# Patient Record
Sex: MALE | Employment: FULL TIME | URBAN - METROPOLITAN AREA
[De-identification: names, ages, dates, MRNs, and addresses within clinical notes are randomized per-mention and may not be internally consistent; named-entity substitution may affect disease eponyms.]

---

## 2017-04-17 DIAGNOSIS — I10 ESSENTIAL HYPERTENSION WITH GOAL BLOOD PRESSURE LESS THAN 140/90: ICD-10-CM

## 2017-04-17 NOTE — TELEPHONE ENCOUNTER
lisinopril (PRINIVIL/ZESTRIL) 20 MG tablet      Last Written Prescription Date: 12/12/16  Last Fill Quantity: 90, # refills: 0  Last Office Visit with G, [facility] or [facility] prescribing provider: 09/22/16       Potassium   Date Value Ref Range Status   07/01/2016 4.0 3.4 - 5.3 mmol/L Final     Creatinine   Date Value Ref Range Status   07/01/2016 1.12 0.66 - 1.25 mg/dL Final     BP Readings from Last 3 Encounters:   09/22/16 110/60   07/11/16 110/74   06/23/15 110/72         Priscilla Gonzales  [facility]

## 2017-04-18 RX ORDER — LISINOPRIL 20 MG/1
TABLET ORAL
Qty: 90 TABLET | Refills: 0 | Status: SHIPPED | OUTPATIENT
Start: 2017-04-18 | End: 2017-05-18

## 2017-04-18 NOTE — TELEPHONE ENCOUNTER
Prescription approved per [facility].  Esme Mcnamara RN

## 2017-05-18 ENCOUNTER — OFFICE VISIT (OUTPATIENT)
Dept: FAMILY MEDICINE | Facility: CLINIC | Age: 57
End: 2017-05-18
Payer: COMMERCIAL

## 2017-05-18 ENCOUNTER — RADIANT APPOINTMENT (OUTPATIENT)
Dept: GENERAL RADIOLOGY | Facility: CLINIC | Age: 57
End: 2017-05-18
Attending: INTERNAL MEDICINE
Payer: COMMERCIAL

## 2017-05-18 VITALS
HEIGHT: 70 IN | SYSTOLIC BLOOD PRESSURE: 128 MMHG | OXYGEN SATURATION: 95 % | DIASTOLIC BLOOD PRESSURE: 82 MMHG | HEART RATE: 96 BPM | BODY MASS INDEX: 27.35 KG/M2 | WEIGHT: 191 LBS | TEMPERATURE: 98.5 F

## 2017-05-18 DIAGNOSIS — J34.89 SINUS PRESSURE: ICD-10-CM

## 2017-05-18 DIAGNOSIS — R09.81 NASAL CONGESTION: ICD-10-CM

## 2017-05-18 DIAGNOSIS — B00.1 COLD SORE: ICD-10-CM

## 2017-05-18 DIAGNOSIS — R68.89 FLU-LIKE SYMPTOMS: ICD-10-CM

## 2017-05-18 DIAGNOSIS — J30.89 SEASONAL ALLERGIC RHINITIS DUE TO OTHER ALLERGIC TRIGGER: Primary | ICD-10-CM

## 2017-05-18 DIAGNOSIS — I10 ESSENTIAL HYPERTENSION WITH GOAL BLOOD PRESSURE LESS THAN 140/90: ICD-10-CM

## 2017-05-18 DIAGNOSIS — H10.13 ALLERGIC CONJUNCTIVITIS, BILATERAL: ICD-10-CM

## 2017-05-18 LAB
BASOPHILS # BLD AUTO: 0 10E9/L (ref 0–0.2)
BASOPHILS NFR BLD AUTO: 0.3 %
DIFFERENTIAL METHOD BLD: NORMAL
EOSINOPHIL # BLD AUTO: 0.2 10E9/L (ref 0–0.7)
EOSINOPHIL NFR BLD AUTO: 3.4 %
ERYTHROCYTE [DISTWIDTH] IN BLOOD BY AUTOMATED COUNT: 13.9 % (ref 10–15)
HCT VFR BLD AUTO: 48.1 % (ref 40–53)
HGB BLD-MCNC: 17.1 G/DL (ref 13.3–17.7)
LYMPHOCYTES # BLD AUTO: 1.6 10E9/L (ref 0.8–5.3)
LYMPHOCYTES NFR BLD AUTO: 24.8 %
MCH RBC QN AUTO: 30.1 PG (ref 26.5–33)
MCHC RBC AUTO-ENTMCNC: 35.6 G/DL (ref 31.5–36.5)
MCV RBC AUTO: 85 FL (ref 78–100)
MONOCYTES # BLD AUTO: 0.8 10E9/L (ref 0–1.3)
MONOCYTES NFR BLD AUTO: 12.3 %
NEUTROPHILS # BLD AUTO: 3.8 10E9/L (ref 1.6–8.3)
NEUTROPHILS NFR BLD AUTO: 59.2 %
PLATELET # BLD AUTO: 176 10E9/L (ref 150–450)
RBC # BLD AUTO: 5.69 10E12/L (ref 4.4–5.9)
WBC # BLD AUTO: 6.4 10E9/L (ref 4–11)

## 2017-05-18 PROCEDURE — 36415 COLL VENOUS BLD VENIPUNCTURE: CPT | Performed by: INTERNAL MEDICINE

## 2017-05-18 PROCEDURE — 85025 COMPLETE CBC W/AUTO DIFF WBC: CPT | Performed by: INTERNAL MEDICINE

## 2017-05-18 PROCEDURE — 86710 INFLUENZA VIRUS ANTIBODY: CPT | Mod: 90 | Performed by: INTERNAL MEDICINE

## 2017-05-18 PROCEDURE — 70220 X-RAY EXAM OF SINUSES: CPT

## 2017-05-18 PROCEDURE — 99214 OFFICE O/P EST MOD 30 MIN: CPT | Performed by: INTERNAL MEDICINE

## 2017-05-18 PROCEDURE — 99000 SPECIMEN HANDLING OFFICE-LAB: CPT | Performed by: INTERNAL MEDICINE

## 2017-05-18 RX ORDER — VALACYCLOVIR HYDROCHLORIDE 1 G/1
2000 TABLET, FILM COATED ORAL 2 TIMES DAILY
Qty: 4 TABLET | Refills: 0 | Status: SHIPPED | OUTPATIENT
Start: 2017-05-18 | End: 2018-03-01

## 2017-05-18 RX ORDER — OLOPATADINE HYDROCHLORIDE 1 MG/ML
1 SOLUTION/ DROPS OPHTHALMIC 2 TIMES DAILY
Qty: 5 ML | Refills: 11 | Status: SHIPPED | OUTPATIENT
Start: 2017-05-18 | End: 2018-03-01

## 2017-05-18 RX ORDER — FLUTICASONE PROPIONATE 50 MCG
2 SPRAY, SUSPENSION (ML) NASAL EVERY MORNING
Qty: 1 BOTTLE | Refills: 11 | Status: SHIPPED | OUTPATIENT
Start: 2017-05-18 | End: 2018-03-01

## 2017-05-18 RX ORDER — MONTELUKAST SODIUM 10 MG/1
10 TABLET ORAL AT BEDTIME
Qty: 30 TABLET | Refills: 11 | Status: SHIPPED | OUTPATIENT
Start: 2017-05-18 | End: 2018-03-01

## 2017-05-18 RX ORDER — LISINOPRIL 20 MG/1
TABLET ORAL
Qty: 90 TABLET | Refills: 0 | Status: CANCELLED | OUTPATIENT
Start: 2017-05-18

## 2017-05-18 RX ORDER — LISINOPRIL 20 MG/1
20 TABLET ORAL DAILY
Qty: 90 TABLET | Refills: 1 | Status: SHIPPED | OUTPATIENT
Start: 2017-05-18 | End: 2018-02-12

## 2017-05-18 RX ORDER — MONTELUKAST SODIUM 10 MG/1
10 TABLET ORAL AT BEDTIME
Qty: 30 TABLET | Refills: 11 | Status: SHIPPED | OUTPATIENT
Start: 2017-05-18 | End: 2017-05-18

## 2017-05-18 NOTE — PATIENT INSTRUCTIONS
This summary includes the important diagnoses, test, medications and other important parts of your medical history.  Below are a few good we sites you can use to learn more about these.     Www.[employer].org : Up to date and easily searchable information on multiple topics.  Www.[employer]ier [employer].org/Pharmacy/c_539084.asp : [facility] $4.99 medications  Www.medlineplus.gov : medication info, interactive tutorials, watch real surgeries online  Www.familydoctor.org : good info from the Academy of Family Physicians  Www.mayoSword.cominic.com : good info from the [facility]  Www.cdc.gov : public health info, travel advisories, epidemics (H1N1)  Www.aap.org : children's health info, normal development, vaccinations  Www.health.[facility].[place].us : [place] dept of heat, public health issues in [place], N1N1    Based on your medical history and these are the current health maintenance or preventive care services that you are due for (some may have been done at this visit:)  Health Maintenance Due   Topic Date Due     EYE EXAM Q1 YEAR( NO INBASKET)  11/26/2015     =================================================================================  Normal Values   Blood pressure  <140/90 for most adults    <130/80 for some chronic diseases (ask your care team about yours)    BMI (body mass index)  18.5-25 kg/m2 (based on height and weight)     Thank you for visiting [facility]    Normal or non-critical lab and imaging results will be communicated to you by MyChart, letter or phone within 7 days.  If you do not hear from us within 10 days, please call the clinic. If you have a critical or abnormal lab result, we will notify you by phone as soon as possible.     If you have any questions regarding your visit please contact:     Team Comfort:   Clinic Hours Telephone Number   Dr. Amandeep García   7am-5pm  Monday - Friday [phone]  Jonas FERNANDEZ   Pharmacy  8am-8pm Monday-Thursday      8am-6pm Friday  9am-5pm Saturday-Sunday [phone]   Urgent Care 11am-8pm Monday-Friday        9am-5pm Saturday-Sunday [phone]     After hours, weekend or if you need to make an appointment with your primary provider please call [phone].   After Hours nurse advise: call [place] [facility]: [phone]    Medication Refills:  Call your pharmacy and they will forward the refill to us. Please allow 3 business days for your refills to be completed.    Use [employer]oc[employer] (secure email communication and access to your chart) to send your primary care provider a message or make an appointment. Ask someone on your Team how to sign up for [employer]. To log on to Boostable or for more information in [place] please visit the website at [email or web address].  As of October 8, 2013, all password changes, disabled accounts, or ID changes in Tocagen/MyHealth will be done by our Access Services Department.   If you need help with your account or password, call: [phone]. Clinic staff no longer has the ability to change passwords.

## 2017-05-18 NOTE — NURSING NOTE
"Chief Complaint   Patient presents with     Flu       Initial /83 (BP Location: Left arm, Patient Position: Chair, Cuff Size: Adult Regular)  Pulse 96  Temp 98.5  F (36.9  C) (Oral)  Ht 5' 10.25\" (1.784 m)  Wt 191 lb (86.6 kg)  SpO2 95%  BMI 27.21 kg/m2 Estimated body mass index is 27.21 kg/(m^2) as calculated from the following:    Height as of this encounter: 5' 10.25\" (1.784 m).    Weight as of this encounter: 191 lb (86.6 kg).  Medication Reconciliation: complete     Pa Aide Calero MA      "

## 2017-05-18 NOTE — PROGRESS NOTES
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  Additional history: as documented    Patient Active Problem List   Diagnosis     CARDIOVASCULAR SCREENING; LDL GOAL LESS THAN 160     Headaches, tension-type     Chronic eczema     Essential hypertension with goal blood pressure less than 140/90     Seasonal allergic rhinitis     Overweight (BMI 25.0-29.9)     Past Surgical History:   Procedure Laterality Date     VASECTOMY         Social History   Substance Use Topics     Smoking status: Never Smoker     Smokeless tobacco: Never Used     Alcohol use 0.6 oz/week     1 Standard drinks or equivalent per week      Comment: ocassional     Family History   Problem Relation Age of Onset     Hypertension Father      Eye Disorder Mother      Eye Disorder Other      mother's side of family has cataracts, and glaucoma?     Asthma No family hx of      C.A.D. No family hx of      DIABETES No family hx of      CEREBROVASCULAR DISEASE No family hx of      Breast Cancer No family hx of      Cancer - colorectal No family hx of      Prostate Cancer No family hx of          No Known Allergies  Recent Labs   Lab Test  07/01/16   0930  06/23/15   0828   05/24/12   1041   LDL  113*  91   --   111   HDL  49  47   --   48   TRIG  77  121   --   127   ALT   --   47   --   "  --    CR  1.12  1.12   < >  1.06   GFRESTIMATED  68  69   < >  74   GFRESTBLACK  83  83   < >  90   POTASSIUM  4.0  3.9   < >  4.3   TSH   --    --    --   2.46    < > = values in this interval not displayed.      BP Readings from Last 3 Encounters:   05/23/17 130/84   05/18/17 128/82   09/22/16 110/60    Wt Readings from Last 3 Encounters:   05/23/17 188 lb 9.6 oz (85.5 kg)   05/18/17 191 lb (86.6 kg)   09/22/16 193 lb 6.4 oz (87.7 kg)                      ROS:  C: NEGATIVE for fever, chills, change in weight  I: NEGATIVE for worrisome rashes, moles or lesions  E: NEGATIVE for vision changes or irritation  ENT/MOUTH: As above.  R: NEGATIVE for significant cough or SOB  CV: NEGATIVE for chest pain, palpitations or peripheral edema  GI: NEGATIVE for nausea, abdominal pain, heartburn, or change in bowel habits  : NEGATIVE for frequency, dysuria, or hematuria  M: NEGATIVE for significant arthralgias or myalgia  N: NEGATIVE for weakness, dizziness or paresthesias  E: NEGATIVE for temperature intolerance, skin/hair changes  H: NEGATIVE for bleeding problems  P: NEGATIVE for changes in mood or affect    OBJECTIVE:                                                    /82  Pulse 96  Temp 98.5  F (36.9  C) (Oral)  Ht 5' 10.25\" (1.784 m)  Wt 191 lb (86.6 kg)  SpO2 95%  BMI 27.21 kg/m2  Body mass index is 27.21 kg/(m^2).  GENERAL: healthy, alert and no distress  EYES: Eyes grossly normal to inspection, extraocular movements - intact, and PERRL  HENT: ear canals- normal; TMs- normal; Nose- normal; Mouth- no ulcers, no lesions  NECK: no tenderness, no adenopathy, no asymmetry, no masses, no stiffness; thyroid- normal to palpation  RESP: lungs clear to auscultation - no rales, no rhonchi, no wheezes  CV: regular rates and rhythm, normal S1 S2, no S3 or S4 and no murmur, no click or rub -  MS: extremities- no gross deformities noted, no edema  SKIN: no suspicious lesions, no rashes  NEURO: strength and tone- normal, " sensory exam- grossly normal, mentation- intact, speech- normal, reflexes- symmetric  PSYCH: Alert and oriented times 3; speech- coherent , normal rate and volume; able to articulate logical thoughts, able to abstract reason, no tangential thoughts, no hallucinations or delusions, affect- normal    Diagnostic test results:  Results for orders placed or performed in visit on 05/18/17   XR Sinus Complete G/E 3 Views    Narrative    XR SINUS COMPLETE G/E 3 VW 5/18/2017 1:35 PM    HISTORY: Sinus disease.    COMPARISON: None.      Impression    IMPRESSION: The bilateral paranasal sinuses are grossly clear.    JONO MERCADO MD   Influenza A Virus Antibody IgM   Result Value Ref Range    Influenza A Ab IgM 0.19    CBC with platelets and differential   Result Value Ref Range    WBC 6.4 4.0 - 11.0 10e9/L    RBC Count 5.69 4.4 - 5.9 10e12/L    Hemoglobin 17.1 13.3 - 17.7 g/dL    Hematocrit 48.1 40.0 - 53.0 %    MCV 85 78 - 100 fl    MCH 30.1 26.5 - 33.0 pg    MCHC 35.6 31.5 - 36.5 g/dL    RDW 13.9 10.0 - 15.0 %    Platelet Count 176 150 - 450 10e9/L    Diff Method Automated Method     % Neutrophils 59.2 %    % Lymphocytes 24.8 %    % Monocytes 12.3 %    % Eosinophils 3.4 %    % Basophils 0.3 %    Absolute Neutrophil 3.8 1.6 - 8.3 10e9/L    Absolute Lymphocytes 1.6 0.8 - 5.3 10e9/L    Absolute Monocytes 0.8 0.0 - 1.3 10e9/L    Absolute Eosinophils 0.2 0.0 - 0.7 10e9/L    Absolute Basophils 0.0 0.0 - 0.2 10e9/L        ASSESSMENT/PLAN:                                                        ICD-10-CM    1. Seasonal allergic rhinitis due to other allergic trigger J30.89 fluticasone (FLONASE) 50 MCG/ACT spray   2. Allergic conjunctivitis, bilateral H10.13 olopatadine (PATANOL) 0.1 % ophthalmic solution     montelukast (SINGULAIR) 10 MG tablet     DISCONTINUED: montelukast (SINGULAIR) 10 MG tablet   3. Essential hypertension with goal blood pressure less than 140/90 I10 lisinopril (PRINIVIL/ZESTRIL) 20 MG tablet   4. Nasal  congestion R09.81    5. Sinus pressure J34.89 XR Sinus Complete G/E 3 Views   6. Flu-like symptoms R68.89 Influenza A Virus Antibody IgM     CBC with platelets and differential   7. Cold sore B00.1 valACYclovir (VALTREX) 1000 mg tablet       Follow-up visit if condition worsens.    Berry Lazo MD  [facility]

## 2017-05-18 NOTE — MR AVS SNAPSHOT
After Visit Summary   5/18/2017    Rudyd Aaron    MRN: [id number]           Patient Information     Date Of Birth          1960        Visit Information        Provider Department      5/18/2017 1:00 PM Berry Lazo MD [facility]        Today's Diagnoses     Seasonal allergic rhinitis due to other allergic trigger    -  1    Allergic conjunctivitis, bilateral        Essential hypertension with goal blood pressure less than 140/90        Nasal congestion        Sinus pressure        Flu-like symptoms        Cold sore          Care Instructions    This summary includes the important diagnoses, test, medications and other important parts of your medical history.  Below are a few good we sites you can use to learn more about these.     Www.[employer] : Up to date and easily searchable information on multiple topics.  Www.[employer]/Pharmacy/c_539084.asp : [employer] [facility] $4.99 medications  Www.[employer].gov : medication info, interactive tutorials, watch real surgeries online  Www.familydoctor.org : good info from the Academy of Family Physicians  Www.[employer] : good info from the [facility]  Www.cdc.gov : public health info, travel advisories, epidemics (H1N1)  Www.aap.org : children's health info, normal development, vaccinations  Www.health.state.[place].us : [place] dept of heatlh, public health issues in [place], N1N1    Based on your medical history and these are the current health maintenance or preventive care services that you are due for (some may have been done at this visit:)  Health Maintenance Due   Topic Date Due     EYE EXAM Q1 YEAR( NO INBASKET)  11/26/2015     =================================================================================  Normal Values   Blood pressure  <140/90 for most adults    <130/80 for some chronic diseases (ask your care team about yours)    BMI (body mass index)  18.5-25 kg/m2 (based on height and weight)     Thank you for  visiting [facility]    Normal or non-critical lab and imaging results will be communicated to you by MyChart, letter or phone within 7 days.  If you do not hear from us within 10 days, please call the clinic. If you have a critical or abnormal lab result, we will notify you by phone as soon as possible.     If you have any questions regarding your visit please contact:     Team Comfort:   Clinic Hours Telephone Number   Dr. Amandeep Quinn   Polina García   7am-5pm  Monday - Friday [phone]  Jonas RN   Pharmacy 8am-8pm Monday-Thursday      8am-6pm Friday  9am-5pm Saturday-Sunday [phone]   Urgent Care 11am-8pm Monday-Friday        9am-5pm Saturday-Sunday [phone]     After hours, weekend or if you need to make an appointment with your primary provider please call [phone].   After Hours nurse advise: call [place] [facility]: [phone]    Medication Refills:  Call your pharmacy and they will forward the refill to us. Please allow 3 business days for your refills to be completed.    Use [employer] (secure email communication and access to your chart) to send your primary care provider a message or make an appointment. Ask someone on your Team how to sign up for [employer]. To log on to Howcast or for more information in [email or web address] please visit the website at [email or web address].  As of October 8, 2013, all password changes, disabled accounts, or ID changes in [employer]/MyHealth will be done by our Access Services Department.   If you need help with your account or password, call: [phone]. Clinic staff no longer has the ability to change passwords.           Follow-ups after your visit        Your next 10 appointments already scheduled     May 23, 2017  9:00 AM CDT   New Visit with Jarred Armenta MD   [facility] ([facility])    [address] [place] [address]  "[phone]              Who to contact     If you have questions or need follow up information about today's clinic visit or your schedule please contact [facility] PARRIS YANCI directly at [phone].  Normal or non-critical lab and imaging results will be communicated to you by Adkuhart, letter or phone within 4 business days after the clinic has received the results. If you do not hear from us within 7 days, please contact the clinic through Adkuhart or phone. If you have a critical or abnormal lab result, we will notify you by phone as soon as possible.  Submit refill requests through [employer] or call your pharmacy and they will forward the refill request to us. Please allow 3 business days for your refill to be completed.          Additional Information About Your Visit        [employer] Information     [employer] gives you secure access to your electronic health record. If you see a primary care provider, you can also send messages to your care team and make appointments. If you have questions, please call your primary care clinic.  If you do not have a primary care provider, please call [phone] and they will assist you.        Care EveryWhere ID     This is your Care EveryWhere ID. This could be used by other organizations to access your [place] medical records  [id number]        Your Vitals Were     Pulse Temperature Height Pulse Oximetry BMI (Body Mass Index)       96 98.5  F (36.9  C) (Oral) 5' 10.25\" (1.784 m) 95% 27.21 kg/m2        Blood Pressure from Last 3 Encounters:   05/18/17 128/82   09/22/16 110/60   07/11/16 110/74    Weight from Last 3 Encounters:   05/18/17 191 lb (86.6 kg)   09/22/16 193 lb 6.4 oz (87.7 kg)   07/11/16 190 lb (86.2 kg)              We Performed the Following     CBC with platelets and differential     Influenza A Virus Antibody IgM     XR Sinus Complete G/E 3 Views          Today's Medication Changes          These changes are accurate as of: 5/18/17  " 1:44 PM.  If you have any questions, ask your nurse or doctor.               Start taking these medicines.        Dose/Directions    fluticasone 50 MCG/ACT spray   Commonly known as:  FLONASE   Used for:  Seasonal allergic rhinitis due to other allergic trigger   Started by:  Berry Lazo MD        Dose:  2 spray   Spray 2 sprays into both nostrils every morning   Quantity:  1 Bottle   Refills:  11       montelukast 10 MG tablet   Commonly known as:  SINGULAIR   Used for:  Allergic conjunctivitis, bilateral   Started by:  Berry Lazo MD        Dose:  10 mg   Take 1 tablet (10 mg) by mouth At Bedtime   Quantity:  30 tablet   Refills:  11       olopatadine 0.1 % ophthalmic solution   Commonly known as:  PATANOL   Used for:  Allergic conjunctivitis, bilateral   Started by:  Berry Lazo MD        Dose:  1 drop   Place 1 drop into both eyes 2 times daily   Quantity:  5 mL   Refills:  11       valACYclovir 1000 mg tablet   Commonly known as:  VALTREX   Used for:  Cold sore   Started by:  Berry Lazo MD        Dose:  2000 mg   Take 2 tablets (2,000 mg) by mouth 2 times daily   Quantity:  4 tablet   Refills:  0         These medicines have changed or have updated prescriptions.        Dose/Directions    lisinopril 20 MG tablet   Commonly known as:  PRINIVIL/ZESTRIL   This may have changed:  See the new instructions.   Used for:  Essential hypertension with goal blood pressure less than 140/90   Changed by:  Berry Lazo MD        Dose:  20 mg   Take 1 tablet (20 mg) by mouth daily   Quantity:  90 tablet   Refills:  1            Where to get your medicines      These medications were sent to [facility] [place], [place] - [address]  [address], [facility] [address]     Phone:  [phone]     fluticasone 50 MCG/ACT spray    montelukast 10 MG tablet    olopatadine 0.1 % ophthalmic solution    valACYclovir 1000 mg tablet         These  medications were sent to [facility] [address] [place] [address] - [address] [facility] [address]  [address] [place] [address]     Phone:  [phone]     lisinopril 20 MG tablet                Primary Care Provider Office Phone # Fax #    Berry Lazo [phone]       [facility] [address]  [facility] [address]        Thank you!     Thank you for choosing [facility]  for your care. Our goal is always to provide you with excellent care. Hearing back from our patients is one way we can continue to improve our services. Please take a few minutes to complete the written survey that you may receive in the mail after your visit with us. Thank you!             Your Updated Medication List - Protect others around you: Learn how to safely use, store and throw away your medicines at www.disposemymeds.org.          This list is accurate as of: 5/18/17  1:44 PM.  Always use your most recent med list.                   Brand Name Dispense Instructions for use    fluticasone 50 MCG/ACT spray    FLONASE    1 Bottle    Spray 2 sprays into both nostrils every morning       ibuprofen 200 MG tablet    ADVIL/MOTRIN     Take 3 tablets by mouth 3 times daily as needed. for neck pain. Take with food/meal.       lisinopril 20 MG tablet    PRINIVIL/ZESTRIL    90 tablet    Take 1 tablet (20 mg) by mouth daily       montelukast 10 MG tablet    SINGULAIR    30 tablet    Take 1 tablet (10 mg) by mouth At Bedtime       olopatadine 0.1 % ophthalmic solution    PATANOL    5 mL    Place 1 drop into both eyes 2 times daily       valACYclovir 1000 mg tablet    VALTREX    4 tablet    Take 2 tablets (2,000 mg) by mouth 2 times daily

## 2017-05-22 DIAGNOSIS — J20.9 ACUTE BRONCHITIS, UNSPECIFIED ORGANISM: ICD-10-CM

## 2017-05-22 LAB — FLUAV IGM SER IA-ACNC: 0.19

## 2017-05-22 RX ORDER — AZITHROMYCIN 250 MG/1
TABLET, FILM COATED ORAL
Qty: 6 TABLET | Refills: 1 | Status: SHIPPED | OUTPATIENT
Start: 2017-05-22 | End: 2018-03-01

## 2017-05-23 ENCOUNTER — RADIANT APPOINTMENT (OUTPATIENT)
Dept: GENERAL RADIOLOGY | Facility: CLINIC | Age: 57
End: 2017-05-23
Attending: INTERNAL MEDICINE
Payer: COMMERCIAL

## 2017-05-23 ENCOUNTER — OFFICE VISIT (OUTPATIENT)
Dept: RHEUMATOLOGY | Facility: CLINIC | Age: 57
End: 2017-05-23
Payer: COMMERCIAL

## 2017-05-23 VITALS
WEIGHT: 188.6 LBS | OXYGEN SATURATION: 100 % | HEART RATE: 86 BPM | DIASTOLIC BLOOD PRESSURE: 84 MMHG | BODY MASS INDEX: 27 KG/M2 | SYSTOLIC BLOOD PRESSURE: 130 MMHG | TEMPERATURE: 97.2 F | HEIGHT: 70 IN

## 2017-05-23 DIAGNOSIS — M54.2 NECK PAIN: ICD-10-CM

## 2017-05-23 DIAGNOSIS — M54.2 NECK PAIN: Primary | ICD-10-CM

## 2017-05-23 PROCEDURE — 99203 OFFICE O/P NEW LOW 30 MIN: CPT | Performed by: INTERNAL MEDICINE

## 2017-05-23 PROCEDURE — 72040 X-RAY EXAM NECK SPINE 2-3 VW: CPT

## 2017-05-23 NOTE — MR AVS SNAPSHOT
After Visit Summary   5/23/2017    Ruddy Aaron    MRN: [id number]           Patient Information     Date Of Birth          1960        Visit Information        Provider Department      5/23/2017 9:00 AM Jarred Armenta MD [facility]        Today's Diagnoses     Neck pain    -  1       Follow-ups after your visit        Additional Services     [facility]Y PT, HAND, AND CHIROPRACTIC REFERRAL       **This order will print in the [facility] Scheduling Office**    Physical Therapy, Hand Therapy and Chiropractic Care are available through:    *[facility]  *[facility]  *[facility]    Call one number to schedule at any of the above locations: [phone].    Your provider has referred you to: Physical Therapy at [facility] or [facility]    Indication/Reason for Referral: Neck Pain  Onset of Illness: at least since 2011  Therapy Orders: Evaluate and Treat  Special Programs: None  Special Request: None    Margret Oswald      Additional Comments for the Therapist or Chiropractor: neck pain with degenerative changes on 2011 x-rays. Repeating X-rays today, 5/23/2017     Please be aware that coverage of these services is subject to the terms and limitations of your health insurance plan.  Call member services at your health plan with any benefit or coverage questions.      Please bring the following to your appointment:    *Your personal calendar for scheduling future appointments  *Comfortable clothing                  Future tests that were ordered for you today     Open Future Orders        Priority Expected Expires Ordered    XR Cervical Spine 2/3 Views Routine 5/23/2017 5/23/2018 5/23/2017            Who to contact     If you have questions or need follow up information about today's clinic visit or your schedule please contact [facility] directly at [phone].  Normal or non-critical lab and imaging results will be communicated to  "you by MyChart, letter or phone within 4 business days after the clinic has received the results. If you do not hear from us within 7 days, please contact the clinic through Shoot Extremet or phone. If you have a critical or abnormal lab result, we will notify you by phone as soon as possible.  Submit refill requests through Eyestorm or call your pharmacy and they will forward the refill request to us. Please allow 3 business days for your refill to be completed.          Additional Information About Your Visit        LeapharDynamighty Information     Eyestorm gives you secure access to your electronic health record. If you see a primary care provider, you can also send messages to your care team and make appointments. If you have questions, please call your primary care clinic.  If you do not have a primary care provider, please call [phone] and they will assist you.        Care EveryWhere ID     This is your Care EveryWhere ID. This could be used by other organizations to access your [facility] medical records  [id number]        Your Vitals Were     Pulse Temperature Height Pulse Oximetry BMI (Body Mass Index)       86 97.2  F (36.2  C) (Oral) 1.784 m (5' 10.25\") 100% 26.87 kg/m2        Blood Pressure from Last 3 Encounters:   05/23/17 130/84   05/18/17 128/82   09/22/16 110/60    Weight from Last 3 Encounters:   05/23/17 85.5 kg (188 lb 9.6 oz)   05/18/17 86.6 kg (191 lb)   09/22/16 87.7 kg (193 lb 6.4 oz)              We Performed the Following     PHILLY PT, HAND, AND CHIROPRACTIC REFERRAL        Primary Care Provider Office Phone # Fax #    Berry Lazo [phone]       [facility] [address]  [facility] [address]        Thank you!     Thank you for choosing [facility]  for your care. Our goal is always to provide you with excellent care. Hearing back from our patients is one way we can continue to improve our services. Please take a few minutes to " complete the written survey that you may receive in the mail after your visit with us. Thank you!             Your Updated Medication List - Protect others around you: Learn how to safely use, store and throw away your medicines at www.disposemymeds.org.          This list is accurate as of: 5/23/17  9:34 AM.  Always use your most recent med list.                   Brand Name Dispense Instructions for use    azithromycin 250 MG tablet    ZITHROMAX    6 tablet    Two tablets first day, then one tablet daily for four days.       fluticasone 50 MCG/ACT spray    FLONASE    1 Bottle    Spray 2 sprays into both nostrils every morning       ibuprofen 200 MG tablet    ADVIL/MOTRIN     Take 3 tablets by mouth 3 times daily as needed. for neck pain. Take with food/meal.       lisinopril 20 MG tablet    PRINIVIL/ZESTRIL    90 tablet    Take 1 tablet (20 mg) by mouth daily       montelukast 10 MG tablet    SINGULAIR    30 tablet    Take 1 tablet (10 mg) by mouth At Bedtime       olopatadine 0.1 % ophthalmic solution    PATANOL    5 mL    Place 1 drop into both eyes 2 times daily       valACYclovir 1000 mg tablet    VALTREX    4 tablet    Take 2 tablets (2,000 mg) by mouth 2 times daily

## 2017-05-23 NOTE — Clinical Note
Dr. Lazo,  I saw Mr. Aaron for neck pain that has been stable since 2011.  His sister had C-spine fusion recently that prompted his evaluation.  I have referred him to PT and am updating x-rays today.  Depending on the x-rays, I will likely also given him a medical spine referral that he can call to schedule if there is no improvement after about 3 months of PT.   Thanks! Jarred

## 2017-05-23 NOTE — NURSING NOTE
"Chief Complaint   Patient presents with     Consult     patient states neck pain, has had a xray done on it       Initial /84 (BP Location: Left arm, Patient Position: Chair, Cuff Size: Adult Regular)  Pulse 86  Temp 97.2  F (36.2  C) (Oral)  Ht 1.784 m (5' 10.25\")  Wt 85.5 kg (188 lb 9.6 oz)  SpO2 100%  BMI 26.87 kg/m2 Estimated body mass index is 26.87 kg/(m^2) as calculated from the following:    Height as of this encounter: 1.784 m (5' 10.25\").    Weight as of this encounter: 85.5 kg (188 lb 9.6 oz).  BP completed using cuff size: regular         RAPID3 (0-30) Cumulative Score  9.0          RAPID3 Weighted Score (divide #4 by 3 and that is the weighted score)  3.0         "

## 2017-05-23 NOTE — PROGRESS NOTES
Rheumatology Clinic Visit      Ruddy Aaron MRN# [id number]   YOB: 1960 Age: 56 year old      Date of visit: 5/23/17   PCP: Dr. Berry Lazo    Chief Complaint   Patient presents with:  Consult: patient states neck pain, has had a xray done on it    Assessment and Plan     1. Neck Pain:  Stable symptoms since 2011 described as a constant non-radiating 8/10 dull ache affecting his neck. No other joint symptoms. His sister's recent cervical spine fusion prompted his appointment today to see what he could do to try and fix his neck pain. We discussed the diagnosis and treatment options. Update cervical spine x-ray today. Physical therapy referral given today. Depending on the C-spine x-ray findings, I will also place a referral for either the pain clinic or for a medical spine evaluation that he may call to make an appointment if PT is ineffective after a 3 month trial, or if the pain prevents the PT activities.    - PT referral  - C-spine x-rays    Mr. Aaron verbalized agreement with and understanding of the rational for the diagnosis and treatment plan.  All questions were answered to best of my ability and the patient's satisfaction. Mr. Aaron was advised to contact the clinic with any questions that may arise after the clinic visit.      Thank you for involving me in the care of the patient    Return to clinic: SINGH MERCER   Ruddy Aaron is a 56 year old male with a past medical history significant for  Tension headaches, eczema, hypertension , and allergic rhinitis who presents for evaluation of neck pain.    Today, Mr. Aaron reports that he has a constant non-radiating 8/10 neck ache.  Massage helps.  Ibuprofen helps.   Pain has been stable and unchanged since about 2011 when he first first diagnosed with degenerative cervical spine changes based on x-rays.   No other joint pain. No morning stiffness.      He says that what prompted him to come in is that his sister just had c-spine fusion  and is having a lot of neck pain; he says that he would like to avoid that.     Denies fevers, chills, nausea, vomiting, constipation, diarrhea. No abdominal pain. No chest pain/pressure, palpitations, or shortness of breath. No LE swelling. No oral or nasal sores.  No rash. No sicca symptoms. No history of inflammatory eye disease.  Currently with sinus congestion and eye itching that he attributes to seasonal allergies.     Tobacco: none  EtOH: Occasional  Drugs: none  Occupation:  at [employer]; used to work at [employer]   GEN: No fevers, chills, night sweats, fatigue, or weight change  SKIN: No itching, rashes, sores  HEENT: No epistaxis. No oral or nasal ulcers. See history of present illness.  CV: No chest pain, pressure, palpitations, or dyspnea on exertion.  PULM: No SOB, wheeze, cough.  Sinus congestion as noted in the history of present illness.   GI: No nausea, vomiting, constipation, diarrhea. No blood in stool. No abdominal pain.  : No blood in urine.  MSK: See HPI.  NEURO: No numbness, tingling, or weakness.  EXT: No LE swelling  PSYCH: Negative    Active Problem List     Patient Active Problem List   Diagnosis     CARDIOVASCULAR SCREENING; LDL GOAL LESS THAN 160     Headaches, tension-type     Chronic eczema     Essential hypertension with goal blood pressure less than 140/90     Seasonal allergic rhinitis     Overweight (BMI 25.0-29.9)     Past Medical History     Past Medical History:   Diagnosis Date     Hypertension goal BP (blood pressure) < 140/90 9/27/2011     Seasonal allergic rhinitis 9/27/2011     Tension type headache, unspecified      Past Surgical History     Past Surgical History:   Procedure Laterality Date     VASECTOMY       Allergy   No Known Allergies  Current Medication List     Current Outpatient Prescriptions   Medication Sig     azithromycin (ZITHROMAX) 250 MG tablet Two tablets first day, then one tablet daily for four days.     lisinopril (PRINIVIL/ZESTRIL) 20  "MG tablet Take 1 tablet (20 mg) by mouth daily     valACYclovir (VALTREX) 1000 mg tablet Take 2 tablets (2,000 mg) by mouth 2 times daily     fluticasone (FLONASE) 50 MCG/ACT spray Spray 2 sprays into both nostrils every morning     olopatadine (PATANOL) 0.1 % ophthalmic solution Place 1 drop into both eyes 2 times daily     montelukast (SINGULAIR) 10 MG tablet Take 1 tablet (10 mg) by mouth At Bedtime     ibuprofen (ADVIL,MOTRIN) 200 MG tablet Take 3 tablets by mouth 3 times daily as needed. for neck pain. Take with food/meal.     No current facility-administered medications for this visit.        Social History   See HPI    Family History     Family History   Problem Relation Age of Onset     Hypertension Father      Eye Disorder Mother      Eye Disorder Other      mother's side of family has cataracts, and glaucoma?     Asthma No family hx of      C.A.D. No family hx of      DIABETES No family hx of      CEREBROVASCULAR DISEASE No family hx of      Breast Cancer No family hx of      Cancer - colorectal No family hx of      Prostate Cancer No family hx of      Denies family history of autoimmune disease.    Sister recently had C-spine fusion.     Physical Exam     Temp Readings from Last 3 Encounters:   05/23/17 97.2  F (36.2  C) (Oral)   05/18/17 98.5  F (36.9  C) (Oral)   09/22/16 97.2  F (36.2  C) (Oral)     BP Readings from Last 5 Encounters:   05/23/17 130/84   05/18/17 128/82   09/22/16 110/60   07/11/16 110/74   06/23/15 110/72     Pulse Readings from Last 1 Encounters:   05/23/17 86     Resp Readings from Last 1 Encounters:   06/23/15 18     Estimated body mass index is 26.87 kg/(m^2) as calculated from the following:    Height as of this encounter: 1.784 m (5' 10.25\").    Weight as of this encounter: 85.5 kg (188 lb 9.6 oz).    GEN: NAD  HEENT: MMM. No oral lesions. EOMI. Anicteric, noninjected sclera  CV: S1, S2. RRR. No m/r/g.  PULM: CTA bilaterally. No w/c.  MSK: Hands, wrists, elbows , and shoulders " without swelling or tenderness to palpation. Knees, ankles, and feet without swelling or tenderness to palpation. Neck nontender to direct palpation. Neck with normal range of motion.   NEURO: UE and LE strengths 5/5 and equal bilaterally.   SKIN: No rash  EXT: No LE edema  PSYCH: Alert. Appropriate.    Labs / Imaging (select studies)     CBC  Recent Labs   Lab Test  05/18/17   1325   WBC  6.4   RBC  5.69   HGB  17.1   HCT  48.1   MCV  85   RDW  13.9   PLT  176   MCH  30.1   MCHC  35.6   NEUTROPHIL  59.2   LYMPH  24.8   MONOCYTE  12.3   EOSINOPHIL  3.4   BASOPHIL  0.3   ANEU  3.8   ALYM  1.6   JAISON  0.8   AEOS  0.2   ABAS  0.0     CMP  Recent Labs   Lab Test  07/01/16   0930  06/23/15   0828  06/12/14   1145   NA  141  138  139   POTASSIUM  4.0  3.9  4.6   CHLORIDE  107  102  100   CO2  25  29  28   ANIONGAP  8  7  11   GLC  93  87  91   BUN  20  20  24   CR  1.12  1.12  1.15   GFRESTIMATED  68  69  67   GFRESTBLACK  83  83  81   CAROLE  8.8  9.5  9.5   BILITOTAL   --   0.9   --    ALBUMIN   --   4.0   --    PROTTOTAL   --   7.4   --    ALKPHOS   --   82   --    AST   --   24   --    ALT   --   47   --      Calcium/VitaminD  Recent Labs   Lab Test  07/01/16 0930  06/23/15   0828  06/12/14   1145   CAROLE  8.8  9.5  9.5     TSH/T4  Recent Labs   Lab Test  05/24/12   1041   TSH  2.46     Lipid Panel  Recent Labs   Lab Test  07/01/16   0930  06/23/15   0828  05/24/12   1041  08/08/11   0815   CHOL  177  162  184  147   TRIG  77  121  127  69   HDL  49  47  48  42   LDL  113*  91  111  91   VLDL   --   24  25  14   CHOLHDLRATIO   --   3.4  3.9  3.5   NHDL  128   --    --    --      Hepatitis C  Recent Labs   Lab Test  07/01/16   0930   HCVAB  Nonreactive   Assay performance characteristics have not been established for newborns,   infants, and children       Urine Protein  Recent Labs   Lab Test  07/01/16   0930  06/12/14   1145  05/24/12   1117   UCRR  212  183  165       CERVICAL SPINE 2-3 VW*  Aug 8, 2011 8:34:00 AM  "  \"FINDINGS: Moderate C4-C5, C5-C6 degenerative disc disease. Mild C6-C7  degenerative disc disease. Alignment is normal. No evidence of acute  fracture or subluxation.\"    Immunization History     Immunization History   Administered Date(s) Administered     Influenza (IIV3) 09/27/2011, 09/26/2012     Influenza Vaccine IM 3yrs+ 4 Valent IIV4 09/22/2016     TDAP Vaccine (Adacel) 08/08/2011          Chart documentation done in part with Dragon Voice recognition Software. Although reviewed after completion, some word and grammatical error may remain.    Jarred Armenta MD    "

## 2017-05-25 DIAGNOSIS — M54.2 CERVICAL SPINE PAIN: Primary | ICD-10-CM

## 2017-05-25 NOTE — PROGRESS NOTES
"Rhapsot message sent:  \"Mr. Aaron,    Your x-rays showed moderate degenerative changes from C4-C7.  For this, generally you improve with physical therapy.  I have also placed a referral for a medical spine specialist evaluation if the pain persists after doing physical therapy.  The phone number to schedule with the medical spine specialist is [phone].    Sincerely,  Jarred Armenta MD  5/25/2017 6:05 PM\""

## 2017-06-01 ENCOUNTER — THERAPY VISIT (OUTPATIENT)
Dept: PHYSICAL THERAPY | Facility: CLINIC | Age: 57
End: 2017-06-01
Payer: COMMERCIAL

## 2017-06-01 DIAGNOSIS — M54.2 NECK PAIN: Primary | ICD-10-CM

## 2017-06-01 PROCEDURE — 97110 THERAPEUTIC EXERCISES: CPT | Mod: GP | Performed by: PHYSICAL THERAPIST

## 2017-06-01 PROCEDURE — 97165 OT EVAL LOW COMPLEX 30 MIN: CPT | Mod: GO | Performed by: PHYSICAL THERAPIST

## 2017-06-01 PROCEDURE — 97112 NEUROMUSCULAR REEDUCATION: CPT | Mod: GP | Performed by: PHYSICAL THERAPIST

## 2017-06-01 NOTE — PROGRESS NOTES
[facility] Initial Evaluation        Subjective:    Patient is a 56 year old male presenting with rehab cervical spine hpi.   Ruddy Aaron is a 56 year old male with a cervical spine condition.  Condition occurred with:  Insidious onset.  Condition occurred: for unknown reasons.  This is a chronic condition  Md: 5/25/2017.    Patient reports pain:  Cervical right side, central cervical spine and cervical left side.    Pain is described as sharp and aching and is intermittent and reported as 9/10.  Associated symptoms:  Ringing in ears and loss of motion/stiffness. Pain is worse during the night.  Symptoms are exacerbated by rotating head and looking up or down (read / computer work) Relieved by: massage.  Since onset symptoms are gradually worsening.  Special tests:  X-ray.  Previous treatment: none.    General health as reported by patient is good.  Pertinent medical history includes:  High blood pressure.  Medical allergies: no.  Other surgeries include:  No.  Current medications:  High blood pressure medication.  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 .  Patient is working in normal job without restrictions.  Primary job tasks include:  Prolonged sitting.    Barriers include:  None as reported by the patient.    Red flags:  None as reported by the patient.                        Objective:    System              Cervical/Thoracic Evaluation      Cervical Myotomes:      C3 (neck side bend): Right: 5  C4 (shrug):  Left: 5    Right: 5  C5 (Deltoid):  Left: 5    Right: 5  C6 (Biceps):  Left: 5    Right: 5  C7 (Triceps):  Left: 5    Right: 5  C8 (Thumb Ext): Left: 5    Right: 5  T1 (Intrinsics): Left: 5    Right: 5                                                          Raz Cervical Evaluation    Posture:  Sitting: poor        Correction of Posture: no effect    Movement Loss:  Protrusion (PRO): nil  Flexion (Flex): pain  Retraction (RET): pain  Extension (EXT): nil  Lateral Flexion  Right (LF R): pain and nil  Lateral Flexion Left (LF L): pain and nil  Rotation Right (ROT R): pain and nil  Rotation Left (ROT L): pain and nil  Test Movements:    PRO: During: no effect    Repeat PRO: During: no effect    RET: During: centralizing    Repeat RET: During: centralizing    RET EXT: During: decreases and centralizing    Repeat RET EXT: During: centralizing                          Conclusion: derangement  Principle of Treatment:  Posture Correction: IN SITTING WITH TOWEL ROLL    Extension: RETRACTION    Other: NEUTRAL SPINE, THER EX,  THER ACT,  NMR, MANUAL THERAPY.                                          ROS    Assessment/Plan:      Patient is a 56 year old male with cervical complaints.    Patient has the following significant findings with corresponding treatment plan.                Diagnosis 1:  NECK PAIN  Pain -  hot/cold therapy, US, electric stimulation, manual therapy, splint/taping/bracing/orthotics, self management, education, directional preference exercise and home program  Decreased function - therapeutic activities and home program  Impaired posture - neuro re-education, therapeutic activities and home program    Therapy Evaluation Codes:   1) History comprised of:   Personal factors that impact the plan of care:      None.    Comorbidity factors that impact the plan of care are:      None.     Medications impacting care: None.  2) Examination of Body Systems comprised of:   Body structures and functions that impact the plan of care:      Cervical spine.   Activity limitations that impact the plan of care are:      Driving, Reading/Computer work and Working.  3) Clinical presentation characteristics are:   Stable/Uncomplicated.  4) Decision-Making    Low complexity using standardized patient assessment instrument and/or measureable assessment of functional outcome.  Cumulative Therapy Evaluation is: Low complexity.    Previous and current functional limitations:  (See Goal Flow Sheet  for this information)    Short term and Long term goals: (See Goal Flow Sheet for this information)     Communication ability:  Patient appears to be able to clearly communicate and understand verbal and written communication and follow directions correctly.  Treatment Explanation - The following has been discussed with the patient:   RX ordered/plan of care  Anticipated outcomes  Possible risks and side effects  This patient would benefit from PT intervention to resume normal activities.   Rehab potential is fair.    Frequency:  1 X week, once daily  Duration:  for 6 weeks  Discharge Plan:  Achieve all LTG.  Independent in home treatment program.  Reach maximal therapeutic benefit.    Please refer to the daily flowsheet for treatment today, total treatment time and time spent performing 1:1 timed codes.

## 2017-06-01 NOTE — MR AVS SNAPSHOT
After Visit Summary   6/1/2017    Ruddy Aaron    MRN: [id number]           Patient Information     Date Of Birth          1960        Visit Information        Provider Department      6/1/2017 7:30 AM Brandan Li PT [facility]        Today's Diagnoses     Neck pain    -  1       Follow-ups after your visit        Who to contact     If you have questions or need follow up information about today's clinic visit or your schedule please contact [facility] directly at [phone].  Normal or non-critical lab and imaging results will be communicated to you by Ensahart, letter or phone within 4 business days after the clinic has received the results. If you do not hear from us within 7 days, please contact the clinic through Ensahart or phone. If you have a critical or abnormal lab result, we will notify you by phone as soon as possible.  Submit refill requests through AssertID or call your pharmacy and they will forward the refill request to us. Please allow 3 business days for your refill to be completed.          Additional Information About Your Visit        MyChart Information     AssertID gives you secure access to your electronic health record. If you see a primary care provider, you can also send messages to your care team and make appointments. If you have questions, please call your primary care clinic.  If you do not have a primary care provider, please call [phone] and they will assist you.        Care EveryWhere ID     This is your Care EveryWhere ID. This could be used by other organizations to access your [facility] medical records  [id number]         Blood Pressure from Last 3 Encounters:   05/23/17 130/84   05/18/17 128/82   09/22/16 110/60    Weight from Last 3 Encounters:   05/23/17 85.5 kg (188 lb 9.6 oz)   05/18/17 86.6 kg (191 lb)   09/22/16 87.7 kg (193 lb 6.4 oz)              We Performed the  Following     PHILLY Inital Eval Report     Neuromuscular Re-Education     OT Eval, Low Complexity (68242)     Therapeutic Exercises        Primary Care Provider Office Phone # Fax #    Berry Lazo [phone]       [facility] [address]  [facility] [address]        Thank you!     Thank you for choosing [facility]  for your care. Our goal is always to provide you with excellent care. Hearing back from our patients is one way we can continue to improve our services. Please take a few minutes to complete the written survey that you may receive in the mail after your visit with us. Thank you!             Your Updated Medication List - Protect others around you: Learn how to safely use, store and throw away your medicines at www.disposemymeds.org.          This list is accurate as of: 6/1/17 11:59 PM.  Always use your most recent med list.                   Brand Name Dispense Instructions for use    azithromycin 250 MG tablet    ZITHROMAX    6 tablet    Two tablets first day, then one tablet daily for four days.       fluticasone 50 MCG/ACT spray    FLONASE    1 Bottle    Spray 2 sprays into both nostrils every morning       ibuprofen 200 MG tablet    ADVIL/MOTRIN     Take 3 tablets by mouth 3 times daily as needed. for neck pain. Take with food/meal.       lisinopril 20 MG tablet    PRINIVIL/ZESTRIL    90 tablet    Take 1 tablet (20 mg) by mouth daily       montelukast 10 MG tablet    SINGULAIR    30 tablet    Take 1 tablet (10 mg) by mouth At Bedtime       olopatadine 0.1 % ophthalmic solution    PATANOL    5 mL    Place 1 drop into both eyes 2 times daily       valACYclovir 1000 mg tablet    VALTREX    4 tablet    Take 2 tablets (2,000 mg) by mouth 2 times daily

## 2017-06-02 PROBLEM — M54.2 NECK PAIN: Status: ACTIVE | Noted: 2017-06-02

## 2017-06-09 ENCOUNTER — MYC MEDICAL ADVICE (OUTPATIENT)
Dept: FAMILY MEDICINE | Facility: CLINIC | Age: 57
End: 2017-06-09

## 2017-06-27 DIAGNOSIS — J01.01 ACUTE RECURRENT MAXILLARY SINUSITIS: Primary | ICD-10-CM

## 2017-07-17 DIAGNOSIS — J32.8 OTHER CHRONIC SINUSITIS: Primary | ICD-10-CM

## 2017-07-17 RX ORDER — METHYLPREDNISOLONE 4 MG
TABLET, DOSE PACK ORAL
Qty: 21 TABLET | Refills: 5 | Status: SHIPPED | OUTPATIENT
Start: 2017-07-17 | End: 2018-03-01

## 2018-01-17 ENCOUNTER — TELEPHONE (OUTPATIENT)
Dept: FAMILY MEDICINE | Facility: CLINIC | Age: 58
End: 2018-01-17

## 2018-01-17 NOTE — LETTER
REPORT OF WORK ABILITY    [address] [address]  [phone]      PATIENT DATA    Employee Name: Ruddy Aaron      : 1960     #: [id number]    Today's date: 2018  Onset of illness: 2018    1. Diagnosis: Flu-like illness    2. Treatment: Rest and conservative treatment..  3. Medication: Analgesics.  4. No work on 2018  5. Return to work date: 2018   ** WITH RESTRICTIONS? No restrictions      Medical Examiner: Berry Lazo MD          License or registration: [place] [id number]    Next appointment: As needed if condition does not improve.    CC: Employer, Managed Care Plan/Payor, Patient

## 2018-01-18 NOTE — TELEPHONE ENCOUNTER
"Noted. Agree with your plan. No reason to start Tamiflu unless diagnosed is confirmed with positive Influenza A/B antigen.  Work ability done. Patient may try to print from \"Letters\" section.  "

## 2018-01-18 NOTE — TELEPHONE ENCOUNTER
Updated patient. He will print note and push fluids and try bland foods in the meantime.     Cira Cox RN, BSN

## 2018-01-18 NOTE — TELEPHONE ENCOUNTER
Influenza-Like Illness (CHARLEY) Protocol    Ruddy Aaron      Age: 57 year old     YOB: 1960    Are you currently sick or have you had close contact with someone who is currently sick?   Yes, this patient is currently sick.     Adult Clinical Evaluation    Is this patient experiencing ANY of the following?  Unconsciousness or unresponsiveness No   Difficulty breathing or swallowing No   Blue or dusky lips, skin, or nail beds No   Chest pain No   Severe confusion or delirium No   Seizure activity: ongoing or stopped No   Severe dehydration or signs of shock No   Patient sounds very sick on the phone No     Is this patient experiencing ANY of the following?  Fever > 104 or shaking chills No   Wheezing with minimal response to usual wheezing medications or new wheezing No   Repeated vomiting or diarrhea with signs of dehydration (no urination within last 12 hours) No   Flu-like symptoms that initially improved but returned with fever and a worse cough No   Stiff or painful neck No   Severe headache No     Does the patient have any of the following?  Measured fever > 100 degrees No   Chills or feels very warm to the touch Yes, feels chilled but no fever   Cough No   Sore throat Yes   Muscle/ body aches No   Headaches No   Fatigue (tiredness) Yes   **Patient reporting nausea, vomiting and diarrhea since yesterday. Patient spouse just got over having stomach flu, symptoms are same as spouse, per patient.      Nursing Plan      Below are conditions which place adults at increased risk for the more severe complications of influenza.    Does this patient have ANY of the following conditions?  Chronic pulmonary disease such as asthma or COPD No   Heart disease (CHF, CAD, anticoag due to arrhythmia) No   Liver disease (hepatitis, liver failure, cirrhosis) No   Kidney disease (renal failure, insufficiency or dialysis) No   Metabolic disorder (e.g. diabetes) No   Neuromuscular disorder (CHARLIE DUNCAN MD, myasthenia  gravis) No   Compromised ability to handle respiratory secretions No   Hematologic disorder (e.g. sickle cell disease) No   HIV / AIDS No   Chemotherapy or radiation within the last 3 months No   Received an organ or a bone marrow transplant No   Taking Prednisone in excess of 20mg daily No   Is age 65 years or older No   Is pregnant, thinks she may be pregnant or is within two weeks after delivery No   Is a resident of a chronic care facility No   Is patient  or [place] native  No     Patient does not fall into high risk category.  Offered Home Care Education.  If no improvement in 3-5 days, patient should be seen by a provider.    If further questions/concerns or if new symptoms develop, call your PCP or [facility] as soon as possible.        Provided home care instructions    General home care instruction:      Avoid contact with people in your household who are at increased risk for more severe complications of influenza (such as pregnant women or people who have a chronic health condition, for example diabetes, heart disease, asthma, or emphysema).    Stay home from work, school, childcare or other public places until your fever (37.8 degrees Celsius [100 degrees Fahrenheit]) has been gone for at least 24 hours, except to seek medical care. (Fever should be gone without the use of fever-reducing medications.) Use a surgical mask if available, or cover your mouth and nose with a tissue if possible if you need to seek medical care. Contact your work place, school, or  as they may have longer exclusion times.    You may continue to shed virus after your fever is gone. Limit your contact with high-risk individuals for 10 days after your symptoms started and be especially careful to cover your coughs/sneezes and wash your hands.    Cover your cough and wash your hands often, and especially after coughing, sneezing, blowing your nose.    Drink plenty of fluids (such as water, broth,  sports drinks, electrolyte beverages for children) to prevent dehydration.    Avoid tobacco and second hand smoke.    Get plenty of rest.    Use over-the-counter pain relievers as needed per  instructions.    Do not give aspirin (acetylsalicylic acid) or products that contain aspirin (e.g. bismuth subsalicylate   Pepto Bismol) to children or teenagers 18 years or younger.    A small number of people with influenza do not have fever. If you have respiratory symptoms and are at increased risk for complications of influenza, contact your health care provider to discuss these symptoms.    For parents of infants:    If possible, only family members who are not sick should care for infants.    Wash your hands with soap and water, or an alcohol-based hand rub (if your hands are not visibly soiled) before caring for your infant.    Cover your mouth and nose with a tissue when coughing or sneezing, and clean your hands.    Contact a health care provider to discuss your illness within 1-2 days if you are    Pregnant    Immunocompromised    Call 911 if you experience:    Difficulty breathing or shortness of breath    Pain or pressure in the chest    Confusion or less responsive than normal    Seizure activity: ongoing or stopped    Severe dehydration or signs of shock     Blue or dusky lips, skin, or nail beds    If further questions/concerns or if new symptoms develop, call your PCP or [facility] as soon as possible.    When to seek medical attention    Contact your health care provider right away if you experience:    A painful sore throat accompanied by fever persists for more than 48 hours    Ear pain, sinus pain, persistent vomiting and/or diarrhea    Oral temperature greater than 104  Fahrenheit (40  Celsius)    Dehydration (e.g., mouth feeling dry, dizzy when sitting/standing, decreased urine output)    Severe or persistent vomiting; unable to keep fluids down    Improvement in flu-like symptoms  (fever and cough or sore throat) but then return of fever and worse cough or sore throat    Not drinking enough fluid  Any other concerns not stated above      Additional educational resources include:    http://www.Revinate.Aumentality.cl    http://www.cdc.gov/flu/    Patient seems to be reporting more stomach flu like symptoms vs respiratory flu. Symptoms started yesterday morning. Pt positive for nausea, diarrhea, vomiting, sore throat and fatigue. Patient is not considered high risk or qualifies for protocol treatment for influenza with tamiflu. Home instructions given to patient. Advised to drink clear liquids-flat soda, gelatin, clear broth. Pt has been drinking Gatorade, writer agreed could continue this. Writer advised can progress to bland diet as tolerated-dry toast, crackers, rice, bananas, applesauce and can progress to more solid foods once stools become formed. Writer advised to avoid dairy products, citrus juices, fried and spicy foods for 2-5 days until symptoms resolved. Writer advised ok to take OTC Imodium or Pepto-Bismol and if develops fever, can treat with IBU or Tylenol. Writer advised for patient to stay home today and rest, follow home care instructions and monitor symptoms. Patient was advised to contact clinic if symptoms worsen or not improving in 3-5 days, and can speak with nurse regarding need to be seen. Patient advised to seek more urgent/emergent care if severe symptoms occur.  Appointment for today with Dr. Shabazz has been canceled.    Routing to PCP to update on patient status. Patient was asking RN for work excuse note for today due to stomach flu like symptoms. Patient wondering if note could be sent to [employer] page. Provider to please advise on this. RN can create and send note if provider prefers and agrees. Thank you.    Oneyda Valentin RN  [facility] Triage

## 2018-01-19 ENCOUNTER — TELEPHONE (OUTPATIENT)
Dept: FAMILY MEDICINE | Facility: CLINIC | Age: 58
End: 2018-01-19

## 2018-01-19 NOTE — TELEPHONE ENCOUNTER
Reason for Call:  Other     Detailed comments: 2nd call back needs letter right away.    Phone Number Patient can be reached at: Cell number on file:    Telephone Information:   Mobile [phone]     Best Time: any    Can we leave a detailed message on this number? YES    Call taken on 1/19/2018 at 12:16 PM by Flaca Watts

## 2018-01-19 NOTE — TELEPHONE ENCOUNTER
Dr. Lazo, please advise on a work note for patient.  Marcelino Alberts,  [employer]

## 2018-01-19 NOTE — TELEPHONE ENCOUNTER
Called and spoke to patient, the letter is completed and will be emailed to patient at [email or web address].  Marcelino Alberts,  [employer] [facility]    Letter is emailed.  Marcelino Alberts,  [employer]

## 2018-01-19 NOTE — TELEPHONE ENCOUNTER
Reason for Call:  Other     Detailed comments: Needs work excuse for today send to pts mychart for work he needs today within next hour please.    Phone Number Patient can be reached at: Cell number on file:    Telephone Information:   Mobile [phone]     Best Time: any    Can we leave a detailed message on this number? YES    Call taken on 1/19/2018 at 8:57 AM by Flaca Watts

## 2018-03-01 ENCOUNTER — OFFICE VISIT (OUTPATIENT)
Dept: FAMILY MEDICINE | Facility: CLINIC | Age: 58
End: 2018-03-01
Payer: COMMERCIAL

## 2018-03-01 VITALS
OXYGEN SATURATION: 96 % | DIASTOLIC BLOOD PRESSURE: 80 MMHG | TEMPERATURE: 98.1 F | HEIGHT: 70 IN | HEART RATE: 76 BPM | BODY MASS INDEX: 27.35 KG/M2 | SYSTOLIC BLOOD PRESSURE: 132 MMHG | WEIGHT: 191 LBS

## 2018-03-01 DIAGNOSIS — J31.0 NONALLERGIC RHINITIS: ICD-10-CM

## 2018-03-01 DIAGNOSIS — I10 HYPERTENSION GOAL BP (BLOOD PRESSURE) < 140/90: Primary | ICD-10-CM

## 2018-03-01 LAB
ALBUMIN SERPL-MCNC: 3.6 G/DL (ref 3.4–5)
ALP SERPL-CCNC: 73 U/L (ref 40–150)
ALT SERPL W P-5'-P-CCNC: 36 U/L (ref 0–70)
ANION GAP SERPL CALCULATED.3IONS-SCNC: 6 MMOL/L (ref 3–14)
AST SERPL W P-5'-P-CCNC: 19 U/L (ref 0–45)
BILIRUB SERPL-MCNC: 0.6 MG/DL (ref 0.2–1.3)
BUN SERPL-MCNC: 21 MG/DL (ref 7–30)
CALCIUM SERPL-MCNC: 8.8 MG/DL (ref 8.5–10.1)
CHLORIDE SERPL-SCNC: 109 MMOL/L (ref 94–109)
CO2 SERPL-SCNC: 28 MMOL/L (ref 20–32)
CREAT SERPL-MCNC: 1.08 MG/DL (ref 0.66–1.25)
CREAT UR-MCNC: 187 MG/DL
GFR SERPL CREATININE-BSD FRML MDRD: 71 ML/MIN/1.7M2
GLUCOSE SERPL-MCNC: 110 MG/DL (ref 70–99)
MICROALBUMIN UR-MCNC: 12 MG/L
MICROALBUMIN/CREAT UR: 6.63 MG/G CR (ref 0–17)
POTASSIUM SERPL-SCNC: 3.8 MMOL/L (ref 3.4–5.3)
PROT SERPL-MCNC: 6.5 G/DL (ref 6.8–8.8)
SODIUM SERPL-SCNC: 143 MMOL/L (ref 133–144)

## 2018-03-01 PROCEDURE — 82043 UR ALBUMIN QUANTITATIVE: CPT | Performed by: INTERNAL MEDICINE

## 2018-03-01 PROCEDURE — 36415 COLL VENOUS BLD VENIPUNCTURE: CPT | Performed by: INTERNAL MEDICINE

## 2018-03-01 PROCEDURE — 99214 OFFICE O/P EST MOD 30 MIN: CPT | Performed by: INTERNAL MEDICINE

## 2018-03-01 PROCEDURE — 80053 COMPREHEN METABOLIC PANEL: CPT | Performed by: INTERNAL MEDICINE

## 2018-03-01 RX ORDER — AZITHROMYCIN 250 MG/1
TABLET, FILM COATED ORAL
Qty: 6 TABLET | Refills: 1 | Status: SHIPPED | OUTPATIENT
Start: 2018-03-01 | End: 2018-06-13

## 2018-03-01 RX ORDER — OLMESARTAN MEDOXOMIL 20 MG/1
20 TABLET ORAL DAILY
Qty: 30 TABLET | Refills: 11 | Status: SHIPPED | OUTPATIENT
Start: 2018-03-01 | End: 2018-03-04

## 2018-03-01 ASSESSMENT — PAIN SCALES - GENERAL: PAINLEVEL: NO PAIN (0)

## 2018-03-01 NOTE — PROGRESS NOTES
SUBJECTIVE:   Ruddy Aaron is a 57 year old male who presents to clinic today for the following health issues:      Hypertension Follow-up      Outpatient blood pressures are being checked at home.  Results are up & down.    Low Salt Diet: no added salt    Amount of exercise or physical activity: None    Problems taking medications regularly: No    Medication side effects: YES, dry cough from Lisinopril.    Diet: regular (no restrictions)        Rhinitis  Duration of complaint: chronic   Description:   Nasal congestion: yes  Sneezing: no   Red, itchy eyes: no   Progression of Symptoms:  intermittent  Accompanying Signs & Symptoms:  Cough: YES  Wheezing: no   Rash: no   Sinus/facial pain: no   History:   Is it seasonal: in the fall and no   History of Asthma: no   Has allergy testing been done: no   Precipitating factors: environmental factors (such as weather changes or mold exposure)  Alleviating factors: None  Therapies Tried and outcome: Montelukast (not effective)    Problem list and histories reviewed & adjusted, as indicated.  Additional history: as documented    Patient Active Problem List   Diagnosis     CARDIOVASCULAR SCREENING; LDL GOAL LESS THAN 160     Headaches, tension-type     Chronic eczema     Essential hypertension with goal blood pressure less than 140/90     Seasonal allergic rhinitis     Overweight (BMI 25.0-29.9)     Neck pain     Nonallergic rhinitis     Past Surgical History:   Procedure Laterality Date     VASECTOMY         Social History   Substance Use Topics     Smoking status: Never Smoker     Smokeless tobacco: Never Used     Alcohol use 0.6 oz/week     1 Standard drinks or equivalent per week      Comment: ocassional     Family History   Problem Relation Age of Onset     Hypertension Father      Eye Disorder Mother      Eye Disorder Other      mother's side of family has cataracts, and glaucoma?     Asthma No family hx of      C.A.D. No family hx of      DIABETES No family hx of   "    CEREBROVASCULAR DISEASE No family hx of      Breast Cancer No family hx of      Cancer - colorectal No family hx of      Prostate Cancer No family hx of          No Known Allergies  Recent Labs   Lab Test  03/01/18   0809  07/01/16   0930  06/23/15   0828   05/24/12   1041   LDL   --   113*  91   --   111   HDL   --   49  47   --   48   TRIG   --   77  121   --   127   ALT  36   --   47   --    --    CR  1.08  1.12  1.12   < >  1.06   GFRESTIMATED  71  68  69   < >  74   GFRESTBLACK  86  83  83   < >  90   POTASSIUM  3.8  4.0  3.9   < >  4.3   TSH   --    --    --    --   2.46    < > = values in this interval not displayed.      BP Readings from Last 3 Encounters:   03/01/18 132/80   05/23/17 130/84   05/18/17 128/82    Wt Readings from Last 3 Encounters:   03/01/18 191 lb (86.6 kg)   05/23/17 188 lb 9.6 oz (85.5 kg)   05/18/17 191 lb (86.6 kg)               ROS:  CONSTITUTIONAL: NEGATIVE for fever, chills, change in weight  INTEGUMENTARY/SKIN: NEGATIVE for worrisome rashes, moles or lesions  EYES: NEGATIVE for vision changes or irritation  ENT/MOUTH: NEGATIVE for ear, mouth and throat problems  RESP:POSITIVE for cough-non productive and NEGATIVE for pleurisy and hemoptysis.  CV: NEGATIVE for chest pain, palpitations or peripheral edema  GI: NEGATIVE for nausea, abdominal pain, heartburn, or change in bowel habits  : NEGATIVE for frequency, dysuria, or hematuria  MUSCULOSKELETAL: NEGATIVE for significant arthralgias or myalgia  NEURO: NEGATIVE for weakness, dizziness or paresthesias  ENDOCRINE: NEGATIVE for temperature intolerance, skin/hair changes  HEME: NEGATIVE for bleeding problems  PSYCHIATRIC: NEGATIVE for changes in mood or affect    OBJECTIVE:     /80  Pulse 76  Temp 98.1  F (36.7  C) (Oral)  Ht 5' 10.25\" (1.784 m)  Wt 191 lb (86.6 kg)  SpO2 96%  BMI 27.21 kg/m2  Body mass index is 27.21 kg/(m^2).  GENERAL: healthy, alert and no distress  EYES: Eyes grossly normal to inspection and " conjunctivae and sclerae normal  HENT: normal cephalic/atraumatic and oral mucous membranes moist  NECK: no adenopathy, no asymmetry, masses, or scars and thyroid normal to palpation  RESP: lungs clear to auscultation - no rales, rhonchi or wheezes  CV: regular rate and rhythm, normal S1 S2, no S3 or S4, no murmur, click or rub, no peripheral edema and peripheral pulses strong  ABDOMEN: soft, nontender, no hepatosplenomegaly, no masses and bowel sounds normal  MS: no gross musculoskeletal defects noted, no edema  SKIN: no suspicious lesions or rashes  NEURO: Normal strength and tone, mentation intact and speech normal  PSYCH: mentation appears normal, affect normal/bright    Diagnostic Test Results:  Results for orders placed or performed in visit on 03/01/18   Albumin Random Urine Quantitative with Creat Ratio   Result Value Ref Range    Creatinine Urine 187 mg/dL    Albumin Urine mg/L 12 mg/L    Albumin Urine mg/g Cr 6.63 0 - 17 mg/g Cr   Comprehensive metabolic panel (BMP + Alb, Alk Phos, ALT, AST, Total. Bili, TP)   Result Value Ref Range    Sodium 143 133 - 144 mmol/L    Potassium 3.8 3.4 - 5.3 mmol/L    Chloride 109 94 - 109 mmol/L    Carbon Dioxide 28 20 - 32 mmol/L    Anion Gap 6 3 - 14 mmol/L    Glucose 110 (H) 70 - 99 mg/dL    Urea Nitrogen 21 7 - 30 mg/dL    Creatinine 1.08 0.66 - 1.25 mg/dL    GFR Estimate 71 >60 mL/min/1.7m2    GFR Estimate If Black 86 >60 mL/min/1.7m2    Calcium 8.8 8.5 - 10.1 mg/dL    Bilirubin Total 0.6 0.2 - 1.3 mg/dL    Albumin 3.6 3.4 - 5.0 g/dL    Protein Total 6.5 (L) 6.8 - 8.8 g/dL    Alkaline Phosphatase 73 40 - 150 U/L    ALT 36 0 - 70 U/L    AST 19 0 - 45 U/L       ASSESSMENT/PLAN:       (I10) Hypertension goal BP (blood pressure) < 140/90  (primary encounter diagnosis)  Comment: Due to ACE INHIBITOR cough, discontinue Lisinopril and start Olmesartan.  Plan: Albumin Random Urine Quantitative with Creat         Ratio, Comprehensive metabolic panel (BMP +         Alb, Alk  Phos, ALT, AST, Total. Bili, TP),         olmesartan (BENICAR) 20 MG tablet,          (J31.0) Nonallergic rhinitis  Comment: Secondary to environmental factors. May still use over-the-counter antihistamines such as Claritin while awake and Zyrtec at bedtime.  Plan: Comprehensive metabolic panel (BMP + Alb, Alk         Phos, ALT, AST, Total. Bili, TP)              FUTURE LABS:       - Fasting labs in 12 months.  FUTURE APPOINTMENTS:       - Follow-up visit in 12 months.    Berry Lazo MD  [facility]

## 2018-03-01 NOTE — MR AVS SNAPSHOT
After Visit Summary   3/1/2018    Ruddy Aaron    MRN: [id number]           Patient Information     Date Of Birth          1960        Visit Information        Provider Department      3/1/2018 7:40 AM Berry Lazo MD [facility]        Today's Diagnoses     Hypertension goal BP (blood pressure) < 140/90    -  1    Nonallergic rhinitis        Acute bronchitis, unspecified organism          Care Instructions    At [facility], we strive to deliver an exceptional experience to you, every time we see you.  If you receive a survey in the mail, please send us back your thoughts. We really do value your feedback.    Based on your medical history, these are the current health maintenance/preventive care services that you are due for (some may have been done at this visit.)  Health Maintenance Due   Topic Date Due     EYE EXAM Q1 YEAR  11/26/2015     ADVANCE DIRECTIVE PLANNING Q5 YRS  09/25/2017         Suggested websites for health information:  Www.Gift Pinpoint : Up to date and easily searchable information on multiple topics.  Www.medlineplus.gov : medication info, interactive tutorials, watch real surgeries online  Www.familydoctor.org : good info from the Academy of Family Physicians  Www.cdc.gov : public health info, travel advisories, epidemics (H1N1)  Www.aap.org : children's health info, normal development, vaccinations  Www.health.state.[place].us : [place] dept of health, public health issues in [place], N1N1    Your care team:                            Family Medicine Internal Medicine   MD Berry Gutiérrez MD Shantel Branch-Fleming, MD Katya Georgiev PA-C Megan Hill, KRISTIN Fallon MD Pediatrics   TUNG Santillan, BRAD Diamond APRN CNP   MD Flori Corey MD Deborah Mielke, MD Kim Thein, APRN [facility]      Clinic hours: Monday - Thursday 7 am-7 pm; Fridays 7 am-5 pm.   Urgent care: Monday -  "Friday 11 am-9 pm; Saturday and Sunday 9 am-5 pm.  Pharmacy : Monday -Thursday 8 am-8 pm; Friday 8 am-6 pm; Saturday and Sunday 9 am-5 pm.     Clinic: [phone]   Pharmacy: [phone]    PATIENT INSTRUCTIONS  1) If your cough persists despite using your new blood pressure medication, it is safe to use over-the-counter plain Zyrtec at bedtime. It is also useful for any seasonal allergies.  2) You can also use plain Claritin every morning.          Follow-ups after your visit        Who to contact     If you have questions or need follow up information about today's clinic visit or your schedule please [facility] at [phone].  Normal or non-critical lab and imaging results will be communicated to you by [employer], letter or phone within 4 business days after the clinic has received the results. If you do not hear from us within 7 days, please contact the clinic through [employer] or phone. If you have a critical or abnormal lab result, we will notify you by phone as soon as possible.  Submit refill requests through [facility] or call your pharmacy and they will forward the refill request to us. Please allow 3 business days for your refill to be completed.          Additional Information About Your Visit        [employer] Information     [facility] gives you secure access to your electronic health record. If you see a primary care provider, you can also send messages to your care team and make appointments. If you have questions, please call your primary care clinic.  If you do not have a primary care provider, please call [phone] and they will assist you.        Care EveryWhere ID     This is your Care EveryWhere ID. This could be used by other organizations to access your [facility] medical records  [id number]        Your Vitals Were     Pulse Temperature Height Pulse Oximetry BMI (Body Mass Index)       76 98.1  F (36.7  C) (Oral) 5' 10.25\" (1.784 m) 96% 27.21 kg/m2        " Blood Pressure from Last 3 Encounters:   03/01/18 132/80   05/23/17 130/84   05/18/17 128/82    Weight from Last 3 Encounters:   03/01/18 191 lb (86.6 kg)   05/23/17 188 lb 9.6 oz (85.5 kg)   05/18/17 191 lb (86.6 kg)              We Performed the Following     Albumin Random Urine Quantitative with Creat Ratio     Comprehensive metabolic panel (BMP + Alb, Alk Phos, ALT, AST, Total. Bili, TP)          Today's Medication Changes          These changes are accurate as of 3/1/18  8:06 AM.  If you have any questions, ask your nurse or doctor.               Start taking these medicines.        Dose/Directions    azithromycin 250 MG tablet   Commonly known as:  ZITHROMAX   Used for:  Acute bronchitis, unspecified organism   Started by:  Berry Lazo MD        Two tablets first day, then one tablet daily for four days.   Quantity:  6 tablet   Refills:  1       olmesartan 20 MG tablet   Commonly known as:  BENICAR   Used for:  Hypertension goal BP (blood pressure) < 140/90   Started by:  Berry Lazo MD        Dose:  20 mg   Take 1 tablet (20 mg) by mouth daily   Quantity:  30 tablet   Refills:  11         Stop taking these medicines if you haven't already. Please contact your care team if you have questions.     lisinopril 20 MG tablet   Commonly known as:  PRINIVIL/ZESTRIL   Stopped by:  Berry Lazo MD                Where to get your medicines      These medications were sent to [facility] [address] [place] [id number]     Phone:  [phone]     olmesartan 20 MG tablet         Some of these will need a paper prescription and others can be bought over the counter.  Ask your nurse if you have questions.     Bring a paper prescription for each of these medications     azithromycin 250 MG tablet                Primary Care Provider Office Phone # Fax #    Berry Lazo [phone]       [address]  [place]  [facility] [address]        Equal Access to Services     ALLIE RODRIGUES : Hadii aad ku hadmaddiedunia Galdamez, waaxda luqadaha, qaybta kaalmanavid johnson, ginger dongjanistrish white. So [facility] [phone].    ATENCIÓN: Si habla español, tiene a abraham disposición servicios gratuitos de asistencia lingüística. Llame al [phone].    We comply with applicable federal civil rights laws and [place] laws. We do not discriminate on the basis of race, color, national origin, age, disability, sex, sexual orientation, or gender identity.            Thank you!     Thank you for choosing [facility]  for your care. Our goal is always to provide you with excellent care. Hearing back from our patients is one way we can continue to improve our services. Please take a few minutes to complete the written survey that you may receive in the mail after your visit with us. Thank you!             Your Updated Medication List - Protect others around you: Learn how to safely use, store and throw away your medicines at www.disposemymeds.org.          This list is accurate as of 3/1/18  8:06 AM.  Always use your most recent med list.                   Brand Name Dispense Instructions for use Diagnosis    ASPIRIN 81 PO      Take 1 tablet by mouth daily        azithromycin 250 MG tablet    ZITHROMAX    6 tablet    Two tablets first day, then one tablet daily for four days.    Acute bronchitis, unspecified organism       olmesartan 20 MG tablet    BENICAR    30 tablet    Take 1 tablet (20 mg) by mouth daily    Hypertension goal BP (blood pressure) < 140/90

## 2018-03-01 NOTE — PATIENT INSTRUCTIONS
At [facility], we strive to deliver an exceptional experience to you, every time we see you.  If you receive a survey in the mail, please send us back your thoughts. We really do value your feedback.    Based on your medical history, these are the current health maintenance/preventive care services that you are due for (some may have been done at this visit.)  Health Maintenance Due   Topic Date Due     EYE EXAM Q1 YEAR  11/26/2015     ADVANCE DIRECTIVE PLANNING Q5 YRS  09/25/2017         Suggested websites for health information:  Www.LightCyber.YepLike! : Up to date and easily searchable information on multiple topics.  Www.[employer].gov : medication info, interactive tutorials, watch real surgeries online  Www.familydoctor.org : good info from the Academy of Family Physicians  Www.cdc.gov : public health info, travel advisories, epidemics (H1N1)  Www.aap.org : children's health info, normal development, vaccinations  Www.health.[facility].[place].us : [place] dept of health, public health issues in [place], N1N1    Your care team:                            Family Medicine Internal Medicine   MD Berry Gutiérrez MD Shantel Branch-Fleming, MD Katya Georgiev PA-C Megan Hill, APRN CNP    Kai Fallon MD Pediatrics   TUNG Santillan, CNP Yamile Diamond APRN CNP   MD Flori Corey MD Deborah Mielke, MD Kim Thein, APRN [facility] [place]      Clinic hours: Monday - Thursday 7 am-7 pm; Fridays 7 am-5 pm.   Urgent care: Monday - Friday 11 am-9 pm; Saturday and Sunday 9 am-5 pm.  Pharmacy : Monday -Thursday 8 am-8 pm; Friday 8 am-6 pm; Saturday and Sunday 9 am-5 pm.     Clinic: [phone]   Pharmacy: [phone]    PATIENT INSTRUCTIONS  1) If your cough persists despite using your new blood pressure medication, it is safe to use over-the-counter plain Zyrtec at bedtime. It is also useful for any seasonal allergies.  2) You can also use plain Claritin every morning.

## 2018-03-02 ENCOUNTER — MYC MEDICAL ADVICE (OUTPATIENT)
Dept: FAMILY MEDICINE | Facility: CLINIC | Age: 58
End: 2018-03-02

## 2018-03-04 PROBLEM — J31.0 NONALLERGIC RHINITIS: Status: ACTIVE | Noted: 2018-03-04

## 2018-03-04 RX ORDER — OLMESARTAN MEDOXOMIL 20 MG/1
20 TABLET ORAL DAILY
Qty: 90 TABLET | Refills: 3 | Status: SHIPPED | OUTPATIENT
Start: 2018-03-04 | End: 2019-03-02

## 2018-06-13 ENCOUNTER — OFFICE VISIT (OUTPATIENT)
Dept: OPHTHALMOLOGY | Facility: CLINIC | Age: 58
End: 2018-06-13
Payer: COMMERCIAL

## 2018-06-13 DIAGNOSIS — H25.813 COMBINED FORMS OF AGE-RELATED CATARACT OF BOTH EYES: Primary | ICD-10-CM

## 2018-06-13 DIAGNOSIS — H52.4 PRESBYOPIA: ICD-10-CM

## 2018-06-13 PROCEDURE — 92015 DETERMINE REFRACTIVE STATE: CPT | Performed by: OPHTHALMOLOGY

## 2018-06-13 PROCEDURE — 92004 COMPRE OPH EXAM NEW PT 1/>: CPT | Performed by: OPHTHALMOLOGY

## 2018-06-13 ASSESSMENT — REFRACTION_WEARINGRX
OS_CYLINDER: SPHERE
OS_SPHERE: +1.75
OD_CYLINDER: SPHERE
SPECS_TYPE: OTC
OD_SPHERE: +1.75

## 2018-06-13 ASSESSMENT — CONF VISUAL FIELD
OS_NORMAL: 1
OD_NORMAL: 1

## 2018-06-13 ASSESSMENT — VISUAL ACUITY
OS_SC: 20/20
OD_CC: 2
OD_SC: 20/20
OS_SC+: -2
OD_SC+: -1
METHOD: SNELLEN - LINEAR
OS_CC: 2

## 2018-06-13 ASSESSMENT — REFRACTION_MANIFEST
OD_ADD: +2.25
OD_CYLINDER: SPHERE
OS_SPHERE: PLANO
OD_SPHERE: PLANO
OS_ADD: +2.25
OS_CYLINDER: SPHERE

## 2018-06-13 ASSESSMENT — TONOMETRY
OD_IOP_MMHG: 15
IOP_METHOD: APPLANATION
OS_IOP_MMHG: 15

## 2018-06-13 ASSESSMENT — EXTERNAL EXAM - LEFT EYE: OS_EXAM: 1+ BROW PTOSIS

## 2018-06-13 ASSESSMENT — CUP TO DISC RATIO
OS_RATIO: 0.3
OD_RATIO: 0.3

## 2018-06-13 ASSESSMENT — SLIT LAMP EXAM - LIDS
COMMENTS: 1+ DERMATOCHALASIS
COMMENTS: 1+ DERMATOCHALASIS

## 2018-06-13 ASSESSMENT — EXTERNAL EXAM - RIGHT EYE: OD_EXAM: 1+ BROW PTOSIS

## 2018-06-13 NOTE — LETTER
6/13/2018         RE: Ruddy Aaron  [address]  [place] [address]        Dear Colleague,    Thank you for referring your patient, Ruddy Aaron, to the [facility]. Please see a copy of my visit note below.     Current Eye Medications:  None.      Subjective:  Comprehensive Eye Exam.  At the end of each day his eyes feel tired.  He has prescription bifocals, but did not bring them (he brought over-the-counter readers today).     Objective:  See Ophthalmology Exam.       Assessment:  New baseline exam in patient with very mild cataract both eyes.      ICD-10-CM    1. Combined forms of age-related cataract, mild, of both eyes H25.813 EYE EXAM (SIMPLE-NONBILLABLE)   2. Presbyopia H52.4 REFRACTIVE STATUS        Plan:  Try over the counter readers with +2.25 power to help with reading.   Possible posterior vitreous detachment (sudden onset large floater and/or flashing lights) discussed.  Use artificial tears up to 4 times daily both eyes.  (Refresh Tears, Systane Ultra/Balance, or Theratears).  Call in February 2019 for an appointment in June 2019 for Complete Exam.    Dr. Stevens [phone]         Again, thank you for allowing me to participate in the care of your patient.        Sincerely,        Reinaldo Stevens MD

## 2018-06-13 NOTE — PROGRESS NOTES
Current Eye Medications:  None.      Subjective:  Comprehensive Eye Exam.  At the end of each day his eyes feel tired.  He has prescription bifocals, but did not bring them (he brought over-the-counter readers today).     Objective:  See Ophthalmology Exam.       Assessment:  New baseline exam in patient with very mild cataract both eyes.      ICD-10-CM    1. Combined forms of age-related cataract, mild, of both eyes H25.813 EYE EXAM (SIMPLE-NONBILLABLE)   2. Presbyopia H52.4 REFRACTIVE STATUS        Plan:  Try over the counter readers with +2.25 power to help with reading.   Possible posterior vitreous detachment (sudden onset large floater and/or flashing lights) discussed.  Use artificial tears up to 4 times daily both eyes.  (Refresh Tears, Systane Ultra/Balance, or Theratears).  Call in February 2019 for an appointment in June 2019 for Complete Exam.    Dr. Stevens [phone]

## 2018-06-13 NOTE — MR AVS SNAPSHOT
After Visit Summary   6/13/2018    Ruddy Aaron    MRN: [id number]           Patient Information     Date Of Birth          1960        Visit Information        Provider Department      6/13/2018 8:00 AM Reinaldo Stevens MD [facility]        Today's Diagnoses     Presbyopia    -  1      Care Instructions    Try over the counter readers with +2.25 power to help with reading.   Possible posterior vitreous detachment (sudden onset large floater and/or flashing lights) discussed.  Use artificial tears up to 4 times daily both eyes.  (Refresh Tears, Systane Ultra/Balance, or Theratears).  Call in February 2019 for an appointment in June 2019 for Complete Exam.    Dr. Stevens [phone]          Follow-ups after your visit        Who to contact     If you have questions or need follow up information about today's clinic visit or your schedule please contact [facility] directly at [phone].  Normal or non-critical lab and imaging results will be communicated to you by Prometheanhart, letter or phone within 4 business days after the clinic has received the results. If you do not hear from us within 7 days, please contact the clinic through relocalityt or phone. If you have a critical or abnormal lab result, we will notify you by phone as soon as possible.  Submit refill requests through Quolaw or call your pharmacy and they will forward the refill request to us. Please allow 3 business days for your refill to be completed.          Additional Information About Your Visit        MyChart Information     Quolaw gives you secure access to your electronic health record. If you see a primary care provider, you can also send messages to your care team and make appointments. If you have questions, please call your primary care clinic.  If you do not have a primary care provider, please call [phone] and they will assist you.        Care EveryWhere ID     This is your Care  EveryWhere ID. This could be used by other organizations to access your [place] medical records  [id number]         Blood Pressure from Last 3 Encounters:   03/01/18 132/80   05/23/17 130/84   05/18/17 128/82    Weight from Last 3 Encounters:   03/01/18 86.6 kg (191 lb)   05/23/17 85.5 kg (188 lb 9.6 oz)   05/18/17 86.6 kg (191 lb)              Today, you had the following     No orders found for display       Primary Care Provider Office Phone # Fax #    Berry Lazo [phone]       [address]  [facility] [address]        Equal Access to Services     JEANETTE [facility] Hospital[facility] : Hadii arnel biswaso Soangely, waaxda luqadaha, qaybta kaalmada adeegyada, ginger morris . So [facility] [phone].    ATENCIÓN: Si habla español, tiene a abraham disposición servicios gratuitos de asistencia lingüística. Llame al [phone].    We comply with applicable federal civil rights laws and [place] laws. We do not discriminate on the basis of race, color, national origin, age, disability, sex, sexual orientation, or gender identity.            Thank you!     Thank you for choosing [facility]  for your care. Our goal is always to provide you with excellent care. Hearing back from our patients is one way we can continue to improve our services. Please take a few minutes to complete the written survey that you may receive in the mail after your visit with us. Thank you!             Your Updated Medication List - Protect others around you: Learn how to safely use, store and throw away your medicines at www.disposemymeds.org.          This list is accurate as of 6/13/18  9:03 AM.  Always use your most recent med list.                   Brand Name Dispense Instructions for use Diagnosis    ASPIRIN 81 PO      Take 1 tablet by mouth daily        olmesartan 20 MG tablet    BENICAR    90 tablet    Take 1 tablet (20 mg) by mouth daily    Hypertension goal BP (blood pressure) <  140/90

## 2018-06-16 PROBLEM — H25.813 COMBINED FORMS OF AGE-RELATED CATARACT OF BOTH EYES: Status: ACTIVE | Noted: 2018-06-16

## 2018-11-07 ENCOUNTER — MYC MEDICAL ADVICE (OUTPATIENT)
Dept: FAMILY MEDICINE | Facility: CLINIC | Age: 58
End: 2018-11-07

## 2018-11-07 DIAGNOSIS — M50.30 DDD (DEGENERATIVE DISC DISEASE), CERVICAL: Primary | ICD-10-CM

## 2018-11-26 ENCOUNTER — OFFICE VISIT (OUTPATIENT)
Dept: ORTHOPEDICS | Facility: CLINIC | Age: 58
End: 2018-11-26
Payer: COMMERCIAL

## 2018-11-26 VITALS
SYSTOLIC BLOOD PRESSURE: 140 MMHG | DIASTOLIC BLOOD PRESSURE: 87 MMHG | OXYGEN SATURATION: 96 % | HEART RATE: 81 BPM | BODY MASS INDEX: 27.6 KG/M2 | HEIGHT: 70 IN | WEIGHT: 192.8 LBS

## 2018-11-26 DIAGNOSIS — M54.2 CERVICALGIA: Primary | ICD-10-CM

## 2018-11-26 PROCEDURE — 99214 OFFICE O/P EST MOD 30 MIN: CPT | Performed by: FAMILY MEDICINE

## 2018-11-26 RX ORDER — CYCLOBENZAPRINE HCL 10 MG
5 TABLET ORAL
Qty: 30 TABLET | Refills: 1 | Status: SHIPPED | OUTPATIENT
Start: 2018-11-26 | End: 2020-08-26

## 2018-11-26 ASSESSMENT — PAIN SCALES - GENERAL: PAINLEVEL: SEVERE PAIN (6)

## 2018-11-26 NOTE — PROGRESS NOTES
CHIEF COMPLAINT:  No chief complaint on file.       HISTORY OF PRESENT ILLNESS  Mr. Aaron is a pleasant 58 year old year old male who presents to clinic today with neck pain.  Ruddy is seen at the request of Dr. Lazo.    Ruddy has had neck pain for a few years.  No inciting event that he can recall.  Pain is constant, aching, but is intermittently worse.  Sometimes this gets better with massage, sometimes certain exercises that he does on his own.  He has tried NSAIDs and relative rest in the past, these helped.  He denies any symptoms down his shoulders or arms, he has no weakness in his extremities.    Additional history: as documented    MEDICAL HISTORY  Patient Active Problem List   Diagnosis     CARDIOVASCULAR SCREENING; LDL GOAL LESS THAN 160     Headaches, tension-type     Chronic eczema     Essential hypertension with goal blood pressure less than 140/90     Seasonal allergic rhinitis     Overweight (BMI 25.0-29.9)     Neck pain     Nonallergic rhinitis     Combined forms of age-related cataract, mild, of both eyes       Current Outpatient Prescriptions   Medication Sig Dispense Refill     ASPIRIN 81 PO Take 1 tablet by mouth daily       olmesartan (BENICAR) 20 MG tablet Take 1 tablet (20 mg) by mouth daily 90 tablet 3       No Known Allergies    Family History   Problem Relation Age of Onset     Hypertension Father      Eye Disorder Mother      Eye Disorder Other      mother's side of family has cataracts, and glaucoma?     Asthma No family hx of      C.A.D. No family hx of      Diabetes No family hx of      Cerebrovascular Disease No family hx of      Breast Cancer No family hx of      Cancer - colorectal No family hx of      Prostate Cancer No family hx of        Additional medical/Social/Surgical histories reviewed in [facility] and updated as appropriate.     REVIEW OF SYSTEMS (11/26/2018)  CONSTITUTIONAL: Denies fever and weight loss  EYES: Denies acute vision changes  ENT: Denies hearing changes or  "difficulty swallowing  CARDIAC: Denies chest pain or edema  RESPIRATORY: Denies dyspnea, cough or wheeze  GASTROINTESTINAL: Denies abdominal pain, nausea, vomiting  MUSCULOSKELETAL: See HPI  SKIN: Denies any recent rash or lesion  NEUROLOGICAL: Denies numbness or focal weakness  PSYCHIATRIC: No history of psychiatric symptoms or problems  ENDOCRINE: Denies current diagnosis of diabetes  HEMATOLOGY: Denies episodes of easy bleeding      PHYSICAL EXAM  Vitals:    11/26/18 1122   BP: 140/87   Pulse: 81   SpO2: 96%   Weight: 87.5 kg (192 lb 12.8 oz)   Height: 1.784 m (5' 10.25\")     General  - normal appearance, in no obvious distress  CV  - normal peripheral perfusion  Pulm  - normal respiratory pattern, non-labored  Musculoskeletal - cervical spine  - inspection: normal bone and joint alignment, no obvious kyphosis  - palpation: no paravertebral or bony tenderness  - ROM: pain with rotation and sidebending  - strength: upper extremities 5/5 in all planes  Neuro  - C5-7 DTRs 2+ bilaterally, no sensory or motor deficit, grossly normal coordination, normal muscle tone  Skin  - no ecchymosis, erythema, warmth, or induration, no obvious rash  Psych  - interactive, appropriate, normal mood and affect             ASSESSMENT & PLAN  Mr. Aaron is a 58 year old year old male who presents to clinic today with neck pain.    I reviewed his prior x-ray in the room with him which reads:  IMPRESSION: No evidence of acute fracture or malalignment. Moderate  degenerative change from C4-C7. The prevertebral soft tissues are unremarkable.    We discussed degenerative disc disease in detail.  We also discussed some management strategies, including exercise and avoidance of exacerbating activities.    I am referring him to physical therapy.  I am also prescribing a muscle relaxer for nighttime use, as needed.    We should follow-up in the future if worsening or concerned, or if he develops radicular symptoms.    Thank you for allowing me " to participate in Dev's care.    Andrzej Benavidez DO, CAQSM  Primary Care Sports Medicine

## 2018-11-26 NOTE — LETTER
11/26/2018         RE: Ruddy Aaron  [address]  [place] [address]        Dear Colleague,    Thank you for referring your patient, Ruddy Aaron, to the [facility]. Please see a copy of my visit note below.    CHIEF COMPLAINT:  No chief complaint on file.       HISTORY OF PRESENT ILLNESS  Mr. Aaron is a pleasant 58 year old year old male who presents to clinic today with neck pain.  Ruddy is seen at the request of Dr. Lazo.    Ruddy has had neck pain for a few years.  No inciting event that he can recall.  Pain is constant, aching, but is intermittently worse.  Sometimes this gets better with massage, sometimes certain exercises that he does on his own.  He has tried NSAIDs and relative rest in the past, these helped.  He denies any symptoms down his shoulders or arms, he has no weakness in his extremities.    Additional history: as documented    MEDICAL HISTORY  Patient Active Problem List   Diagnosis     CARDIOVASCULAR SCREENING; LDL GOAL LESS THAN 160     Headaches, tension-type     Chronic eczema     Essential hypertension with goal blood pressure less than 140/90     Seasonal allergic rhinitis     Overweight (BMI 25.0-29.9)     Neck pain     Nonallergic rhinitis     Combined forms of age-related cataract, mild, of both eyes       Current Outpatient Prescriptions   Medication Sig Dispense Refill     ASPIRIN 81 PO Take 1 tablet by mouth daily       olmesartan (BENICAR) 20 MG tablet Take 1 tablet (20 mg) by mouth daily 90 tablet 3       No Known Allergies    Family History   Problem Relation Age of Onset     Hypertension Father      Eye Disorder Mother      Eye Disorder Other      mother's side of family has cataracts, and glaucoma?     Asthma No family hx of      C.A.D. No family hx of      Diabetes No family hx of      Cerebrovascular Disease No family hx of      Breast Cancer No family hx of      Cancer - colorectal No family hx of      Prostate Cancer No family hx of   "      Additional medical/Social/Surgical histories reviewed in [facility] and updated as appropriate.     REVIEW OF SYSTEMS (11/26/2018)  CONSTITUTIONAL: Denies fever and weight loss  EYES: Denies acute vision changes  ENT: Denies hearing changes or difficulty swallowing  CARDIAC: Denies chest pain or edema  RESPIRATORY: Denies dyspnea, cough or wheeze  GASTROINTESTINAL: Denies abdominal pain, nausea, vomiting  MUSCULOSKELETAL: See HPI  SKIN: Denies any recent rash or lesion  NEUROLOGICAL: Denies numbness or focal weakness  PSYCHIATRIC: No history of psychiatric symptoms or problems  ENDOCRINE: Denies current diagnosis of diabetes  HEMATOLOGY: Denies episodes of easy bleeding      PHYSICAL EXAM  Vitals:    11/26/18 1122   BP: 140/87   Pulse: 81   SpO2: 96%   Weight: 87.5 kg (192 lb 12.8 oz)   Height: 1.784 m (5' 10.25\")     General  - normal appearance, in no obvious distress  CV  - normal peripheral perfusion  Pulm  - normal respiratory pattern, non-labored  Musculoskeletal - cervical spine  - inspection: normal bone and joint alignment, no obvious kyphosis  - palpation: no paravertebral or bony tenderness  - ROM: pain with rotation and sidebending  - strength: upper extremities 5/5 in all planes  Neuro  - C5-7 DTRs 2+ bilaterally, no sensory or motor deficit, grossly normal coordination, normal muscle tone  Skin  - no ecchymosis, erythema, warmth, or induration, no obvious rash  Psych  - interactive, appropriate, normal mood and affect             ASSESSMENT & PLAN  Mr. Aaron is a 58 year old year old male who presents to clinic today with neck pain.    I reviewed his prior x-ray in the room with him which reads:  IMPRESSION: No evidence of acute fracture or malalignment. Moderate  degenerative change from C4-C7. The prevertebral soft tissues are unremarkable.    We discussed degenerative disc disease in detail.  We also discussed some management strategies, including exercise and avoidance of exacerbating " activities.    I am referring him to physical therapy.  I am also prescribing a muscle relaxer for nighttime use, as needed.    We should follow-up in the future if worsening or concerned, or if he develops radicular symptoms.    Thank you for allowing me to participate in Dev's care.    Andrzej Benavidez DO, [facility]  Primary Care Sports Medicine           Again, thank you for allowing me to participate in the care of your patient.        Sincerely,        Andrzej Benavidez DO

## 2018-11-26 NOTE — MR AVS SNAPSHOT
After Visit Summary   2018    Ruddy Aaron    MRN: [id number]           Patient Information     Date Of Birth          1960        Visit Information        Provider Department      2018 11:20 AM Andrzej Benavidez,  [facility]        Today's Diagnoses     Cervicalgia    -  1      Care Instructions    Thanks for coming today.  Ortho/Sports Medicine Clinic  [address] [place] [address]    To schedule future appointments in Ortho Clinic, you may call [phone].    To schedule ordered imaging by your provider:   Call Central Imaging [phone]    To schedule an injection ordered by your provider:  Call Central Imaging Injection scheduling line: [phone]  MyChart available online at:  [email or web address]    Please call if any further questions or concerns ([phone]).  Clinic hours 8 am to 5 pm.    Return to clinic (call) if symptoms worsen or fail to improve.            Follow-ups after your visit        Additional Services     PHYSICAL THERAPY REFERRAL (Internal)       Physical Therapy Referral                  Your next 10 appointments already scheduled     Dec 03, 2018  9:00 AM CST   (Arrive by 8:45 AM)   [facility] Spine with Annabel Herzog, PT   [facility] ([facility]  )    [address] [facility] [address] [phone]              Future tests that were ordered for you today     Open Future Orders        Priority Expected Expires Ordered    PHYSICAL THERAPY REFERRAL (Internal) Routine  2019            Who to contact     If you have questions or need follow up information about today's clinic visit or your schedule please contact [facility] directly at [phone].  Normal or non-critical lab and imaging results will be communicated to you by MyChart, letter or phone within 4 business days after the clinic has received the results. If  "you do not hear from us within 7 days, please contact the clinic through Meridian-IQ or phone. If you have a critical or abnormal lab result, we will notify you by phone as soon as possible.  Submit refill requests through Meridian-IQ or call your pharmacy and they will forward the refill request to us. Please allow 3 business days for your refill to be completed.          Additional Information About Your Visit        ConnectedHealthharTraansmission Information     Meridian-IQ gives you secure access to your electronic health record. If you see a primary care provider, you can also send messages to your care team and make appointments. If you have questions, please call your primary care clinic.  If you do not have a primary care provider, please call [phone] and they will assist you.      Meridian-IQ is an electronic gateway that provides easy, online access to your medical records. With Meridian-IQ, you can request a clinic appointment, read your test results, renew a prescription or communicate with your care team.     To access your existing account, please contact your [facility] or call [phone] for assistance.        Care EveryWhere ID     This is your Care EveryWhere ID. This could be used by other organizations to access your [place] medical records  [id number]        Your Vitals Were     Pulse Height Pulse Oximetry BMI (Body Mass Index)          81 1.784 m (5' 10.25\") 96% 27.47 kg/m2         Blood Pressure from Last 3 Encounters:   11/26/18 140/87   03/01/18 132/80   05/23/17 130/84    Weight from Last 3 Encounters:   11/26/18 87.5 kg (192 lb 12.8 oz)   03/01/18 86.6 kg (191 lb)   05/23/17 85.5 kg (188 lb 9.6 oz)                 Today's Medication Changes          These changes are accurate as of 11/26/18 12:03 PM.  If you have any questions, ask your nurse or doctor.               Start taking these medicines.        Dose/Directions    cyclobenzaprine 10 MG tablet   Commonly known as:  FLEXERIL   Used " for:  Cervicalgia   Started by:  Andrzej Benavidez DO        Dose:  5 mg   Take 0.5 tablets (5 mg) by mouth nightly as needed for muscle spasms   Quantity:  30 tablet   Refills:  1            Where to get your medicines      These medications were sent to [facility] [place], [place] - [address]  [address] [address]     Phone:  [phone]     cyclobenzaprine 10 MG tablet                Primary Care Provider Office Phone # Fax #    Berry Lazo [phone]       [address]  [facility] [address]        Equal Access to Services     [facility]: Hadii aad ku hadasho Soomaali, waaxda luqadaha, qaybta kaalmada adeegyada, waxninoska morris . So [facility] [phone].    ATENCIÓN: Si habla español, tiene a abraham disposición servicios gratuitos de asistencia lingüística. [facility] [phone].    We comply with applicable federal civil rights laws and [place] laws. We do not discriminate on the basis of race, color, national origin, age, disability, sex, sexual orientation, or gender identity.            Thank you!     Thank you for choosing [facility]  for your care. Our goal is always to provide you with excellent care. Hearing back from our patients is one way we can continue to improve our services. Please take a few minutes to complete the written survey that you may receive in the mail after your visit with us. Thank you!             Your Updated Medication List - Protect others around you: Learn how to safely use, store and throw away your medicines at www.disposemymeds.org.          This list is accurate as of 11/26/18 12:03 PM.  Always use your most recent med list.                   Brand Name Dispense Instructions for use Diagnosis    ASPIRIN 81 PO      Take 1 tablet by mouth daily        cyclobenzaprine 10 MG tablet    FLEXERIL    30 tablet    Take 0.5 tablets (5 mg) by mouth nightly as needed  for muscle spasms    Cervicalgia       olmesartan 20 MG tablet    BENICAR    90 tablet    Take 1 tablet (20 mg) by mouth daily    Hypertension goal BP (blood pressure) < 140/90

## 2018-11-26 NOTE — NURSING NOTE
"Ruddy Aaron's chief complaint for this visit includes:  Chief Complaint   Patient presents with     Consult     neck pain X 2 years.      PCP: Berry Lazo    Referring Provider:  Andrzej Benavidez DO  [address] [place] [address]    /87  Pulse 81  Ht 1.784 m (5' 10.25\")  Wt 87.5 kg (192 lb 12.8 oz)  SpO2 96%  BMI 27.47 kg/m2  Severe Pain (6)     Do you need any medication refills at today's visit? No        "

## 2018-11-26 NOTE — PATIENT INSTRUCTIONS
Thanks for coming today.  [facility]  [address] [place] [address]    To schedule future appointments in Ortho Clinic, you may call [phone].    To schedule ordered imaging by your provider:   Call Central [facility] [phone]    To schedule an injection ordered by your provider:  Call [facility] Injection scheduling line: [phone]  [employer] available online at:  [email or web address]    Please call if any further questions or concerns ([phone]).  Clinic hours 8 am to 5 pm.    Return to clinic (call) if symptoms worsen or fail to improve.

## 2018-12-03 ENCOUNTER — THERAPY VISIT (OUTPATIENT)
Dept: PHYSICAL THERAPY | Facility: CLINIC | Age: 58
End: 2018-12-03
Payer: COMMERCIAL

## 2018-12-03 DIAGNOSIS — M54.2 CERVICALGIA: ICD-10-CM

## 2018-12-03 PROCEDURE — 97140 MANUAL THERAPY 1/> REGIONS: CPT | Mod: GP | Performed by: PHYSICAL THERAPIST

## 2018-12-03 PROCEDURE — 97110 THERAPEUTIC EXERCISES: CPT | Mod: GP | Performed by: PHYSICAL THERAPIST

## 2018-12-03 PROCEDURE — 97161 PT EVAL LOW COMPLEX 20 MIN: CPT | Mod: GP | Performed by: PHYSICAL THERAPIST

## 2018-12-03 NOTE — PROGRESS NOTES
[facility] Initial Evaluation -- Cervical    Evaluation Date: December 3, 2018  Ruddy Aaron is a 58 year old male with a cervical condition.   Referral: 11/26/2018  Work mechanical stresses: Sitting, standing, computer work  Employment status: Manager  Leisure mechanical stresses: Yardwork  Functional disability score (NDI):  12  VAS score (0-10): 6/10  Patient goals/expectations:  Decrease neck pain    HISTORY:    Present symptoms:  B neck pain.  Pain quality (sharp/shooting/stabbing/aching/burning/cramping):   Aching, burning.  Paresthesia (yes/no):  no    Present since (onset date): Reports onset of neck pain about 2 years ago.  Received PT orders 11/26/2018     Symptoms (improving/unchanging/worsening):  unchaning.    Symptoms commenced as a result of: no apparent reason   Condition occurred in the following environment:  home    Symptoms at onset (neck/arm/forearm/headache): B neck pain  Constant symptoms (neck/arm/forearm/headache): B neck neck  Intermittent symptoms (neck/arm/forearm/headache): sometime radiates into B shoulders    Symptoms are made worse with the following: Always Sitting at computer, Always Turning and Looking down   Symptoms are made better with the following: Nothing    Disturbed sleep (yes/no): Hard to get to sleep  Number of pillows: 1  Sleeping postures (prone/sup/side R/L): all    Previous episodes (0/1-5/6-10/11+): 0 Year of first episode: 2016    Previous history: No previous episodes of neck pain  Previous treatments: None    Specific Questions: (as reported by the patient)  Dizziness/Tinnitus/Nausea/Swallowing (pos/neg): no  Gait/Upper Limbs (normal/abnormal): normal  Medications (nil/NSAIDS/anlag/steroids/anticoag/other):  Other - High blood pressure  Medical allergies:  no  General health (excellent/good/fair/poor):  excellent  Pertinent medical history:  High blood pressure  Imaging (None/Xray/MRI/Other):  X-ray-moderate degenerative changes  Recent or  major surgery (yes/no): No  Night pain (yes/no): no  Accidents (yes/no): no  Unexplained weight loss (yes/no): no  Barriers at home: no  Other red flags: no    EXAMINATION    Posture:   Sitting (good/fair/poor): poor Standing (good/fair/poor): fair     Protruded head (yes/no): yes    Wry Neck (right/left/nil):     Relevant (yes/no):       Correction of posture(better/worse/no effect): no effect  Other observations:      Neurological:    Motor Deficit:  n/t   Reflexes:  n/t  Sensory Deficit:  n/t   Dural signs:  n/t    Movement Loss:   Naun Mod Min Nil Pain   Protrusion    x NE   Flexion    x NE   Retraction  x   Inc neck pain   Extension   x  NE   Lateral flexion R    x Tight   Lateral flexion L    x Tight   Rotation R   x  Inc pain   Rotation L   x  Inc pain     Test Movements:   During: produces, abolishes, increases, decreases, no effect, centralizing, peripheralizing  After: better, worse, no better, no worse, no effect, centralized, peripheralized    Pretest symptoms sittin/10 B neck pain   Symptoms During Symptoms After ROM increased ROM decreased No Effect   PRO No Effect    No Effect         Rep PRO          RET Increases  No Worse         Rep RET Increases    No Worse    slight     RET EXT Pain during motion returning to neutral  No Worse         Rep RET EXT Pain during motion returning to neutral    No Worse x     Pretest symptoms lying:     Symptoms During Symptoms After ROM increased ROM decreased No Effect   RET          Rep RET          RET EXT          Rep RET EXT          If required, pretest symptoms sitting:      Symptoms During Symptoms After ROM increased ROM decreased No Effect   LF-R          Rep LF-R          LF-L          Rep LF-L          ROT-R          Rep ROT-R          ROT-L          Rep ROT-L          FLEX          Rep FLEX              Static Tests:   Protrusion:     Flexion:    Retraction:     Extension (sitting/prone/supine):  Prone-stretch, NE    Other Tests:     Provisional  Classification:  Derangement - Bilateral, symmetrical, symptoms above elbow    Principle of Management:  Education:  HEP      Equipment provided:    Mechanical therapy (Y/N):  Y-assessing   Extension principle:  Seated ret, ret/ext, sustained ext in prong     Lateral principle:    Flexion principle:       Other:      ASSESSMENT/PLAN:    Patient is a 58 year old male with cervical complaints.    Patient has the following significant findings with corresponding treatment plan.                Diagnosis 1:  Neck pain  Pain -  manual therapy, directional preference exercise and home program  Decreased ROM/flexibility - manual therapy, therapeutic exercise and home program  Decreased function - therapeutic activities and home program    Therapy Evaluation Codes:   1) History comprised of:   Personal factors that impact the plan of care:      None.    Comorbidity factors that impact the plan of care are:      High blood pressure.     Medications impacting care: High blood pressure.  2) Examination of Body Systems comprised of:   Body structures and functions that impact the plan of care:      Cervical spine.   Activity limitations that impact the plan of care are:      Driving.  3) Clinical presentation characteristics are:   Stable/Uncomplicated.  4) Decision-Making    Low complexity using standardized patient assessment instrument and/or measureable assessment of functional outcome.  Cumulative Therapy Evaluation is: Low complexity.    Previous and current functional limitations:  (See Goal Flow Sheet for this information)    Short term and Long term goals: (See Goal Flow Sheet for this information)     Communication ability:  Patient appears to be able to clearly communicate and understand verbal and written communication and follow directions correctly.  Treatment Explanation - The following has been discussed with the patient:   RX ordered/plan of care  Anticipated outcomes  Possible risks and side effects  This  patient would benefit from PT intervention to resume normal activities.   Rehab potential is good.    Frequency:  1 X week, once daily  Duration:  for 8 weeks  Discharge Plan:  Achieve all LTG.  Independent in home treatment program.  Reach maximal therapeutic benefit.    Please refer to the daily flowsheet for treatment today, total treatment time and time spent performing 1:1 timed codes.

## 2018-12-03 NOTE — MR AVS SNAPSHOT
After Visit Summary   12/3/2018    Ruddy Aaron    MRN: [id number]           Patient Information     Date Of Birth          1960        Visit Information        Provider Department      12/3/2018 9:00 AM Annabel Herzog, PT [facility] Physical Therapy        Today's Diagnoses     Cervicalgia           Follow-ups after your visit        Who to contact     If you have questions or need follow up information about today's clinic visit or your schedule please contact [facility] PHYSICAL THERAPY directly at [phone].  Normal or non-critical lab and imaging results will be communicated to you by [employer], letter or phone within 4 business days after the clinic has received the results. If you do not hear from us within 7 days, please contact the clinic through whoactuallyt or phone. If you have a critical or abnormal lab result, we will notify you by phone as soon as possible.  Submit refill requests through [employer] or call your pharmacy and they will forward the refill request to us. Please allow 3 business days for your refill to be completed.          Additional Information About Your Visit        MyChart Information     [employer] gives you secure access to your electronic health record. If you see a primary care provider, you can also send messages to your care team and make appointments. If you have questions, please call your primary care clinic.  If you do not have a primary care provider, please call [phone] and they will assist you.        Care EveryWhere ID     This is your Care EveryWhere ID. This could be used by other organizations to access your [place] medical records  [id number]         Blood Pressure from Last 3 Encounters:   11/26/18 140/87   03/01/18 132/80   05/23/17 130/84    Weight from Last 3 Encounters:   11/26/18 87.5 kg (192 lb 12.8 oz)   03/01/18 86.6 kg (191 lb)   05/23/17 85.5 kg (188 lb 9.6 oz)              We Performed the  Following     HC PT EVAL, LOW COMPLEXITY     PHILLY INITIAL EVAL REPORT     MANUAL THER TECH,1+REGIONS,EA 15 MIN     PHYSICAL THERAPY REFERRAL (Internal)     THERAPEUTIC EXERCISES        Primary Care Provider Office Phone # Fax #    Berry Lazo [phone]       [address]  [facility] [address]        Equal Access to Services     [facility]: Hadii aad ku hadasho Soomaali, waaxda luqadaha, qaybta kaalmada adeegyada, waxay idiin hayaan adeeg kharash la'aan . So [facility] [phone].    ATENCIÓN: Si habla español, tiene a abraham disposición servicios gratuitos de asistencia lingüística. Llame al [phone].    We comply with applicable federal civil rights laws and [place] laws. We do not discriminate on the basis of race, color, national origin, age, disability, sex, sexual orientation, or gender identity.            Thank you!     Thank you for choosing [facility] PHYSICAL THERAPY  for your care. Our goal is always to provide you with excellent care. Hearing back from our patients is one way we can continue to improve our services. Please take a few minutes to complete the written survey that you may receive in the mail after your visit with us. Thank you!             Your Updated Medication List - Protect others around you: Learn how to safely use, store and throw away your medicines at www.disposemymeds.org.          This list is accurate as of 12/3/18 10:41 AM.  Always use your most recent med list.                   Brand Name Dispense Instructions for use Diagnosis    ASPIRIN 81 PO      Take 1 tablet by mouth daily        cyclobenzaprine 10 MG tablet    FLEXERIL    30 tablet    Take 0.5 tablets (5 mg) by mouth nightly as needed for muscle spasms    Cervicalgia       olmesartan 20 MG tablet    BENICAR    90 tablet    Take 1 tablet (20 mg) by mouth daily    Hypertension goal BP (blood pressure) < 140/90

## 2019-03-11 ENCOUNTER — OFFICE VISIT (OUTPATIENT)
Dept: FAMILY MEDICINE | Facility: CLINIC | Age: 59
End: 2019-03-11
Payer: COMMERCIAL

## 2019-03-11 VITALS
BODY MASS INDEX: 27.63 KG/M2 | OXYGEN SATURATION: 95 % | WEIGHT: 193 LBS | TEMPERATURE: 97.7 F | DIASTOLIC BLOOD PRESSURE: 82 MMHG | SYSTOLIC BLOOD PRESSURE: 124 MMHG | HEART RATE: 88 BPM | RESPIRATION RATE: 16 BRPM | HEIGHT: 70 IN

## 2019-03-11 DIAGNOSIS — R21 RASH AND NONSPECIFIC SKIN ERUPTION: ICD-10-CM

## 2019-03-11 DIAGNOSIS — I10 HYPERTENSION GOAL BP (BLOOD PRESSURE) < 130/80: Primary | ICD-10-CM

## 2019-03-11 LAB
ANION GAP SERPL CALCULATED.3IONS-SCNC: 7 MMOL/L (ref 3–14)
BUN SERPL-MCNC: 22 MG/DL (ref 7–30)
CALCIUM SERPL-MCNC: 9.2 MG/DL (ref 8.5–10.1)
CHLORIDE SERPL-SCNC: 108 MMOL/L (ref 94–109)
CO2 SERPL-SCNC: 27 MMOL/L (ref 20–32)
CREAT SERPL-MCNC: 1.07 MG/DL (ref 0.66–1.25)
GFR SERPL CREATININE-BSD FRML MDRD: 77 ML/MIN/{1.73_M2}
GLUCOSE SERPL-MCNC: 107 MG/DL (ref 70–99)
POTASSIUM SERPL-SCNC: 4.1 MMOL/L (ref 3.4–5.3)
SODIUM SERPL-SCNC: 142 MMOL/L (ref 133–144)

## 2019-03-11 PROCEDURE — 82043 UR ALBUMIN QUANTITATIVE: CPT | Performed by: INTERNAL MEDICINE

## 2019-03-11 PROCEDURE — 80048 BASIC METABOLIC PNL TOTAL CA: CPT | Performed by: INTERNAL MEDICINE

## 2019-03-11 PROCEDURE — 36415 COLL VENOUS BLD VENIPUNCTURE: CPT | Performed by: INTERNAL MEDICINE

## 2019-03-11 PROCEDURE — 99213 OFFICE O/P EST LOW 20 MIN: CPT | Performed by: INTERNAL MEDICINE

## 2019-03-11 RX ORDER — HYDROCORTISONE VALERATE CREAM 2 MG/G
CREAM TOPICAL 2 TIMES DAILY
Qty: 15 G | Refills: 11 | Status: SHIPPED | OUTPATIENT
Start: 2019-03-11 | End: 2020-06-18

## 2019-03-11 RX ORDER — HYDROCORTISONE VALERATE CREAM 2 MG/G
CREAM TOPICAL 2 TIMES DAILY
COMMUNITY
End: 2019-03-11

## 2019-03-11 RX ORDER — METOPROLOL SUCCINATE 25 MG/1
25 TABLET, EXTENDED RELEASE ORAL DAILY
Qty: 90 TABLET | Refills: 3 | Status: SHIPPED | OUTPATIENT
Start: 2019-03-11 | End: 2020-02-18

## 2019-03-11 ASSESSMENT — PAIN SCALES - GENERAL: PAINLEVEL: NO PAIN (0)

## 2019-03-11 ASSESSMENT — MIFFLIN-ST. JEOR: SCORE: 1715.66

## 2019-03-11 NOTE — PATIENT INSTRUCTIONS
At [facility], we strive to deliver an exceptional experience to you, every time we see you.  If you receive a survey in the mail, please send us back your thoughts. We really do value your feedback.    Based on your medical history, these are the current health maintenance/preventive care services that you are due for (some may have been done at this visit.)  Health Maintenance Due   Topic Date Due     HIV SCREEN (SYSTEM ASSIGNED)  09/25/1980     ZOSTER IMMUNIZATION (1 of 2) 09/25/2012     PREVENTIVE CARE VISIT  07/11/2017     ADVANCE DIRECTIVE PLANNING Q5 YRS  09/25/2017     BMP Q1 YR  03/01/2019     MICROALBUMIN Q1 YEAR  03/01/2019     PHQ-2 Q1 YR  03/01/2019         Suggested websites for health information:  Www.Pull[employer] : Up to date and easily searchable information on multiple topics.  Www.[employer]piS[employer].gov : medication info, interactive tutorials, watch real surgeries online  Www.familydoctor.org : good info from the Academy of Family Physicians  Www.cdc.gov : public health info, travel advisories, epidemics (H1N1)  Www.aap.org : children's health info, normal development, vaccinations  Www.health.[facility].[place].us : [place] dept of health, public health issues in [place], N1N1    Your care team:                            Family Medicine Internal Medicine   MD Berry Gutiérrez MD Shantel Branch-Fleming, MD Katya Georgiev PA-C Nam Ho, MD Pediatrics   TUNG Santillan, MD Flori López CNP, MD Deborah Mielke, MD Kim Thein, APRSHALONDA SALINAS      Clinic hours: Monday - Thursday 7 am-7 pm; Fridays 7 am-5 pm.   Urgent care: Monday - Friday 11 am-9 pm; Saturday and Sunday 9 am-5 pm.  Pharmacy : Monday -Thursday 8 am-8 pm; Friday 8 am-6 pm; Saturday and Sunday 9 am-5 pm.     Clinic: [phone]   Pharmacy: [phone]

## 2019-03-11 NOTE — PROGRESS NOTES
SUBJECTIVE:   Ruddy Aaron is a 58 year old male who presents to clinic today for the following health issues:    Hypertension Follow-up      Outpatient blood pressures are not being checked.    Low Salt Diet: not monitoring salt    Amount of exercise or physical activity: walking     Problems taking medications regularly: No    Medication side effects: none    Diet: regular (no restrictions)  -discuss some Olmesartan due to recalls      Problem list and histories reviewed & adjusted, as indicated.  Additional history: as documented    Patient Active Problem List   Diagnosis     CARDIOVASCULAR SCREENING; LDL GOAL LESS THAN 160     Headaches, tension-type     Chronic eczema     Essential hypertension with goal blood pressure less than 140/90     Seasonal allergic rhinitis     Overweight (BMI 25.0-29.9)     Neck pain     Nonallergic rhinitis     Combined forms of age-related cataract, mild, of both eyes     Cervicalgia     Past Surgical History:   Procedure Laterality Date     VASECTOMY         Social History     Tobacco Use     Smoking status: Never Smoker     Smokeless tobacco: Never Used   Substance Use Topics     Alcohol use: Yes     Alcohol/week: 0.6 oz     Types: 1 Standard drinks or equivalent per week     Comment: ocassional     Family History   Problem Relation Age of Onset     Hypertension Father      Eye Disorder Mother      Eye Disorder Other         mother's side of family has cataracts, and glaucoma?     Asthma No family hx of      C.A.D. No family hx of      Diabetes No family hx of      Cerebrovascular Disease No family hx of      Breast Cancer No family hx of      Cancer - colorectal No family hx of      Prostate Cancer No family hx of          No Known Allergies  Recent Labs   Lab Test 03/11/19  1249 03/01/18  0809 07/01/16  0930 06/23/15  0828  05/24/12  1041   LDL  --   --  113* 91  --  111   HDL  --   --  49 47  --  48   TRIG  --   --  77 121  --  127   ALT  --  36  --  47  --   --    CR 1.07  "1.08 1.12 1.12   < > 1.06   GFRESTIMATED 77 71 68 69   < > 74   GFRESTBLACK 89 86 83 83   < > 90   POTASSIUM 4.1 3.8 4.0 3.9   < > 4.3   TSH  --   --   --   --   --  2.46    < > = values in this interval not displayed.      BP Readings from Last 3 Encounters:   03/11/19 124/82   11/26/18 140/87   03/01/18 132/80    Wt Readings from Last 3 Encounters:   03/11/19 87.5 kg (193 lb)   11/26/18 87.5 kg (192 lb 12.8 oz)   03/01/18 86.6 kg (191 lb)                  Labs reviewed in EPIC    Reviewed and updated as needed this visit by clinical staff       Reviewed and updated as needed this visit by Provider         ROS:  CONSTITUTIONAL: NEGATIVE for fever, chills, change in weight  INTEGUMENTARY/SKIN: NEGATIVE for worrisome rashes, moles or lesions  EYES: NEGATIVE for vision changes or irritation  ENT/MOUTH: NEGATIVE for ear, mouth and throat problems  RESP: NEGATIVE for significant cough or SOB  CV: NEGATIVE for chest pain, palpitations or peripheral edema  GI: NEGATIVE for nausea, abdominal pain, heartburn, or change in bowel habits  : NEGATIVE for frequency, dysuria, or hematuria  MUSCULOSKELETAL: NEGATIVE for significant arthralgias or myalgia  NEURO: NEGATIVE for weakness, dizziness or paresthesias  ENDOCRINE: NEGATIVE for temperature intolerance, skin/hair changes  HEME: NEGATIVE for bleeding problems  PSYCHIATRIC: NEGATIVE for changes in mood or affect    OBJECTIVE:     /82 (BP Location: Left arm, Patient Position: Chair, Cuff Size: Adult Regular)   Pulse 88   Temp 97.7  F (36.5  C) (Oral)   Resp 16   Ht 1.784 m (5' 10.25\")   Wt 87.5 kg (193 lb)   SpO2 95%   BMI 27.50 kg/m    Body mass index is 27.5 kg/m .  GENERAL: healthy, alert and no distress  EYES: Eyes grossly normal to inspection, PERRL and conjunctivae and sclerae normal  HENT: ear canals and TM's normal, nose and mouth without ulcers or lesions  NECK: no adenopathy, no asymmetry, masses, or scars and thyroid normal to palpation  RESP: lungs " clear to auscultation - no rales, rhonchi or wheezes  CV: regular rate and rhythm, normal S1 S2, no S3 or S4, no murmur, click or rub, no peripheral edema and peripheral pulses strong  ABDOMEN: soft, nontender, no hepatosplenomegaly, no masses and bowel sounds normal  MS: no gross musculoskeletal defects noted, no edema  SKIN: no suspicious lesions or rashes  NEURO: Normal strength and tone, mentation intact and speech normal  PSYCH: mentation appears normal, affect normal/bright    Diagnostic Test Results:  Results for orders placed or performed in visit on 03/11/19   BASIC METABOLIC PANEL   Result Value Ref Range    Sodium 142 133 - 144 mmol/L    Potassium 4.1 3.4 - 5.3 mmol/L    Chloride 108 94 - 109 mmol/L    Carbon Dioxide 27 20 - 32 mmol/L    Anion Gap 7 3 - 14 mmol/L    Glucose 107 (H) 70 - 99 mg/dL    Urea Nitrogen 22 7 - 30 mg/dL    Creatinine 1.07 0.66 - 1.25 mg/dL    GFR Estimate 77 >60 mL/min/[1.73_m2]    GFR Estimate If Black 89 >60 mL/min/[1.73_m2]    Calcium 9.2 8.5 - 10.1 mg/dL   Albumin Random Urine Quantitative with Creat Ratio   Result Value Ref Range    Creatinine Urine 336 mg/dL    Albumin Urine mg/L 27 mg/L    Albumin Urine mg/g Cr 8.04 0 - 17 mg/g Cr       ASSESSMENT/PLAN:     1. Hypertension goal BP (blood pressure) < 130/80  Comments: Switch to a different drug class though possibility of recalls among ARBs.  - BASIC METABOLIC PANEL  - metoprolol succinate ER (TOPROL-XL) 25 MG 24 hr tablet; Take 1 tablet (25 mg) by mouth daily  Dispense: 90 tablet; Refill: 3  - Albumin Random Urine Quantitative with Creat Ratio    2. Rash and nonspecific skin eruption    - hydrocortisone (WESTCORT) 0.2 % external cream; Apply topically 2 times daily Apply to affected areas.  Dispense: 15 g; Refill: 11    Follow-up visit if condition worsens.    Berry Lazo MD  [facility]

## 2019-03-12 LAB
CREAT UR-MCNC: 336 MG/DL
MICROALBUMIN UR-MCNC: 27 MG/L
MICROALBUMIN/CREAT UR: 8.04 MG/G CR (ref 0–17)

## 2019-03-15 ENCOUNTER — MYC MEDICAL ADVICE (OUTPATIENT)
Dept: FAMILY MEDICINE | Facility: CLINIC | Age: 59
End: 2019-03-15

## 2019-03-21 ENCOUNTER — MYC MEDICAL ADVICE (OUTPATIENT)
Dept: FAMILY MEDICINE | Facility: CLINIC | Age: 59
End: 2019-03-21

## 2019-05-30 PROBLEM — M54.2 CERVICALGIA: Status: RESOLVED | Noted: 2018-12-03 | Resolved: 2019-05-30

## 2019-10-01 ENCOUNTER — HEALTH MAINTENANCE LETTER (OUTPATIENT)
Age: 59
End: 2019-10-01

## 2020-01-22 ENCOUNTER — DOCUMENTATION ONLY (OUTPATIENT)
Dept: FAMILY MEDICINE | Facility: CLINIC | Age: 60
End: 2020-01-22

## 2020-01-22 DIAGNOSIS — Z20.828 EXPOSURE TO THE FLU: Primary | ICD-10-CM

## 2020-01-22 RX ORDER — OSELTAMIVIR PHOSPHATE 75 MG/1
75 CAPSULE ORAL DAILY
Qty: 10 CAPSULE | Refills: 0 | Status: SHIPPED | OUTPATIENT
Start: 2020-01-22 | End: 2020-02-01

## 2020-01-22 NOTE — PROGRESS NOTES
I spoke with patient's wife, who states that one of their grandchildren has influenza.    Rx for Tamiflu sent to patient's pharmacy.

## 2020-02-17 DIAGNOSIS — I10 HYPERTENSION GOAL BP (BLOOD PRESSURE) < 130/80: ICD-10-CM

## 2020-02-17 NOTE — TELEPHONE ENCOUNTER
"Requested Prescriptions   Pending Prescriptions Disp Refills     metoprolol succinate ER (TOPROL-XL) 25 MG 24 hr tablet  Last Written Prescription Date:  03/11/19  Last Fill Quantity: 90,  # refills: 3   Last Office Visit with G, P or [facility] prescribing provider:  03/11/19-Vocal   Future Office Visit:    90 tablet 3     Sig: Take 1 tablet (25 mg) by mouth daily       Beta-Blockers Protocol Passed - 2/17/2020 10:24 AM        Passed - Blood pressure under 140/90 in past 12 months     BP Readings from Last 3 Encounters:   03/11/19 124/82   11/26/18 140/87   03/01/18 132/80                 Passed - Patient is age 6 or older        Passed - Recent (12 mo) or future (30 days) visit within the authorizing provider's specialty     Patient has had an office visit with the authorizing provider or a provider within the authorizing providers department within the previous 12 mos or has a future within next 30 days. See \"Patient Info\" tab in inbasket, or \"Choose Columns\" in Meds & Orders section of the refill encounter.              Passed - Medication is active on med list          "

## 2020-02-18 RX ORDER — METOPROLOL SUCCINATE 25 MG/1
25 TABLET, EXTENDED RELEASE ORAL DAILY
Qty: 30 TABLET | Refills: 0 | Status: SHIPPED | OUTPATIENT
Start: 2020-02-18 | End: 2020-03-19

## 2020-02-18 NOTE — TELEPHONE ENCOUNTER
Prescription approved per [facility] Refill Protocol.    Radha Hernandez RN  [facility]

## 2020-03-18 ENCOUNTER — MYC REFILL (OUTPATIENT)
Dept: FAMILY MEDICINE | Facility: CLINIC | Age: 60
End: 2020-03-18

## 2020-03-18 DIAGNOSIS — I10 HYPERTENSION GOAL BP (BLOOD PRESSURE) < 130/80: ICD-10-CM

## 2020-03-18 RX ORDER — METOPROLOL SUCCINATE 25 MG/1
25 TABLET, EXTENDED RELEASE ORAL DAILY
Qty: 30 TABLET | Refills: 0 | Status: CANCELLED | OUTPATIENT
Start: 2020-03-18

## 2020-03-18 NOTE — TELEPHONE ENCOUNTER
"Requested Prescriptions   Pending Prescriptions Disp Refills     metoprolol succinate ER (TOPROL-XL) 25 MG 24 hr tablet  Last Written Prescription Date:  02/18/2020  Last Fill Quantity: 30,  # refills: 0   Last Office Visit with G, P or [facility] prescribing provider:  03/11/19-Vocal   Future Office Visit:    30 tablet 0     Sig: Take 1 tablet (25 mg) by mouth daily       Beta-Blockers Protocol Failed - 3/18/2020 10:17 AM        Failed - Blood pressure under 140/90 in past 12 months     BP Readings from Last 3 Encounters:   03/11/19 124/82   11/26/18 140/87   03/01/18 132/80                 Failed - Recent (12 mo) or future (30 days) visit within the authorizing provider's specialty     Patient has had an office visit with the authorizing provider or a provider within the authorizing providers department within the previous 12 mos or has a future within next 30 days. See \"Patient Info\" tab in inbasket, or \"Choose Columns\" in Meds & Orders section of the refill encounter.              Passed - Patient is age 6 or older        Passed - Medication is active on med list             "

## 2020-03-19 NOTE — TELEPHONE ENCOUNTER
Duplicate request.  Medication refilled today by PCP, see current med list for details.    Oneyda Valentin RN  [facility]

## 2020-06-10 DIAGNOSIS — I10 HYPERTENSION GOAL BP (BLOOD PRESSURE) < 130/80: ICD-10-CM

## 2020-06-10 NOTE — TELEPHONE ENCOUNTER
Team to please call patient and schedule medication recheck appointment. Please find out if patient has enough medication to last until appointment once scheduled then route back to refill pool. Thank you.        Cira Cox RN, BSN, PHN

## 2020-06-11 ENCOUNTER — MYC REFILL (OUTPATIENT)
Dept: FAMILY MEDICINE | Facility: CLINIC | Age: 60
End: 2020-06-11

## 2020-06-11 DIAGNOSIS — I10 HYPERTENSION GOAL BP (BLOOD PRESSURE) < 130/80: ICD-10-CM

## 2020-06-11 RX ORDER — METOPROLOL SUCCINATE 25 MG/1
25 TABLET, EXTENDED RELEASE ORAL DAILY
Qty: 90 TABLET | Refills: 0 | Status: CANCELLED | OUTPATIENT
Start: 2020-06-11

## 2020-06-11 NOTE — TELEPHONE ENCOUNTER
This writer attempted to contact pt on 06/11/20      Reason for call schedule virtual visit and left message.      If patient calls back:   Schedule virtual appointment within 2 weeks with primary care, document that pt called and close encounter   Please ask if there is enough medication/supplies to last till scheduled appointment, then route back to RN refill corinne Mary

## 2020-06-12 NOTE — TELEPHONE ENCOUNTER
Duplicate request.  See 6/10/20 telephone and refill request in Chart Review.    Oneyda Valentin RN  [facility]

## 2020-06-16 NOTE — TELEPHONE ENCOUNTER
This writer attempted to contact patient on 06/16/20      Reason for call patient needs to schedule a virtual visit for refills, patient may call our  Appointment ine to schedule  and left message.      If patient  calls back:   Schedule Telephone Visit appointment within 1 week with PCP, document that pt called and ask if patient has enough meds to hold patient until his scheduled appointment, if not please do not close encounter but route this message to our refill RN pool to address the refill.  Marcelino Alberts,  For 1st Floor Primary Care        Marcelino Alberts

## 2020-06-17 RX ORDER — METOPROLOL SUCCINATE 25 MG/1
TABLET, EXTENDED RELEASE ORAL
Qty: 90 TABLET | Refills: 0 | OUTPATIENT
Start: 2020-06-17

## 2020-06-18 ENCOUNTER — VIRTUAL VISIT (OUTPATIENT)
Dept: FAMILY MEDICINE | Facility: CLINIC | Age: 60
End: 2020-06-18
Payer: COMMERCIAL

## 2020-06-18 DIAGNOSIS — R21 RASH AND NONSPECIFIC SKIN ERUPTION: ICD-10-CM

## 2020-06-18 DIAGNOSIS — R73.9 HYPERGLYCEMIA: Primary | ICD-10-CM

## 2020-06-18 DIAGNOSIS — I10 HYPERTENSION GOAL BP (BLOOD PRESSURE) < 130/80: ICD-10-CM

## 2020-06-18 PROCEDURE — 99214 OFFICE O/P EST MOD 30 MIN: CPT | Mod: TEL | Performed by: INTERNAL MEDICINE

## 2020-06-18 RX ORDER — METOPROLOL SUCCINATE 25 MG/1
25 TABLET, EXTENDED RELEASE ORAL DAILY
Qty: 90 TABLET | Refills: 3 | Status: SHIPPED | OUTPATIENT
Start: 2020-06-18 | End: 2021-02-10

## 2020-06-18 RX ORDER — HYDROCORTISONE VALERATE CREAM 2 MG/G
CREAM TOPICAL 2 TIMES DAILY
Qty: 15 G | Refills: 11 | Status: SHIPPED | OUTPATIENT
Start: 2020-06-18 | End: 2021-05-20

## 2020-06-18 ASSESSMENT — PAIN SCALES - GENERAL: PAINLEVEL: NO PAIN (0)

## 2020-06-18 NOTE — PROGRESS NOTES
"Ruddy Aaron is a 59 year old male who is being evaluated via a billable telephone visit.      The patient has been notified of following:     \"This telephone visit will be conducted via a call between you and your physician/provider. We have found that certain health care needs can be provided without the need for a physical exam.  This service lets us provide the care you need with a short phone conversation.  If a prescription is necessary we can send it directly to your pharmacy.  If lab work is needed we can place an order for that and you can then stop by our lab to have the test done at a later time.    Telephone visits are billed at different rates depending on your insurance coverage. During this emergency period, for some insurers they may be billed the same as an in-person visit.  Please reach out to your insurance provider with any questions.    If during the course of the call the physician/provider feels a telephone visit is not appropriate, you will not be charged for this service.\"    Patient has given verbal consent for Telephone visit?  Yes    What phone number would you like to be contacted at? [phone]    How would you like to obtain your AVS? Davey Herman     Ruddy Aaron is a 59 year old male who presents via phone visit today for the following health issues:    HPI  Hypertension Follow-up      Do you check your blood pressure regularly outside of the clinic? Yes     Are you following a low salt diet? Yes    Are your blood pressures ever more than 140 on the top number (systolic) OR more   than 90 on the bottom number (diastolic), for example 140/90? No    How many servings of fruits and vegetables do you eat daily?  0-1    On average, how many sweetened beverages do you drink each day (Examples: soda, juice, sweet tea, etc.  Do NOT count diet or artificially sweetened beverages)?   0    How many days per week do you exercise enough to make your heart beat faster? 7    How many " minutes a day do you exercise enough to make your heart beat faster? 60 or more    How many days per week do you miss taking your medication? 0    COVID19 testing done at work through [facility].        Patient Active Problem List   Diagnosis     CARDIOVASCULAR SCREENING; LDL GOAL LESS THAN 160     Headaches, tension-type     Chronic eczema     Essential hypertension with goal blood pressure less than 140/90     Seasonal allergic rhinitis     Overweight (BMI 25.0-29.9)     Neck pain     Nonallergic rhinitis     Combined forms of age-related cataract, mild, of both eyes     Past Surgical History:   Procedure Laterality Date     VASECTOMY         Social History     Tobacco Use     Smoking status: Never Smoker     Smokeless tobacco: Never Used   Substance Use Topics     Alcohol use: Yes     Alcohol/week: 1.0 standard drinks     Types: 1 Standard drinks or equivalent per week     Comment: ocassional     Family History   Problem Relation Age of Onset     Hypertension Father      Eye Disorder Mother      Eye Disorder Other         mother's side of family has cataracts, and glaucoma?     Asthma No family hx of      C.A.D. No family hx of      Diabetes No family hx of      Cerebrovascular Disease No family hx of      Breast Cancer No family hx of      Cancer - colorectal No family hx of      Prostate Cancer No family hx of          No Known Allergies  Recent Labs   Lab Test 03/11/19  1249 03/01/18  0809 07/01/16  0930 06/23/15  0828  05/24/12  1041   LDL  --   --  113* 91  --  111   HDL  --   --  49 47  --  48   TRIG  --   --  77 121  --  127   ALT  --  36  --  47  --   --    CR 1.07 1.08 1.12 1.12   < > 1.06   GFRESTIMATED 77 71 68 69   < > 74   GFRESTBLACK 89 86 83 83   < > 90   POTASSIUM 4.1 3.8 4.0 3.9   < > 4.3   TSH  --   --   --   --   --  2.46    < > = values in this interval not displayed.      BP Readings from Last 3 Encounters:   03/11/19 124/82   11/26/18 140/87   03/01/18 132/80    Wt Readings from Last 3  Encounters:   03/11/19 87.5 kg (193 lb)   11/26/18 87.5 kg (192 lb 12.8 oz)   03/01/18 86.6 kg (191 lb)                    Reviewed and updated as needed this visit by Provider         Review of Systems   CONSTITUTIONAL: NEGATIVE for fever, chills, change in weight  INTEGUMENTARY/SKIN: POSITIVE for history of chronic eczema. Patient is requesting refills for Hydrocortisone.  EYES: NEGATIVE for vision changes or irritation  ENT/MOUTH: NEGATIVE for ear, mouth and throat problems  RESP: NEGATIVE for significant cough or SOB  CV: NEGATIVE for chest pain, palpitations or peripheral edema  GI: NEGATIVE for nausea, abdominal pain, heartburn, or change in bowel habits  : NEGATIVE for frequency, dysuria, or hematuria  MUSCULOSKELETAL: NEGATIVE for significant arthralgias or myalgia  NEURO: NEGATIVE for weakness, dizziness or paresthesias  ENDOCRINE: NEGATIVE for temperature intolerance, skin/hair changes  HEME: NEGATIVE for bleeding problems  PSYCHIATRIC: NEGATIVE for changes in mood or affect       Objective   Reported vitals:  There were no vitals taken for this visit.     Remainder of exam unable to be completed due to telephone visits    Diagnostic Test Results:  Epic reviewed.        Assessment/Plan:  1. Hypertension goal BP (blood pressure) < 130/80    - BASIC METABOLIC PANEL; Standing  - Albumin Random Urine Quantitative with Creat Ratio; Standing  - metoprolol succinate ER (TOPROL-XL) 25 MG 24 hr tablet; Take 1 tablet (25 mg) by mouth daily  Dispense: 90 tablet; Refill: 3    2. Rash and nonspecific skin eruption    - hydrocortisone (WESTCORT) 0.2 % external cream; Apply topically 2 times daily Apply to affected areas.  Dispense: 15 g; Refill: 11    Follow-up in 6 months.    Phone call duration:  7 minutes  Start: 9:01 AM  End 9:08 AM    Berry Lazo MD

## 2020-06-18 NOTE — PATIENT INSTRUCTIONS
At [facility], we strive to deliver an exceptional experience to you, every time we see you. If you receive a survey, please complete it as we do value your feedback.  If you have MyChart, you can expect to receive results automatically within 24 hours of their completion.  Your provider will send a note interpreting your results as well.   If you do not have MyChart, you should receive your results in about a week by mail.    Your care team:                            Family Medicine Internal Medicine   MD Berry Gutiérrez MD Shantel Branch-Fleming, MD Katya Georgiev PA-C Megan Hill, APRSHALONDA Fallon, MD Pediatrics   Guanakito Nunez, PAKAYLEN Pete, MD Yamile Lopez APRN CNP   MD Flori Corey MD Deborah Mielke, MD Kim Thein, APRN [facility]      Clinic hours: Monday - Thursday 7 am-7 pm; Fridays 7 am-5 pm.   Urgent care: Monday - Friday 11 am-9 pm; Saturday and Sunday 9 am-5 pm.    Clinic: [phone]       [facility]: Monday - Thursday 8 am - 7 pm; Friday 8 am - 6 pm  [facility]: [phone]     Use [email or web address] for 24/7 diagnosis and treatment of dozens of conditions.

## 2020-06-21 DIAGNOSIS — I10 HYPERTENSION GOAL BP (BLOOD PRESSURE) < 130/80: ICD-10-CM

## 2020-06-21 PROCEDURE — 80048 BASIC METABOLIC PNL TOTAL CA: CPT | Performed by: INTERNAL MEDICINE

## 2020-06-21 PROCEDURE — 82043 UR ALBUMIN QUANTITATIVE: CPT | Performed by: INTERNAL MEDICINE

## 2020-06-21 PROCEDURE — 36415 COLL VENOUS BLD VENIPUNCTURE: CPT | Performed by: INTERNAL MEDICINE

## 2020-06-22 LAB
ANION GAP SERPL CALCULATED.3IONS-SCNC: 8 MMOL/L (ref 3–14)
BUN SERPL-MCNC: 19 MG/DL (ref 7–30)
CALCIUM SERPL-MCNC: 8.9 MG/DL (ref 8.5–10.1)
CHLORIDE SERPL-SCNC: 108 MMOL/L (ref 94–109)
CO2 SERPL-SCNC: 24 MMOL/L (ref 20–32)
CREAT SERPL-MCNC: 0.96 MG/DL (ref 0.66–1.25)
CREAT UR-MCNC: 236 MG/DL
GFR SERPL CREATININE-BSD FRML MDRD: 87 ML/MIN/{1.73_M2}
GLUCOSE SERPL-MCNC: 148 MG/DL (ref 70–99)
MICROALBUMIN UR-MCNC: 37 MG/L
MICROALBUMIN/CREAT UR: 15.76 MG/G CR (ref 0–17)
POTASSIUM SERPL-SCNC: 3.8 MMOL/L (ref 3.4–5.3)
SODIUM SERPL-SCNC: 140 MMOL/L (ref 133–144)

## 2020-07-06 DIAGNOSIS — I10 HYPERTENSION GOAL BP (BLOOD PRESSURE) < 130/80: ICD-10-CM

## 2020-07-06 DIAGNOSIS — R73.9 HYPERGLYCEMIA: ICD-10-CM

## 2020-07-06 LAB
ANION GAP SERPL CALCULATED.3IONS-SCNC: 7 MMOL/L (ref 3–14)
BUN SERPL-MCNC: 15 MG/DL (ref 7–30)
CALCIUM SERPL-MCNC: 9.1 MG/DL (ref 8.5–10.1)
CHLORIDE SERPL-SCNC: 106 MMOL/L (ref 94–109)
CO2 SERPL-SCNC: 25 MMOL/L (ref 20–32)
CREAT SERPL-MCNC: 1.07 MG/DL (ref 0.66–1.25)
GFR SERPL CREATININE-BSD FRML MDRD: 76 ML/MIN/{1.73_M2}
GLUCOSE SERPL-MCNC: 93 MG/DL (ref 70–99)
HBA1C MFR BLD: 5.3 % (ref 0–5.6)
POTASSIUM SERPL-SCNC: 4 MMOL/L (ref 3.4–5.3)
SODIUM SERPL-SCNC: 138 MMOL/L (ref 133–144)

## 2020-07-06 PROCEDURE — 82043 UR ALBUMIN QUANTITATIVE: CPT | Performed by: INTERNAL MEDICINE

## 2020-07-06 PROCEDURE — 80048 BASIC METABOLIC PNL TOTAL CA: CPT | Performed by: INTERNAL MEDICINE

## 2020-07-06 PROCEDURE — 36415 COLL VENOUS BLD VENIPUNCTURE: CPT | Performed by: INTERNAL MEDICINE

## 2020-07-06 PROCEDURE — 83036 HEMOGLOBIN GLYCOSYLATED A1C: CPT | Performed by: INTERNAL MEDICINE

## 2020-07-07 LAB
CREAT UR-MCNC: 194 MG/DL
MICROALBUMIN UR-MCNC: 30 MG/L
MICROALBUMIN/CREAT UR: 15.52 MG/G CR (ref 0–17)

## 2020-08-26 ENCOUNTER — OFFICE VISIT (OUTPATIENT)
Dept: FAMILY MEDICINE | Facility: CLINIC | Age: 60
End: 2020-08-26
Payer: COMMERCIAL

## 2020-08-26 VITALS
SYSTOLIC BLOOD PRESSURE: 144 MMHG | DIASTOLIC BLOOD PRESSURE: 90 MMHG | WEIGHT: 196 LBS | HEIGHT: 70 IN | BODY MASS INDEX: 28.06 KG/M2 | HEART RATE: 68 BPM | TEMPERATURE: 98.2 F | RESPIRATION RATE: 18 BRPM | OXYGEN SATURATION: 99 %

## 2020-08-26 DIAGNOSIS — I10 HYPERTENSION GOAL BP (BLOOD PRESSURE) < 130/80: Primary | ICD-10-CM

## 2020-08-26 DIAGNOSIS — Z13.6 CARDIOVASCULAR SCREENING; LDL GOAL LESS THAN 130: ICD-10-CM

## 2020-08-26 LAB
ALT SERPL W P-5'-P-CCNC: 67 U/L (ref 0–70)
CHOLEST SERPL-MCNC: 200 MG/DL
HDLC SERPL-MCNC: 56 MG/DL
LDLC SERPL CALC-MCNC: 124 MG/DL
NONHDLC SERPL-MCNC: 144 MG/DL
TRIGL SERPL-MCNC: 99 MG/DL

## 2020-08-26 PROCEDURE — 36415 COLL VENOUS BLD VENIPUNCTURE: CPT | Performed by: INTERNAL MEDICINE

## 2020-08-26 PROCEDURE — 84460 ALANINE AMINO (ALT) (SGPT): CPT | Performed by: INTERNAL MEDICINE

## 2020-08-26 PROCEDURE — 80061 LIPID PANEL: CPT | Performed by: INTERNAL MEDICINE

## 2020-08-26 PROCEDURE — 99213 OFFICE O/P EST LOW 20 MIN: CPT | Performed by: INTERNAL MEDICINE

## 2020-08-26 RX ORDER — HYDROCHLOROTHIAZIDE 25 MG/1
25 TABLET ORAL
Qty: 90 TABLET | Refills: 1 | Status: SHIPPED | OUTPATIENT
Start: 2020-08-26 | End: 2020-12-09

## 2020-08-26 ASSESSMENT — PAIN SCALES - GENERAL: PAINLEVEL: MODERATE PAIN (5)

## 2020-08-26 ASSESSMENT — MIFFLIN-ST. JEOR: SCORE: 1724.27

## 2020-08-26 NOTE — PROGRESS NOTES
"Subjective     Dev JUAN Aaron is a 59 year old male who presents to clinic today for the following health issues:    HPI       Hypertension Follow-up      Do you check your blood pressure regularly outside of the clinic? Yes     Are you following a low salt diet? Yes    Are your blood pressures ever more than 140 on the top number (systolic) OR more   than 90 on the bottom number (diastolic), for example 140/90? Yes      How many servings of fruits and vegetables do you eat daily?  2-3    On average, how many sweetened beverages do you drink each day (Examples: soda, juice, sweet tea, etc.  Do NOT count diet or artificially sweetened beverages)?   1    How many days per week do you exercise enough to make your heart beat faster?5- 7    How many minutes a day do you exercise enough to make your heart beat faster? 30 - 60    How many days per week do you miss taking your medication? 0      Review of Systems   CONSTITUTIONAL: NEGATIVE for fever, chills, change in weight  INTEGUMENTARY/SKIN: NEGATIVE for worrisome rashes, moles or lesions  EYES: NEGATIVE for vision changes or irritation  ENT/MOUTH: NEGATIVE for ear, mouth and throat problems  RESP: NEGATIVE for significant cough or SOB  CV: NEGATIVE for chest pain, palpitations or peripheral edema  GI: NEGATIVE for nausea, abdominal pain, heartburn, or change in bowel habits  : NEGATIVE for frequency, dysuria, or hematuria  MUSCULOSKELETAL: NEGATIVE for significant arthralgias or myalgia  NEURO: NEGATIVE for weakness, dizziness or paresthesias  ENDOCRINE: NEGATIVE for temperature intolerance, skin/hair changes  HEME: NEGATIVE for bleeding problems  PSYCHIATRIC: NEGATIVE for changes in mood or affect      Objective    BP (!) 151/94 (BP Location: Right arm, Patient Position: Chair, Cuff Size: Adult Large)   Pulse 68   Temp 98.2  F (36.8  C) (Oral)   Resp 18   Ht 1.784 m (5' 10.25\")   Wt 88.9 kg (196 lb)   SpO2 99%   BMI 27.92 kg/m    Body mass index is 27.92 " "kg/m .  Physical Exam   GENERAL: healthy, alert and no distress  EYES: Eyes grossly normal to inspection, PERRL and conjunctivae and sclerae normal  HENT: ear canals and TM's normal, nose and mouth without ulcers or lesions  NECK: no adenopathy, no asymmetry, masses, or scars and thyroid normal to palpation  RESP: lungs clear to auscultation - no rales, rhonchi or wheezes  CV: regular rate and rhythm, normal S1 S2, no S3 or S4, no murmur, click or rub, no peripheral edema and peripheral pulses strong  ABDOMEN: soft, nontender, no hepatosplenomegaly, no masses and bowel sounds normal  MS: no gross musculoskeletal defects noted, no edema  SKIN: no suspicious lesions or rashes  NEURO: Normal strength and tone, mentation intact and speech normal  PSYCH: mentation appears normal, affect normal/bright    DIAGNOSTICS  Epic reviewed.        Assessment & Plan   1. Hypertension goal BP (blood pressure) < 130/80    - hydrochlorothiazide (HYDRODIURIL) 25 MG tablet; Take 1 tablet (25 mg) by mouth daily (with breakfast)  Dispense: 90 tablet; Refill: 1    2. CARDIOVASCULAR SCREENING; LDL GOAL LESS THAN 130    - Lipid panel reflex to direct LDL Fasting  - ALT         BMI:   Estimated body mass index is 27.92 kg/m  as calculated from the following:    Height as of this encounter: 1.784 m (5' 10.25\").    Weight as of this encounter: 88.9 kg (196 lb).   Weight management plan: diet and exercise.        FUTURE APPOINTMENTS:       - Follow-up visit in 4 weeks.      Berry Lazo MD  [facility]    "

## 2020-09-09 ENCOUNTER — MYC MEDICAL ADVICE (OUTPATIENT)
Dept: FAMILY MEDICINE | Facility: CLINIC | Age: 60
End: 2020-09-09

## 2020-09-09 DIAGNOSIS — E78.5 HYPERLIPIDEMIA LDL GOAL <100: Primary | ICD-10-CM

## 2020-09-11 RX ORDER — ATORVASTATIN CALCIUM 10 MG/1
10 TABLET, FILM COATED ORAL DAILY
Qty: 90 TABLET | Refills: 3 | Status: SHIPPED | OUTPATIENT
Start: 2020-09-11 | End: 2021-02-10

## 2021-01-15 ENCOUNTER — HEALTH MAINTENANCE LETTER (OUTPATIENT)
Age: 61
End: 2021-01-15

## 2021-02-03 ASSESSMENT — ENCOUNTER SYMPTOMS
FREQUENCY: 0
CHILLS: 0
MYALGIAS: 0
NAUSEA: 0
DIARRHEA: 0
HEMATURIA: 0
HEARTBURN: 0
SORE THROAT: 0
HEADACHES: 0
DYSURIA: 0
ARTHRALGIAS: 0
ABDOMINAL PAIN: 0
WEAKNESS: 0
NERVOUS/ANXIOUS: 0
JOINT SWELLING: 0
PALPITATIONS: 0
FEVER: 0
CONSTIPATION: 0
PARESTHESIAS: 0
DIZZINESS: 0
SHORTNESS OF BREATH: 0
COUGH: 0
EYE PAIN: 0
HEMATOCHEZIA: 0

## 2021-02-10 ENCOUNTER — OFFICE VISIT (OUTPATIENT)
Dept: FAMILY MEDICINE | Facility: CLINIC | Age: 61
End: 2021-02-10
Payer: COMMERCIAL

## 2021-02-10 VITALS
OXYGEN SATURATION: 96 % | DIASTOLIC BLOOD PRESSURE: 84 MMHG | HEART RATE: 80 BPM | WEIGHT: 190 LBS | HEIGHT: 70 IN | BODY MASS INDEX: 27.2 KG/M2 | RESPIRATION RATE: 16 BRPM | SYSTOLIC BLOOD PRESSURE: 136 MMHG | TEMPERATURE: 97.7 F

## 2021-02-10 DIAGNOSIS — R05.8 UPPER AIRWAY COUGH SYNDROME: ICD-10-CM

## 2021-02-10 DIAGNOSIS — R04.0 EPISTAXIS: ICD-10-CM

## 2021-02-10 DIAGNOSIS — L21.9 SEBORRHEIC DERMATITIS: ICD-10-CM

## 2021-02-10 DIAGNOSIS — Z13.6 CARDIOVASCULAR SCREENING; LDL GOAL LESS THAN 130: ICD-10-CM

## 2021-02-10 DIAGNOSIS — I10 ESSENTIAL HYPERTENSION: ICD-10-CM

## 2021-02-10 DIAGNOSIS — E78.5 HYPERLIPIDEMIA LDL GOAL <100: ICD-10-CM

## 2021-02-10 DIAGNOSIS — Z00.00 ROUTINE GENERAL MEDICAL EXAMINATION AT A HEALTH CARE FACILITY: Primary | ICD-10-CM

## 2021-02-10 LAB
ALT SERPL W P-5'-P-CCNC: 59 U/L (ref 0–70)
ANION GAP SERPL CALCULATED.3IONS-SCNC: 5 MMOL/L (ref 3–14)
BUN SERPL-MCNC: 19 MG/DL (ref 7–30)
CALCIUM SERPL-MCNC: 9.5 MG/DL (ref 8.5–10.1)
CHLORIDE SERPL-SCNC: 105 MMOL/L (ref 94–109)
CHOLEST SERPL-MCNC: 132 MG/DL
CO2 SERPL-SCNC: 29 MMOL/L (ref 20–32)
CREAT SERPL-MCNC: 1.15 MG/DL (ref 0.66–1.25)
CREAT UR-MCNC: 185 MG/DL
GFR SERPL CREATININE-BSD FRML MDRD: 70 ML/MIN/{1.73_M2}
GLUCOSE SERPL-MCNC: 91 MG/DL (ref 70–99)
HDLC SERPL-MCNC: 50 MG/DL
LDLC SERPL CALC-MCNC: 65 MG/DL
MICROALBUMIN UR-MCNC: 12 MG/L
MICROALBUMIN/CREAT UR: 6.76 MG/G CR (ref 0–17)
NONHDLC SERPL-MCNC: 82 MG/DL
POTASSIUM SERPL-SCNC: 3.7 MMOL/L (ref 3.4–5.3)
SODIUM SERPL-SCNC: 139 MMOL/L (ref 133–144)
TRIGL SERPL-MCNC: 84 MG/DL

## 2021-02-10 PROCEDURE — 80061 LIPID PANEL: CPT | Performed by: INTERNAL MEDICINE

## 2021-02-10 PROCEDURE — 36415 COLL VENOUS BLD VENIPUNCTURE: CPT | Performed by: INTERNAL MEDICINE

## 2021-02-10 PROCEDURE — 80048 BASIC METABOLIC PNL TOTAL CA: CPT | Performed by: INTERNAL MEDICINE

## 2021-02-10 PROCEDURE — 82043 UR ALBUMIN QUANTITATIVE: CPT | Performed by: INTERNAL MEDICINE

## 2021-02-10 PROCEDURE — 99396 PREV VISIT EST AGE 40-64: CPT | Performed by: INTERNAL MEDICINE

## 2021-02-10 PROCEDURE — 84460 ALANINE AMINO (ALT) (SGPT): CPT | Performed by: INTERNAL MEDICINE

## 2021-02-10 RX ORDER — ATORVASTATIN CALCIUM 10 MG/1
10 TABLET, FILM COATED ORAL DAILY
Qty: 90 TABLET | Refills: 3 | Status: SHIPPED | OUTPATIENT
Start: 2021-02-10 | End: 2022-01-10

## 2021-02-10 RX ORDER — MONTELUKAST SODIUM 10 MG/1
10 TABLET ORAL AT BEDTIME
Qty: 90 TABLET | Refills: 3 | Status: SHIPPED | OUTPATIENT
Start: 2021-02-10 | End: 2021-05-20

## 2021-02-10 RX ORDER — PRAZOSIN HYDROCHLORIDE 2 MG/1
2 CAPSULE ORAL AT BEDTIME
Qty: 90 CAPSULE | Refills: 3 | Status: SHIPPED | OUTPATIENT
Start: 2021-02-10 | End: 2021-09-17 | Stop reason: SINTOL

## 2021-02-10 RX ORDER — KETOCONAZOLE 20 MG/G
CREAM TOPICAL DAILY
Qty: 30 G | Refills: 11 | Status: SHIPPED | OUTPATIENT
Start: 2021-02-10 | End: 2023-08-11

## 2021-02-10 RX ORDER — OXYMETAZOLINE HYDROCHLORIDE 0.05 G/100ML
1 SPRAY NASAL 2 TIMES DAILY
Qty: 15 ML | Refills: 0 | Status: SHIPPED | OUTPATIENT
Start: 2021-02-10 | End: 2021-02-15

## 2021-02-10 RX ORDER — METOPROLOL SUCCINATE 25 MG/1
25 TABLET, EXTENDED RELEASE ORAL DAILY
Qty: 90 TABLET | Refills: 3 | Status: SHIPPED | OUTPATIENT
Start: 2021-02-10 | End: 2021-09-17

## 2021-02-10 ASSESSMENT — ENCOUNTER SYMPTOMS
FEVER: 0
CHILLS: 0
HEMATURIA: 0
DIARRHEA: 0
JOINT SWELLING: 0
COUGH: 0
ARTHRALGIAS: 0
DIZZINESS: 0
NERVOUS/ANXIOUS: 0
HEARTBURN: 0
FREQUENCY: 0
PALPITATIONS: 0
SHORTNESS OF BREATH: 0
WEAKNESS: 0
CONSTIPATION: 0
EYE PAIN: 0
DYSURIA: 0
MYALGIAS: 0
SORE THROAT: 0
HEADACHES: 0
HEMATOCHEZIA: 0
ABDOMINAL PAIN: 0
NAUSEA: 0
PARESTHESIAS: 0

## 2021-02-10 ASSESSMENT — MIFFLIN-ST. JEOR: SCORE: 1692.05

## 2021-02-10 NOTE — PATIENT INSTRUCTIONS
At [facility], we strive to deliver an exceptional experience to you, every time we see you. If you receive a survey, please complete it as we do value your feedback.  If you have MyChart, you can expect to receive results automatically within 24 hours of their completion.  Your provider will send a note interpreting your results as well.   If you do not have MyChart, you should receive your results in about a week by mail.    Your care team:                            Family Medicine Internal Medicine   MD Berry Gutiérrez MD Shantel Branch-Fleming, MD Srinivasa Vaka, MD Katya Belousova, PAManasaC  Kaylen Hodge, APRN CNP    Kai Fallon, MD Pediatrics   Guanakito Nunez, PAKAYLEN Pete, CNP MD Yamile Hernandez APRN CNP   MD Flori Corey MD Deborah Mielke, MD Lety Peacock, APRN [facility]      Clinic hours: Monday - Thursday 7 am-6 pm; Fridays 7 am-5 pm.   Urgent care: Monday - Friday 11 am-9 pm; Saturday and Sunday 9 am-5 pm.    Clinic: [phone]       [facility]: Monday - Thursday 8 am - 7 pm; Friday 8 am - 6 pm  [facility]: [phone]     Use [email or web address] for 24/7 diagnosis and treatment of dozens of conditions.    Preventive Health Recommendations  Male Ages 50 - 64    Yearly exam:             See your health care provider every year in order to  o   Review health changes.   o   Discuss preventive care.    o   Review your medicines if your doctor has prescribed any.     Have a cholesterol test every 5 years, or more frequently if you are at risk for high cholesterol/heart disease.     Have a diabetes test (fasting glucose) every three years. If you are at risk for diabetes, you should have this test more often.     Have a colonoscopy at age 50, or have a yearly FIT test (stool test). These exams will check for colon cancer.      Talk with your health care provider  about whether or not a prostate cancer screening test (PSA) is right for you.    You should be tested each year for STDs (sexually transmitted diseases), if you re at risk.     Shots: Get a flu shot each year. Get a tetanus shot every 10 years.     Nutrition:    Eat at least 5 servings of fruits and vegetables daily.     Eat whole-grain bread, whole-wheat pasta and brown rice instead of white grains and rice.     Get adequate Calcium and Vitamin D.     Lifestyle    Exercise for at least 150 minutes a week (30 minutes a day, 5 days a week). This will help you control your weight and prevent disease.     Limit alcohol to one drink per day.     No smoking.     Wear sunscreen to prevent skin cancer.     See your dentist every six months for an exam and cleaning.     See your eye doctor every 1 to 2 years.

## 2021-02-10 NOTE — PROGRESS NOTES
SUBJECTIVE:   CC: Ruddy Aaron is an 60 year old male who presents for preventative health visit.     Patient has been advised of split billing requirements and indicates understanding: Yes  Healthy Habits:     Getting at least 3 servings of Calcium per day:  Yes    Bi-annual eye exam:  NO    Dental care twice a year:  Yes    Sleep apnea or symptoms of sleep apnea:  None    Diet:  Regular (no restrictions)    Frequency of exercise:  1 day/week    Duration of exercise:  N/A    Taking medications regularly:  Yes    Medication side effects:  None    PHQ-2 Total Score: 0    Additional concerns today:  Yes    Cough when speaking.  Tried Claritin and switched BP meds.  While taking shower,         Today's PHQ-2 Score:   PHQ-2 ( 1999 Pfizer) 2/3/2021   Q1: Little interest or pleasure in doing things 0   Q2: Feeling down, depressed or hopeless 0   PHQ-2 Score 0   Q1: Little interest or pleasure in doing things Not at all   Q2: Feeling down, depressed or hopeless Not at all   PHQ-2 Score 0       Abuse: Current or Past(Physical, Sexual or Emotional)- No  Do you feel safe in your environment? Yes    Have you ever done Advance Care Planning? (For example, a Health Directive, POLST, or a discussion with a medical provider or your loved ones about your wishes): No, advance care planning information given to patient to review.  Patient plans to discuss their wishes with loved ones or provider.      Social History     Tobacco Use     Smoking status: Never Smoker     Smokeless tobacco: Never Used   Substance Use Topics     Alcohol use: Yes     Alcohol/week: 1.0 standard drinks     Types: 1 Standard drinks or equivalent per week     Comment: ocassional     If you drink alcohol do you typically have >3 drinks per day or >7 drinks per week? No    Alcohol Use 2/3/2021   Prescreen: >3 drinks/day or >7 drinks/week? No   Prescreen: >3 drinks/day or >7 drinks/week? -   No flowsheet data found.    Last PSA:   PSA   Date Value Ref Range  Status   06/23/2015 1.10 0 - 4 ug/L Final       Reviewed orders with patient. Reviewed health maintenance and updated orders accordingly - Yes  Labs reviewed in EPIC  BP Readings from Last 3 Encounters:   02/10/21 136/84   08/26/20 (!) 144/90   03/11/19 124/82    Wt Readings from Last 3 Encounters:   02/10/21 86.2 kg (190 lb)   08/26/20 88.9 kg (196 lb)   03/11/19 87.5 kg (193 lb)                  Patient Active Problem List   Diagnosis     CARDIOVASCULAR SCREENING; LDL GOAL LESS THAN 130     Headaches, tension-type     Chronic eczema     Essential hypertension     Seasonal allergic rhinitis     Overweight (BMI 25.0-29.9)     Neck pain     Nonallergic rhinitis     Combined forms of age-related cataract, mild, of both eyes     Seborrheic dermatitis     Hyperlipidemia LDL goal <100     Upper airway cough syndrome     Past Surgical History:   Procedure Laterality Date     VASECTOMY         Social History     Tobacco Use     Smoking status: Never Smoker     Smokeless tobacco: Never Used   Substance Use Topics     Alcohol use: Yes     Alcohol/week: 1.0 standard drinks     Types: 1 Standard drinks or equivalent per week     Comment: ocassional     Family History   Problem Relation Age of Onset     Hypertension Father      Eye Disorder Mother      Eye Disorder Other         mother's side of family has cataracts, and glaucoma?     Asthma No family hx of      C.A.D. No family hx of      Diabetes No family hx of      Cerebrovascular Disease No family hx of      Breast Cancer No family hx of      Cancer - colorectal No family hx of      Prostate Cancer No family hx of          Allergies   Allergen Reactions     Lisinopril Cough     Recent Labs   Lab Test 02/10/21  1018 08/26/20  1052 07/06/20  1220 03/01/18  0809 03/01/18  0809 07/01/16  0930   A1C  --   --  5.3  --   --   --    LDL 65 124*  --   --   --  113*   HDL 50 56  --   --   --  49   TRIG 84 99  --   --   --  77   ALT 59 67  --   --  36  --    CR 1.15  --  1.07   < >  1.08 1.12   GFRESTIMATED 70  --  76   < > 71 68   GFRESTBLACK 81  --  88   < > 86 83   POTASSIUM 3.7  --  4.0   < > 3.8 4.0    < > = values in this interval not displayed.        Reviewed and updated as needed this visit by clinical staff  Tobacco  Allergies  Meds   Med Hx  Surg Hx  Fam Hx  Soc Hx        Reviewed and updated as needed this visit by Provider                    Review of Systems   Constitutional: Negative for chills and fever.   HENT: Negative for congestion, ear pain, hearing loss and sore throat.    Eyes: Negative for pain and visual disturbance.   Respiratory: Negative for cough and shortness of breath.    Cardiovascular: Negative for chest pain, palpitations and peripheral edema.   Gastrointestinal: Negative for abdominal pain, constipation, diarrhea, heartburn, hematochezia and nausea.   Genitourinary: Negative for discharge, dysuria, frequency, genital sores, hematuria, impotence and urgency.   Musculoskeletal: Negative for arthralgias, joint swelling and myalgias.   Skin: Negative for rash.   Neurological: Negative for dizziness, weakness, headaches and paresthesias.   Psychiatric/Behavioral: Negative for mood changes. The patient is not nervous/anxious.      CONSTITUTIONAL: NEGATIVE for fever, chills, change in weight  INTEGUMENTARY/SKIN: NEGATIVE for worrisome rashes, moles or lesions  EYES: NEGATIVE for vision changes or irritation  ENT: NEGATIVE for ear, mouth and throat problems  RESP: NEGATIVE for significant cough or SOB  CV: NEGATIVE for chest pain, palpitations or peripheral edema  GI: NEGATIVE for nausea, abdominal pain, heartburn, or change in bowel habits   male: negative for dysuria, hematuria, decreased urinary stream, erectile dysfunction, urethral discharge  MUSCULOSKELETAL: NEGATIVE for significant arthralgias or myalgia  NEURO: NEGATIVE for weakness, dizziness or paresthesias  PSYCHIATRIC: NEGATIVE for changes in mood or affect    OBJECTIVE:   /84 (BP Location:  "Left arm, Patient Position: Sitting, Cuff Size: Adult Large)   Pulse 80   Temp 97.7  F (36.5  C) (Tympanic)   Resp 16   Ht 1.784 m (5' 10.25\")   Wt 86.2 kg (190 lb)   SpO2 96%   BMI 27.07 kg/m      Physical Exam  GENERAL: healthy, alert and no distress  EYES: Eyes grossly normal to inspection, PERRL and conjunctivae and sclerae normal  HENT: ear canals and TM's normal, nose and mouth without ulcers or lesions  NECK: no adenopathy, no asymmetry, masses, or scars and thyroid normal to palpation  RESP: lungs clear to auscultation - no rales, rhonchi or wheezes  CV: regular rate and rhythm, normal S1 S2, no S3 or S4, no murmur, click or rub, no peripheral edema and peripheral pulses strong  ABDOMEN: soft, nontender, no hepatosplenomegaly, no masses and bowel sounds normal  MS: no gross musculoskeletal defects noted, no edema  SKIN: no suspicious lesions or rashes  NEURO: Normal strength and tone, mentation intact and speech normal  PSYCH: mentation appears normal, affect normal/bright    Diagnostic Test Results:  Results for orders placed or performed in visit on 02/10/21   Lipid panel reflex to direct LDL Fasting     Status: None   Result Value Ref Range    Cholesterol 132 <200 mg/dL    Triglycerides 84 <150 mg/dL    HDL Cholesterol 50 >39 mg/dL    LDL Cholesterol Calculated 65 <100 mg/dL    Non HDL Cholesterol 82 <130 mg/dL   ALT     Status: None   Result Value Ref Range    ALT 59 0 - 70 U/L   Basic metabolic panel  (Ca, Cl, CO2, Creat, Gluc, K, Na, BUN)     Status: None   Result Value Ref Range    Sodium 139 133 - 144 mmol/L    Potassium 3.7 3.4 - 5.3 mmol/L    Chloride 105 94 - 109 mmol/L    Carbon Dioxide 29 20 - 32 mmol/L    Anion Gap 5 3 - 14 mmol/L    Glucose 91 70 - 99 mg/dL    Urea Nitrogen 19 7 - 30 mg/dL    Creatinine 1.15 0.66 - 1.25 mg/dL    GFR Estimate 70 >60 mL/min/[1.73_m2]    GFR Estimate If Black 81 >60 mL/min/[1.73_m2]    Calcium 9.5 8.5 - 10.1 mg/dL   Albumin Random Urine Quantitative with " Creat Ratio     Status: None   Result Value Ref Range    Creatinine Urine 185 mg/dL    Albumin Urine mg/L 12 mg/L    Albumin Urine mg/g Cr 6.76 0 - 17 mg/g Cr       ASSESSMENT/PLAN:   1. Routine general medical examination at a health care facility    - Lipid panel reflex to direct LDL Fasting  - ALT  - Basic metabolic panel  (Ca, Cl, CO2, Creat, Gluc, K, Na, BUN)    2. Hyperlipidemia LDL goal <100    - atorvastatin (LIPITOR) 10 MG tablet; Take 1 tablet (10 mg) by mouth daily  Dispense: 90 tablet; Refill: 3  - Lipid panel reflex to direct LDL Fasting    3. Essential hypertension    - prazosin (MINIPRESS) 2 MG capsule; Take 1 capsule (2 mg) by mouth At Bedtime  Dispense: 90 capsule; Refill: 3  - metoprolol succinate ER (TOPROL-XL) 25 MG 24 hr tablet; Take 1 tablet (25 mg) by mouth daily  Dispense: 90 tablet; Refill: 3  - Basic metabolic panel  (Ca, Cl, CO2, Creat, Gluc, K, Na, BUN)  - Albumin Random Urine Quantitative with Creat Ratio    4. Upper airway cough syndrome    - montelukast (SINGULAIR) 10 MG tablet; Take 1 tablet (10 mg) by mouth At Bedtime  Dispense: 90 tablet; Refill: 3    5. Epistaxis    - oxymetazoline (AFRIN) 0.05 % nasal spray; Spray 1 spray into both nostrils 2 times daily for 5 days  Dispense: 15 mL; Refill: 0    6. Seborrheic dermatitis    - ketoconazole (NIZORAL) 2 % external cream; Apply topically daily  Dispense: 30 g; Refill: 11    Patient has been advised of split billing requirements and indicates understanding: Yes  COUNSELING:   Special attention given to:        Regular exercise       Healthy diet/nutrition       The 10-year ASCVD risk score (Delanceyisabelle NAIDU Jr., et al., 2013) is: 6.2%    Values used to calculate the score:      Age: 60 years      Sex: Male      Is Non- : No      Diabetic: No      Tobacco smoker: No      Systolic Blood Pressure: 136 mmHg      Is BP treated: Yes      HDL Cholesterol: 50 mg/dL      Total Cholesterol: 132 mg/dL    Estimated body mass index is  "27.07 kg/m  as calculated from the following:    Height as of this encounter: 1.784 m (5' 10.25\").    Weight as of this encounter: 86.2 kg (190 lb).     Weight management plan: diet and exercise.    He reports that he has never smoked. He has never used smokeless tobacco.      Counseling Resources:  ATP IV Guidelines  Pooled Cohorts Equation Calculator  FRAX Risk Assessment  ICSI Preventive Guidelines  Dietary Guidelines for Americans, 2010  USDA's MyPlate  ASA Prophylaxis  Lung CA Screening    Berry Lazo MD  [facility]  "

## 2021-03-22 ENCOUNTER — MYC MEDICAL ADVICE (OUTPATIENT)
Dept: FAMILY MEDICINE | Facility: CLINIC | Age: 61
End: 2021-03-22

## 2021-03-22 DIAGNOSIS — R05.8 UPPER AIRWAY COUGH SYNDROME: Primary | ICD-10-CM

## 2021-03-22 NOTE — TELEPHONE ENCOUNTER
I spoke with patient and I reassured him that there are no absolute contraindications to receiving COVID-19 vaccine.

## 2021-03-29 NOTE — PROGRESS NOTES
I am seeing this patient in consultation for upper airway cough syndrome at the request of the provider Dr. Berry Lazo.    Chief Complaint - chronic cough    History of Present Illness - Ruddy Aaron is a 60 year old male who presents with a cough for approximately 6 months. They describe the cough as nonproductive. He feels it in his throat. It only happens when he talks or goes into cold air. Liquids have been more difficult. no troubles swallowing solids. Voicing has seemed normal. No pain. They note only rarely relux. The patient has tried singulair. They don't take lisinopril or other ACEI currently. He was on lisinopril, stopped it and cough went away, but then came back 3 months later. CXR not done. They deny breathing problems. None smoker. I personally reviewed the relevant clinical notes in Epic including the primary care providers note.     Past Medical History -   Patient Active Problem List   Diagnosis     CARDIOVASCULAR SCREENING; LDL GOAL LESS THAN 130     Headaches, tension-type     Chronic eczema     Essential hypertension     Seasonal allergic rhinitis     Overweight (BMI 25.0-29.9)     Neck pain     Nonallergic rhinitis     Combined forms of age-related cataract, mild, of both eyes     Seborrheic dermatitis     Hyperlipidemia LDL goal <100     Upper airway cough syndrome       Current Medications -   Current Outpatient Medications:      ASPIRIN 81 PO, Take 1 tablet by mouth daily, Disp: , Rfl:      atorvastatin (LIPITOR) 10 MG tablet, Take 1 tablet (10 mg) by mouth daily, Disp: 90 tablet, Rfl: 3     hydrocortisone (WESTCORT) 0.2 % external cream, Apply topically 2 times daily Apply to affected areas., Disp: 15 g, Rfl: 11     ketoconazole (NIZORAL) 2 % external cream, Apply topically daily, Disp: 30 g, Rfl: 11     metoprolol succinate ER (TOPROL-XL) 25 MG 24 hr tablet, Take 1 tablet (25 mg) by mouth daily, Disp: 90 tablet, Rfl: 3     montelukast (SINGULAIR) 10 MG tablet, Take 1 tablet (10 mg) by  mouth At Bedtime, Disp: 90 tablet, Rfl: 3     prazosin (MINIPRESS) 2 MG capsule, Take 1 capsule (2 mg) by mouth At Bedtime, Disp: 90 capsule, Rfl: 3    Allergies -   Allergies   Allergen Reactions     Lisinopril Cough       Social History -   Social History     Socioeconomic History     Marital status:      Spouse name: Not on file     Number of children: 3     Years of education: Not on file     Highest education level: Not on file   Occupational History     Employer: [employer]   Social Needs     Financial resource strain: Not on file     Food insecurity     Worry: Not on file     Inability: Not on file     Transportation needs     Medical: Not on file     Non-medical: Not on file   Tobacco Use     Smoking status: Never Smoker     Smokeless tobacco: Never Used   Substance and Sexual Activity     Alcohol use: Yes     Alcohol/week: 1.0 standard drinks     Types: 1 Standard drinks or equivalent per week     Comment: ocassional     Drug use: No     Sexual activity: Yes     Partners: Female   Lifestyle     Physical activity     Days per week: Not on file     Minutes per session: Not on file     Stress: Not on file   Relationships     Social connections     Talks on phone: Not on file     Gets together: Not on file     Attends Mandaeism service: Not on file     Active member of club or organization: Not on file     Attends meetings of clubs or organizations: Not on file     Relationship status: Not on file     Intimate partner violence     Fear of current or ex partner: Not on file     Emotionally abused: Not on file     Physically abused: Not on file     Forced sexual activity: Not on file   Other Topics Concern     Parent/sibling w/ CABG, MI or angioplasty before 65F 55M? No   Social History Narrative     Not on file       Family History -   Family History   Problem Relation Age of Onset     Hypertension Father      Eye Disorder Mother      Eye Disorder Other         mother's side of family has  "cataracts, and glaucoma?     Asthma No family hx of      C.A.D. No family hx of      Diabetes No family hx of      Cerebrovascular Disease No family hx of      Breast Cancer No family hx of      Cancer - colorectal No family hx of      Prostate Cancer No family hx of        Review of Systems - As per HPI and PMHx, otherwise 10+ comprehensive system review is negative.    Physical Exam  BP (!) 132/92   Pulse 76   Resp 16   Ht 1.803 m (5' 11\")   Wt 90.3 kg (199 lb)   SpO2 98%   BMI 27.75 kg/m    General - The patient is in no distress. Alert and oriented to person and place, answers questions and cooperates with examination appropriately.   Voice and Breathing - The patient was breathing comfortably without the use of accessory muscles. There was no wheezing, stridor, or stertor.  The patients voice was clear and strong, and had appropriate pitch and quality. Some coughing, worse with talking.  Neuro - CN2-12 intact. HB 1 out of 6.   Eyes - Extraocular movements intact.  Sclera were not icteric or injected, conjunctiva were pink and moist.  Mouth - Examination of the oral cavity showed pink, healthy oral mucosa. No lesions or ulcerations noted.  The tongue was mobile and midline.  Throat - The walls of the oropharynx were smooth, symmetric, and had no lesions or ulcerations.  The uvula was midline on elevation. Tonsils were 1 + and quiet. No masses.  Nose - External contour is symmetric, no gross deflection or scars. Has a right anterior and superior septal telangiectasia or broken vessel. It bleeds with palpation. The septum was to the right some, turbinates of normal size and position.  No polyps, masses, or purulence noted on examination.  Neck -  Soft, nontender. Palpation of the occipital, submental, submandibular, internal jugular chain, and supraclavicular nodes did not demonstrate any abnormal lymph nodes or masses. Parotid glands had no masses. Palpation of the thyroid was soft and smooth, with no " nodules or goiter appreciated.  The trachea was mobile and midline.  Cardiovascular - carotid pulses are 2+ bilaterally, regular rhythm      Procedure: Flexible Endoscopy  Indication: Chronic cough    To best visualize the upper airway anatomy and due to the chief complaint and HPI, I proceeded with a fiberoptic examination.  First I sprayed both sides of the nose with a mixture of lidocaine and neosynephrine.  I then passed the scope through the left nasal cavity. The nasal cavity was unremarkable. No pus. No significant postnasal drainage. The nasopharynx was mucosally covered and symmetric. The Eustachian tube openings were unobstructed. Going further down I had a clear view of the base of tongue which had normal appearing lingual tonsillar tissue.  The base of tongue was free of lesions, no masses, and the vallecula was open.  The epiglottis was smooth and mucosally covered.  The supraglottic larynx was then clearly visualized. It was normal. No excess supragalottic edema or erythema. The vocal cords moved smoothly and symmetrically, they were pearly white and no lesions were seen.  The pyriform sinuses were open, and the limited view of the postcricoid region did not show any lesions.    Nasal Cautery - Options were explained to the patient regarding conservative measures versus nasal cautery in the clinic today.  The patient wished to proceed with cautery.  I sprayed the right nasal cavity with lidocaine and phenylephrine. I then applied 2 sticks of silver nitrate to the vessels, of the right anterior nasal mucosa.  The patient tolerated the procedure well.    A/P - Dev JUAN Aaron is a 60 year old male with chronic cough but no evidence of infection, inflammation, or neoplasm on exam to explain the symptoms. He feels the cough triggered in his throat and its only with speaking and sometimes drinking liquids.  He had normal vocal cord appearance and function on today's laryngoscopy.  He did not have significant  postnasal drainage, denies this as well as acid reflux.  He has no other lung symptoms.  He also does not have allergy symptoms.  I think the most likely etiology is irritable larynx syndrome and hyperactive cough reflex.  I recommend voice therapy.  If this fails I would consider imaging the neck and chest to make sure were not missing any pathology that could be creating the cough. Return in 2-3 months if not better.    He has epistaxis from the right septal telangiectasia with a broken blood vessel.  I cauterized this today.  He should use Vaseline a few times a day over the next 1 to 2 weeks.  If it continues to bleed he should return to see me.    Dr. Randy Pope  Otolaryngology  [facility]

## 2021-03-30 ENCOUNTER — OFFICE VISIT (OUTPATIENT)
Dept: OTOLARYNGOLOGY | Facility: CLINIC | Age: 61
End: 2021-03-30
Payer: COMMERCIAL

## 2021-03-30 VITALS
RESPIRATION RATE: 16 BRPM | HEIGHT: 71 IN | HEART RATE: 76 BPM | DIASTOLIC BLOOD PRESSURE: 92 MMHG | WEIGHT: 199 LBS | SYSTOLIC BLOOD PRESSURE: 132 MMHG | BODY MASS INDEX: 27.86 KG/M2 | OXYGEN SATURATION: 98 %

## 2021-03-30 DIAGNOSIS — J38.7 IRRITABLE LARYNX SYNDROME: ICD-10-CM

## 2021-03-30 DIAGNOSIS — R04.0 EPISTAXIS: ICD-10-CM

## 2021-03-30 DIAGNOSIS — R05.8 UPPER AIRWAY COUGH SYNDROME: Primary | ICD-10-CM

## 2021-03-30 PROCEDURE — 30901 CONTROL OF NOSEBLEED: CPT | Performed by: OTOLARYNGOLOGY

## 2021-03-30 PROCEDURE — 31575 DIAGNOSTIC LARYNGOSCOPY: CPT | Performed by: OTOLARYNGOLOGY

## 2021-03-30 PROCEDURE — 99203 OFFICE O/P NEW LOW 30 MIN: CPT | Mod: 25 | Performed by: OTOLARYNGOLOGY

## 2021-03-30 ASSESSMENT — MIFFLIN-ST. JEOR: SCORE: 1744.79

## 2021-03-30 NOTE — LETTER
3/30/2021         RE: Ruddy Aaron  [address]  [place] [address]        Dear Colleague,    Thank you for referring your patient, Ruddy Aaron, to the [facility]. Please see a copy of my visit note below.    I am seeing this patient in consultation for upper airway cough syndrome at the request of the provider Dr. Berry Lazo.    Chief Complaint - chronic cough    History of Present Illness - Ruddy Aaron is a 60 year old male who presents with a cough for approximately 6 months. They describe the cough as nonproductive. He feels it in his throat. It only happens when he talks or goes into cold air. Liquids have been more difficult. no troubles swallowing solids. Voicing has seemed normal. No pain. They note only rarely relux. The patient has tried singulair. They don't take lisinopril or other ACEI currently. He was on lisinopril, stopped it and cough went away, but then came back 3 months later. CXR not done. They deny breathing problems. None smoker. I personally reviewed the relevant clinical notes in Epic including the primary care providers note.     Past Medical History -   Patient Active Problem List   Diagnosis     CARDIOVASCULAR SCREENING; LDL GOAL LESS THAN 130     Headaches, tension-type     Chronic eczema     Essential hypertension     Seasonal allergic rhinitis     Overweight (BMI 25.0-29.9)     Neck pain     Nonallergic rhinitis     Combined forms of age-related cataract, mild, of both eyes     Seborrheic dermatitis     Hyperlipidemia LDL goal <100     Upper airway cough syndrome       Current Medications -   Current Outpatient Medications:      ASPIRIN 81 PO, Take 1 tablet by mouth daily, Disp: , Rfl:      atorvastatin (LIPITOR) 10 MG tablet, Take 1 tablet (10 mg) by mouth daily, Disp: 90 tablet, Rfl: 3     hydrocortisone (WESTCORT) 0.2 % external cream, Apply topically 2 times daily Apply to affected areas., Disp: 15 g, Rfl: 11     ketoconazole  (NIZORAL) 2 % external cream, Apply topically daily, Disp: 30 g, Rfl: 11     metoprolol succinate ER (TOPROL-XL) 25 MG 24 hr tablet, Take 1 tablet (25 mg) by mouth daily, Disp: 90 tablet, Rfl: 3     montelukast (SINGULAIR) 10 MG tablet, Take 1 tablet (10 mg) by mouth At Bedtime, Disp: 90 tablet, Rfl: 3     prazosin (MINIPRESS) 2 MG capsule, Take 1 capsule (2 mg) by mouth At Bedtime, Disp: 90 capsule, Rfl: 3    Allergies -   Allergies   Allergen Reactions     Lisinopril Cough       Social History -   Social History     Socioeconomic History     Marital status:      Spouse name: Not on file     Number of children: 3     Years of education: Not on file     Highest education level: Not on file   Occupational History     Employer: [employer]   Social Needs     Financial resource strain: Not on file     Food insecurity     Worry: Not on file     Inability: Not on file     Transportation needs     Medical: Not on file     Non-medical: Not on file   Tobacco Use     Smoking status: Never Smoker     Smokeless tobacco: Never Used   Substance and Sexual Activity     Alcohol use: Yes     Alcohol/week: 1.0 standard drinks     Types: 1 Standard drinks or equivalent per week     Comment: ocassional     Drug use: No     Sexual activity: Yes     Partners: Female   Lifestyle     Physical activity     Days per week: Not on file     Minutes per session: Not on file     Stress: Not on file   Relationships     Social connections     Talks on phone: Not on file     Gets together: Not on file     Attends Denominational service: Not on file     Active member of club or organization: Not on file     Attends meetings of clubs or organizations: Not on file     Relationship status: Not on file     Intimate partner violence     Fear of current or ex partner: Not on file     Emotionally abused: Not on file     Physically abused: Not on file     Forced sexual activity: Not on file   Other Topics Concern     Parent/sibling w/ CABG, MI  "or angioplasty before 65F 55M? No   Social History Narrative     Not on file       Family History -   Family History   Problem Relation Age of Onset     Hypertension Father      Eye Disorder Mother      Eye Disorder Other         mother's side of family has cataracts, and glaucoma?     Asthma No family hx of      C.A.D. No family hx of      Diabetes No family hx of      Cerebrovascular Disease No family hx of      Breast Cancer No family hx of      Cancer - colorectal No family hx of      Prostate Cancer No family hx of        Review of Systems - As per HPI and PMHx, otherwise 10+ comprehensive system review is negative.    Physical Exam  BP (!) 132/92   Pulse 76   Resp 16   Ht 1.803 m (5' 11\")   Wt 90.3 kg (199 lb)   SpO2 98%   BMI 27.75 kg/m    General - The patient is in no distress. Alert and oriented to person and place, answers questions and cooperates with examination appropriately.   Voice and Breathing - The patient was breathing comfortably without the use of accessory muscles. There was no wheezing, stridor, or stertor.  The patients voice was clear and strong, and had appropriate pitch and quality. Some coughing, worse with talking.  Neuro - CN2-12 intact. HB 1 out of 6.   Eyes - Extraocular movements intact.  Sclera were not icteric or injected, conjunctiva were pink and moist.  Mouth - Examination of the oral cavity showed pink, healthy oral mucosa. No lesions or ulcerations noted.  The tongue was mobile and midline.  Throat - The walls of the oropharynx were smooth, symmetric, and had no lesions or ulcerations.  The uvula was midline on elevation. Tonsils were 1 + and quiet. No masses.  Nose - External contour is symmetric, no gross deflection or scars. Has a right anterior and superior septal telangiectasia or broken vessel. It bleeds with palpation. The septum was to the right some, turbinates of normal size and position.  No polyps, masses, or purulence noted on examination.  Neck -  Soft, " nontender. Palpation of the occipital, submental, submandibular, internal jugular chain, and supraclavicular nodes did not demonstrate any abnormal lymph nodes or masses. Parotid glands had no masses. Palpation of the thyroid was soft and smooth, with no nodules or goiter appreciated.  The trachea was mobile and midline.  Cardiovascular - carotid pulses are 2+ bilaterally, regular rhythm      Procedure: Flexible Endoscopy  Indication: Chronic cough    To best visualize the upper airway anatomy and due to the chief complaint and HPI, I proceeded with a fiberoptic examination.  First I sprayed both sides of the nose with a mixture of lidocaine and neosynephrine.  I then passed the scope through the left nasal cavity. The nasal cavity was unremarkable. No pus. No significant postnasal drainage. The nasopharynx was mucosally covered and symmetric. The Eustachian tube openings were unobstructed. Going further down I had a clear view of the base of tongue which had normal appearing lingual tonsillar tissue.  The base of tongue was free of lesions, no masses, and the vallecula was open.  The epiglottis was smooth and mucosally covered.  The supraglottic larynx was then clearly visualized. It was normal. No excess supragalottic edema or erythema. The vocal cords moved smoothly and symmetrically, they were pearly white and no lesions were seen.  The pyriform sinuses were open, and the limited view of the postcricoid region did not show any lesions.    Nasal Cautery - Options were explained to the patient regarding conservative measures versus nasal cautery in the clinic today.  The patient wished to proceed with cautery.  I sprayed the right nasal cavity with lidocaine and phenylephrine. I then applied 2 sticks of silver nitrate to the vessels, of the right anterior nasal mucosa.  The patient tolerated the procedure well.    A/P - Dev JUAN Aaron is a 60 year old male with chronic cough but no evidence of infection,  inflammation, or neoplasm on exam to explain the symptoms. He feels the cough triggered in his throat and its only with speaking and sometimes drinking liquids.  He had normal vocal cord appearance and function on today's laryngoscopy.  He did not have significant postnasal drainage, denies this as well as acid reflux.  He has no other lung symptoms.  He also does not have allergy symptoms.  I think the most likely etiology is irritable larynx syndrome and hyperactive cough reflex.  I recommend voice therapy.  If this fails I would consider imaging the neck and chest to make sure were not missing any pathology that could be creating the cough. Return in 2-3 months if not better.    He has epistaxis from the right septal telangiectasia with a broken blood vessel.  I cauterized this today.  He should use Vaseline a few times a day over the next 1 to 2 weeks.  If it continues to bleed he should return to see me.    Dr. Randy Pope  Otolaryngology  [facility]        Again, thank you for allowing me to participate in the care of your patient.        Sincerely,        Randy Pope MD

## 2021-04-14 ENCOUNTER — OFFICE VISIT (OUTPATIENT)
Dept: OTOLARYNGOLOGY | Facility: CLINIC | Age: 61
End: 2021-04-14
Payer: COMMERCIAL

## 2021-04-14 VITALS
RESPIRATION RATE: 16 BRPM | HEART RATE: 75 BPM | OXYGEN SATURATION: 97 % | DIASTOLIC BLOOD PRESSURE: 92 MMHG | SYSTOLIC BLOOD PRESSURE: 137 MMHG

## 2021-04-14 DIAGNOSIS — R04.0 EPISTAXIS: Primary | ICD-10-CM

## 2021-04-14 DIAGNOSIS — K21.9 LPRD (LARYNGOPHARYNGEAL REFLUX DISEASE): ICD-10-CM

## 2021-04-14 DIAGNOSIS — J38.7 IRRITABLE LARYNX SYNDROME: ICD-10-CM

## 2021-04-14 DIAGNOSIS — R05.3 CHRONIC COUGH: ICD-10-CM

## 2021-04-14 DIAGNOSIS — R09.82 PND (POST-NASAL DRIP): ICD-10-CM

## 2021-04-14 PROCEDURE — 99214 OFFICE O/P EST MOD 30 MIN: CPT | Performed by: OTOLARYNGOLOGY

## 2021-04-14 NOTE — PROGRESS NOTES
Chief Complaint - chronic cough    History of Present Illness - Ruddy Aaron is a 60 year old male who returns with a cough for approximately 6 months. They describe the cough as nonproductive. He feels it in his throat. It only happens when he talks or goes into cold air. Liquids have been more difficult. no troubles swallowing solids. Voicing has seemed normal. No pain. They note only rarely relux. The patient has tried singulair. They don't take lisinopril or other ACEI currently. He was on lisinopril, stopped it and cough went away, but then came back 3 months later. A trip to the [facility] helped. CXR not done. They deny breathing problems. None smoker. I saw him 2 weeks ago.  I think the most likely etiology is irritable larynx syndrome and hyperactive cough reflex.  I recommend voice therapy. He returns and notes he was in the [place] for 1 week and cough went away. However, it came back. He thinks cold air makes it worse.     He also had epistaxis. I cauterized his right septum. Unfortunately he started having bleeding again.     Past Medical History -   Patient Active Problem List   Diagnosis     CARDIOVASCULAR SCREENING; LDL GOAL LESS THAN 130     Headaches, tension-type     Chronic eczema     Essential hypertension     Seasonal allergic rhinitis     Overweight (BMI 25.0-29.9)     Neck pain     Nonallergic rhinitis     Combined forms of age-related cataract, mild, of both eyes     Seborrheic dermatitis     Hyperlipidemia LDL goal <100     Upper airway cough syndrome       Current Medications -   Current Outpatient Medications:      ASPIRIN 81 PO, Take 1 tablet by mouth daily, Disp: , Rfl:      atorvastatin (LIPITOR) 10 MG tablet, Take 1 tablet (10 mg) by mouth daily, Disp: 90 tablet, Rfl: 3     hydrocortisone (WESTCORT) 0.2 % external cream, Apply topically 2 times daily Apply to affected areas. (Patient not taking: Reported on 3/30/2021), Disp: 15 g, Rfl: 11     ketoconazole (NIZORAL) 2 % external  cream, Apply topically daily, Disp: 30 g, Rfl: 11     metoprolol succinate ER (TOPROL-XL) 25 MG 24 hr tablet, Take 1 tablet (25 mg) by mouth daily, Disp: 90 tablet, Rfl: 3     montelukast (SINGULAIR) 10 MG tablet, Take 1 tablet (10 mg) by mouth At Bedtime, Disp: 90 tablet, Rfl: 3     prazosin (MINIPRESS) 2 MG capsule, Take 1 capsule (2 mg) by mouth At Bedtime, Disp: 90 capsule, Rfl: 3    Allergies -   Allergies   Allergen Reactions     Lisinopril Cough       Social History -   Social History     Socioeconomic History     Marital status:      Spouse name: Not on file     Number of children: 3     Years of education: Not on file     Highest education level: Not on file   Occupational History     Employer: [employer]   Social Needs     Financial resource strain: Not on file     Food insecurity     Worry: Not on file     Inability: Not on file     Transportation needs     Medical: Not on file     Non-medical: Not on file   Tobacco Use     Smoking status: Never Smoker     Smokeless tobacco: Never Used   Substance and Sexual Activity     Alcohol use: Yes     Alcohol/week: 1.0 standard drinks     Types: 1 Standard drinks or equivalent per week     Comment: ocassional     Drug use: No     Sexual activity: Yes     Partners: Female   Lifestyle     Physical activity     Days per week: Not on file     Minutes per session: Not on file     Stress: Not on file   Relationships     Social connections     Talks on phone: Not on file     Gets together: Not on file     Attends Mosque service: Not on file     Active member of club or organization: Not on file     Attends meetings of clubs or organizations: Not on file     Relationship status: Not on file     Intimate partner violence     Fear of current or ex partner: Not on file     Emotionally abused: Not on file     Physically abused: Not on file     Forced sexual activity: Not on file   Other Topics Concern     Parent/sibling w/ CABG, MI or angioplasty before  65F 55M? No   Social History Narrative     Not on file       Family History -   Family History   Problem Relation Age of Onset     Hypertension Father      Eye Disorder Mother      Eye Disorder Other         mother's side of family has cataracts, and glaucoma?     Asthma No family hx of      C.A.D. No family hx of      Diabetes No family hx of      Cerebrovascular Disease No family hx of      Breast Cancer No family hx of      Cancer - colorectal No family hx of      Prostate Cancer No family hx of      Physical Exam  BP (!) 137/92   Pulse 75   Resp 16   SpO2 97%   General - The patient is in no distress. Alert and oriented to person and place, answers questions and cooperates with examination appropriately.   Voice and Breathing - The patient was breathing comfortably without the use of accessory muscles. There was no wheezing, stridor, or stertor.  The patients voice was clear and strong, and had appropriate pitch and quality. Some coughing, worse with talking.  Neuro - CN2-12 intact. HB 1 out of 6.   Eyes - Extraocular movements intact.  Sclera were not icteric or injected, conjunctiva were pink and moist.  Mouth - Examination of the oral cavity showed pink, healthy oral mucosa. No lesions or ulcerations noted.  The tongue was mobile and midline.  Throat - The walls of the oropharynx were smooth, symmetric, and had no lesions or ulcerations.  The uvula was midline on elevation. Tonsils were 1 + and quiet. No masses. Clear postnasal drainage.   Nose - External contour is symmetric, no gross deflection or scars. Has a right anterior and superior septal telangiectasia or broken vessel. No bleeding. I did cauterize this again today as he has had some persistent bleeding. The septum was to the right some, turbinates of normal size and position.  No polyps, masses, or purulence noted on examination.  Neck -  Soft, nontender. Palpation of the occipital, submental, submandibular, internal jugular chain, and  supraclavicular nodes did not demonstrate any abnormal lymph nodes or masses. Parotid glands had no masses. Palpation of the thyroid was soft and smooth, with no nodules or goiter appreciated.  The trachea was mobile and midline.    A/P - Ruddy Aaron is a 60 year old male with chronic cough but no evidence of infection, inflammation, or neoplasm on exam to explain the symptoms. He feels the cough triggered in his throat and its only with speaking and sometimes drinking liquids.  He had normal vocal cord appearance and function on last visits laryngoscopy. He has no other lung symptoms.  He also does not have allergy symptoms.  I think the most likely etiology is irritable larynx syndrome and hyperactive cough reflex. However, I want to try him on prilosec for 4-6 weeks. If this fails, I recommend voice therapy. I also want to rule-out chronic sinusitis with CT sinus.     He has epistaxis from the right septal telangiectasia with a broken blood vessel. His bleeding was better, but still persistent after initial cautery. I cauterized this again today.   He should use Vaseline a few times a day over the next 1 to 2 weeks.  If it continues to bleed he should return to see me. Recheck in a month.    Dr. Randy Pope  Otolaryngology  [facility]

## 2021-04-14 NOTE — LETTER
4/14/2021         RE: Ruddy Aaron  [address]  [place] [address]        Dear Colleague,    Thank you for referring your patient, Ruddy Aaron, to the [facility]. Please see a copy of my visit note below.    Chief Complaint - chronic cough    History of Present Illness - Ruddy Aaron is a 60 year old male who returns with a cough for approximately 6 months. They describe the cough as nonproductive. He feels it in his throat. It only happens when he talks or goes into cold air. Liquids have been more difficult. no troubles swallowing solids. Voicing has seemed normal. No pain. They note only rarely relux. The patient has tried singulair. They don't take lisinopril or other ACEI currently. He was on lisinopril, stopped it and cough went away, but then came back 3 months later. A trip to the [facility] helped. CXR not done. They deny breathing problems. None smoker. I saw him 2 weeks ago.  I think the most likely etiology is irritable larynx syndrome and hyperactive cough reflex.  I recommend voice therapy. He returns and notes he was in the [place] for 1 week and cough went away. However, it came back. He thinks cold air makes it worse.     He also had epistaxis. I cauterized his right septum. Unfortunately he started having bleeding again.     Past Medical History -   Patient Active Problem List   Diagnosis     CARDIOVASCULAR SCREENING; LDL GOAL LESS THAN 130     Headaches, tension-type     Chronic eczema     Essential hypertension     Seasonal allergic rhinitis     Overweight (BMI 25.0-29.9)     Neck pain     Nonallergic rhinitis     Combined forms of age-related cataract, mild, of both eyes     Seborrheic dermatitis     Hyperlipidemia LDL goal <100     Upper airway cough syndrome       Current Medications -   Current Outpatient Medications:      ASPIRIN 81 PO, Take 1 tablet by mouth daily, Disp: , Rfl:      atorvastatin (LIPITOR) 10 MG tablet, Take 1 tablet (10 mg)  by mouth daily, Disp: 90 tablet, Rfl: 3     hydrocortisone (WESTCORT) 0.2 % external cream, Apply topically 2 times daily Apply to affected areas. (Patient not taking: Reported on 3/30/2021), Disp: 15 g, Rfl: 11     ketoconazole (NIZORAL) 2 % external cream, Apply topically daily, Disp: 30 g, Rfl: 11     metoprolol succinate ER (TOPROL-XL) 25 MG 24 hr tablet, Take 1 tablet (25 mg) by mouth daily, Disp: 90 tablet, Rfl: 3     montelukast (SINGULAIR) 10 MG tablet, Take 1 tablet (10 mg) by mouth At Bedtime, Disp: 90 tablet, Rfl: 3     prazosin (MINIPRESS) 2 MG capsule, Take 1 capsule (2 mg) by mouth At Bedtime, Disp: 90 capsule, Rfl: 3    Allergies -   Allergies   Allergen Reactions     Lisinopril Cough       Social History -   Social History     Socioeconomic History     Marital status:      Spouse name: Not on file     Number of children: 3     Years of education: Not on file     Highest education level: Not on file   Occupational History     Employer: [employer]   Social Needs     Financial resource strain: Not on file     Food insecurity     Worry: Not on file     Inability: Not on file     Transportation needs     Medical: Not on file     Non-medical: Not on file   Tobacco Use     Smoking status: Never Smoker     Smokeless tobacco: Never Used   Substance and Sexual Activity     Alcohol use: Yes     Alcohol/week: 1.0 standard drinks     Types: 1 Standard drinks or equivalent per week     Comment: ocassional     Drug use: No     Sexual activity: Yes     Partners: Female   Lifestyle     Physical activity     Days per week: Not on file     Minutes per session: Not on file     Stress: Not on file   Relationships     Social connections     Talks on phone: Not on file     Gets together: Not on file     Attends Catholic service: Not on file     Active member of club or organization: Not on file     Attends meetings of clubs or organizations: Not on file     Relationship status: Not on file     Intimate  partner violence     Fear of current or ex partner: Not on file     Emotionally abused: Not on file     Physically abused: Not on file     Forced sexual activity: Not on file   Other Topics Concern     Parent/sibling w/ CABG, MI or angioplasty before 65F 55M? No   Social History Narrative     Not on file       Family History -   Family History   Problem Relation Age of Onset     Hypertension Father      Eye Disorder Mother      Eye Disorder Other         mother's side of family has cataracts, and glaucoma?     Asthma No family hx of      C.A.D. No family hx of      Diabetes No family hx of      Cerebrovascular Disease No family hx of      Breast Cancer No family hx of      Cancer - colorectal No family hx of      Prostate Cancer No family hx of      Physical Exam  BP (!) 137/92   Pulse 75   Resp 16   SpO2 97%   General - The patient is in no distress. Alert and oriented to person and place, answers questions and cooperates with examination appropriately.   Voice and Breathing - The patient was breathing comfortably without the use of accessory muscles. There was no wheezing, stridor, or stertor.  The patients voice was clear and strong, and had appropriate pitch and quality. Some coughing, worse with talking.  Neuro - CN2-12 intact. HB 1 out of 6.   Eyes - Extraocular movements intact.  Sclera were not icteric or injected, conjunctiva were pink and moist.  Mouth - Examination of the oral cavity showed pink, healthy oral mucosa. No lesions or ulcerations noted.  The tongue was mobile and midline.  Throat - The walls of the oropharynx were smooth, symmetric, and had no lesions or ulcerations.  The uvula was midline on elevation. Tonsils were 1 + and quiet. No masses. Clear postnasal drainage.   Nose - External contour is symmetric, no gross deflection or scars. Has a right anterior and superior septal telangiectasia or broken vessel. No bleeding. I did cauterize this again today as he has had some persistent  bleeding. The septum was to the right some, turbinates of normal size and position.  No polyps, masses, or purulence noted on examination.  Neck -  Soft, nontender. Palpation of the occipital, submental, submandibular, internal jugular chain, and supraclavicular nodes did not demonstrate any abnormal lymph nodes or masses. Parotid glands had no masses. Palpation of the thyroid was soft and smooth, with no nodules or goiter appreciated.  The trachea was mobile and midline.    A/P - Dev JUAN Aaron is a 60 year old male with chronic cough but no evidence of infection, inflammation, or neoplasm on exam to explain the symptoms. He feels the cough triggered in his throat and its only with speaking and sometimes drinking liquids.  He had normal vocal cord appearance and function on last visits laryngoscopy. He has no other lung symptoms.  He also does not have allergy symptoms.  I think the most likely etiology is irritable larynx syndrome and hyperactive cough reflex. However, I want to try him on prilosec for 4-6 weeks. If this fails, I recommend voice therapy. I also want to rule-out chronic sinusitis with CT sinus.     He has epistaxis from the right septal telangiectasia with a broken blood vessel. His bleeding was better, but still persistent after initial cautery. I cauterized this again today.   He should use Vaseline a few times a day over the next 1 to 2 weeks.  If it continues to bleed he should return to see me. Recheck in a month.    Dr. Randy Pope  Otolaryngology  [facility]        Again, thank you for allowing me to participate in the care of your patient.        Sincerely,        Randy Pope MD

## 2021-04-21 ENCOUNTER — ANCILLARY PROCEDURE (OUTPATIENT)
Dept: CT IMAGING | Facility: CLINIC | Age: 61
End: 2021-04-21
Attending: OTOLARYNGOLOGY
Payer: COMMERCIAL

## 2021-04-21 DIAGNOSIS — R05.3 CHRONIC COUGH: ICD-10-CM

## 2021-04-21 DIAGNOSIS — R09.82 PND (POST-NASAL DRIP): ICD-10-CM

## 2021-04-21 DIAGNOSIS — I10 HYPERTENSION GOAL BP (BLOOD PRESSURE) < 130/80: ICD-10-CM

## 2021-04-21 PROCEDURE — 70486 CT MAXILLOFACIAL W/O DYE: CPT | Performed by: STUDENT IN AN ORGANIZED HEALTH CARE EDUCATION/TRAINING PROGRAM

## 2021-04-21 RX ORDER — HYDROCHLOROTHIAZIDE 25 MG/1
25 TABLET ORAL
Qty: 90 TABLET | Refills: 0 | OUTPATIENT
Start: 2021-04-21

## 2021-04-23 ENCOUNTER — OFFICE VISIT (OUTPATIENT)
Dept: OTOLARYNGOLOGY | Facility: CLINIC | Age: 61
End: 2021-04-23
Payer: COMMERCIAL

## 2021-04-23 VITALS
OXYGEN SATURATION: 100 % | RESPIRATION RATE: 16 BRPM | DIASTOLIC BLOOD PRESSURE: 89 MMHG | HEART RATE: 66 BPM | SYSTOLIC BLOOD PRESSURE: 132 MMHG

## 2021-04-23 DIAGNOSIS — R04.0 EPISTAXIS: Primary | ICD-10-CM

## 2021-04-23 PROCEDURE — 99213 OFFICE O/P EST LOW 20 MIN: CPT | Performed by: OTOLARYNGOLOGY

## 2021-04-23 NOTE — LETTER
4/23/2021         RE: Ruddy Aaron  [address]  [place] [address]        Dear Colleague,    Thank you for referring your patient, Ruddy Aaron, to the [facility]. Please see a copy of my visit note below.    Chief Complaint - Epistaxis    History of Present Illness - Ruddy Aaron is a 60 year old male who returns with approximately weeks of epistaxis. It occurs on the right heidy. It usually only comes out the front of the nose, but it has ran down the back of the throat at times. I've cauterized the septum and what looks like a telangiectasia twice. He hasn't had severe bleeding where he thinks he needs to go to the emergency department. It bleeds for a few minutes in the shower. He hasn't been using antibiotic ointment.     Past Medical History -   Patient Active Problem List   Diagnosis     CARDIOVASCULAR SCREENING; LDL GOAL LESS THAN 130     Headaches, tension-type     Chronic eczema     Essential hypertension     Seasonal allergic rhinitis     Overweight (BMI 25.0-29.9)     Neck pain     Nonallergic rhinitis     Combined forms of age-related cataract, mild, of both eyes     Seborrheic dermatitis     Hyperlipidemia LDL goal <100     Upper airway cough syndrome       Current Medications -   Current Outpatient Medications:      ASPIRIN 81 PO, Take 1 tablet by mouth daily, Disp: , Rfl:      atorvastatin (LIPITOR) 10 MG tablet, Take 1 tablet (10 mg) by mouth daily, Disp: 90 tablet, Rfl: 3     hydrocortisone (WESTCORT) 0.2 % external cream, Apply topically 2 times daily Apply to affected areas. (Patient not taking: Reported on 3/30/2021), Disp: 15 g, Rfl: 11     ketoconazole (NIZORAL) 2 % external cream, Apply topically daily, Disp: 30 g, Rfl: 11     metoprolol succinate ER (TOPROL-XL) 25 MG 24 hr tablet, Take 1 tablet (25 mg) by mouth daily, Disp: 90 tablet, Rfl: 3     montelukast (SINGULAIR) 10 MG tablet, Take 1 tablet (10 mg) by mouth At Bedtime, Disp: 90 tablet, Rfl:  3     omeprazole (PRILOSEC) 20 MG DR capsule, Take 1 capsule (20 mg) by mouth daily, Disp: 30 capsule, Rfl: 3     prazosin (MINIPRESS) 2 MG capsule, Take 1 capsule (2 mg) by mouth At Bedtime, Disp: 90 capsule, Rfl: 3    Allergies -   Allergies   Allergen Reactions     Lisinopril Cough       Social History -   Social History     Socioeconomic History     Marital status:      Spouse name: Not on file     Number of children: 3     Years of education: Not on file     Highest education level: Not on file   Occupational History     Employer: [employer]   Social Needs     Financial resource strain: Not on file     Food insecurity     Worry: Not on file     Inability: Not on file     Transportation needs     Medical: Not on file     Non-medical: Not on file   Tobacco Use     Smoking status: Never Smoker     Smokeless tobacco: Never Used   Substance and Sexual Activity     Alcohol use: Yes     Alcohol/week: 1.0 standard drinks     Types: 1 Standard drinks or equivalent per week     Comment: ocassional     Drug use: No     Sexual activity: Yes     Partners: Female   Lifestyle     Physical activity     Days per week: Not on file     Minutes per session: Not on file     Stress: Not on file   Relationships     Social connections     Talks on phone: Not on file     Gets together: Not on file     Attends Evangelical service: Not on file     Active member of club or organization: Not on file     Attends meetings of clubs or organizations: Not on file     Relationship status: Not on file     Intimate partner violence     Fear of current or ex partner: Not on file     Emotionally abused: Not on file     Physically abused: Not on file     Forced sexual activity: Not on file   Other Topics Concern     Parent/sibling w/ CABG, MI or angioplasty before 65F 55M? No   Social History Narrative     Not on file       Family History -   Family History   Problem Relation Age of Onset     Hypertension Father      Eye Disorder  Mother      Eye Disorder Other         mother's side of family has cataracts, and glaucoma?     Asthma No family hx of      C.A.D. No family hx of      Diabetes No family hx of      Cerebrovascular Disease No family hx of      Breast Cancer No family hx of      Cancer - colorectal No family hx of      Prostate Cancer No family hx of        Physical Exam  /89   Pulse 66   Resp 16   SpO2 100%   General - The patient is in no distress.  Alert and oriented x3, answers questions and cooperates with examination appropriately.   Voice and Breathing - The patient was breathing comfortably without the use of accessory muscles. There was no wheezing, stridor, or stertor.  The patients voice was clear and strong, with no dysphonia.  Head and Face - Normocephalic and atraumatic.    Nose - No significant external deformity. The nasal mucosa along the anterior septum on the right side shows crusting and dried blood. It is dry. There is one prominent blood vessels superficially located. The left side was mostly normal.  The septum was midline, turbinates are of normal size and position.  No polyps, masses, or purulence.    Procedure - I sprayed the right anterior nasal cavity and septum with phenylephrine and lidocaine. I pulled the crust and clot off with a suction. It bled easily. I applied 3 sticks of silver nitrate to the prominent blood vessel and likely telangiectasia along the anterior septum. Hemostasis was achieved. I applied bacitracin ointment to the wound with a q-tip.    A/P - Dev JUAN Aaron is a 60 year old male with epistaxis. This is certainly coming from the right anterior septum likely telangiectasia. I cauterized this today for the 3rd time. I offered hot knife, but he deferred. We discussed restrictions on manipulation and picking of the nose. I explained using vaseline 2-3 daily to the anterior septum, which he wasn't doing before.    He has chronic cough and is trying prilosec. Return in one month to  recheck this.     I also explained applying digital pressure to the nasal tip for a minimum of 15-20 minutes to stop a nose bleed. If that doesn't stop the bleeding the patient needs to proceed to the nearest emergency department or return to ENT clinic.     Randy Pope MD  Otolaryngology  [facility]        Again, thank you for allowing me to participate in the care of your patient.        Sincerely,        Randy Pope MD

## 2021-04-23 NOTE — PROGRESS NOTES
Chief Complaint - Epistaxis    History of Present Illness - Ruddy Aaron is a 60 year old male who returns with approximately weeks of epistaxis. It occurs on the right heidy. It usually only comes out the front of the nose, but it has ran down the back of the throat at times. I've cauterized the septum and what looks like a telangiectasia twice. He hasn't had severe bleeding where he thinks he needs to go to the emergency department. It bleeds for a few minutes in the shower. He hasn't been using antibiotic ointment.     Past Medical History -   Patient Active Problem List   Diagnosis     CARDIOVASCULAR SCREENING; LDL GOAL LESS THAN 130     Headaches, tension-type     Chronic eczema     Essential hypertension     Seasonal allergic rhinitis     Overweight (BMI 25.0-29.9)     Neck pain     Nonallergic rhinitis     Combined forms of age-related cataract, mild, of both eyes     Seborrheic dermatitis     Hyperlipidemia LDL goal <100     Upper airway cough syndrome       Current Medications -   Current Outpatient Medications:      ASPIRIN 81 PO, Take 1 tablet by mouth daily, Disp: , Rfl:      atorvastatin (LIPITOR) 10 MG tablet, Take 1 tablet (10 mg) by mouth daily, Disp: 90 tablet, Rfl: 3     hydrocortisone (WESTCORT) 0.2 % external cream, Apply topically 2 times daily Apply to affected areas. (Patient not taking: Reported on 3/30/2021), Disp: 15 g, Rfl: 11     ketoconazole (NIZORAL) 2 % external cream, Apply topically daily, Disp: 30 g, Rfl: 11     metoprolol succinate ER (TOPROL-XL) 25 MG 24 hr tablet, Take 1 tablet (25 mg) by mouth daily, Disp: 90 tablet, Rfl: 3     montelukast (SINGULAIR) 10 MG tablet, Take 1 tablet (10 mg) by mouth At Bedtime, Disp: 90 tablet, Rfl: 3     omeprazole (PRILOSEC) 20 MG DR capsule, Take 1 capsule (20 mg) by mouth daily, Disp: 30 capsule, Rfl: 3     prazosin (MINIPRESS) 2 MG capsule, Take 1 capsule (2 mg) by mouth At Bedtime, Disp: 90 capsule, Rfl: 3    Allergies -   Allergies   Allergen  Reactions     Lisinopril Cough       Social History -   Social History     Socioeconomic History     Marital status:      Spouse name: Not on file     Number of children: 3     Years of education: Not on file     Highest education level: Not on file   Occupational History     Employer: [employer]   Social Needs     Financial resource strain: Not on file     Food insecurity     Worry: Not on file     Inability: Not on file     Transportation needs     Medical: Not on file     Non-medical: Not on file   Tobacco Use     Smoking status: Never Smoker     Smokeless tobacco: Never Used   Substance and Sexual Activity     Alcohol use: Yes     Alcohol/week: 1.0 standard drinks     Types: 1 Standard drinks or equivalent per week     Comment: ocassional     Drug use: No     Sexual activity: Yes     Partners: Female   Lifestyle     Physical activity     Days per week: Not on file     Minutes per session: Not on file     Stress: Not on file   Relationships     Social connections     Talks on phone: Not on file     Gets together: Not on file     Attends Rastafari service: Not on file     Active member of club or organization: Not on file     Attends meetings of clubs or organizations: Not on file     Relationship status: Not on file     Intimate partner violence     Fear of current or ex partner: Not on file     Emotionally abused: Not on file     Physically abused: Not on file     Forced sexual activity: Not on file   Other Topics Concern     Parent/sibling w/ CABG, MI or angioplasty before 65F 55M? No   Social History Narrative     Not on file       Family History -   Family History   Problem Relation Age of Onset     Hypertension Father      Eye Disorder Mother      Eye Disorder Other         mother's side of family has cataracts, and glaucoma?     Asthma No family hx of      C.A.D. No family hx of      Diabetes No family hx of      Cerebrovascular Disease No family hx of      Breast Cancer No family hx of       Cancer - colorectal No family hx of      Prostate Cancer No family hx of        Physical Exam  /89   Pulse 66   Resp 16   SpO2 100%   General - The patient is in no distress.  Alert and oriented x3, answers questions and cooperates with examination appropriately.   Voice and Breathing - The patient was breathing comfortably without the use of accessory muscles. There was no wheezing, stridor, or stertor.  The patients voice was clear and strong, with no dysphonia.  Head and Face - Normocephalic and atraumatic.    Nose - No significant external deformity. The nasal mucosa along the anterior septum on the right side shows crusting and dried blood. It is dry. There is one prominent blood vessels superficially located. The left side was mostly normal.  The septum was midline, turbinates are of normal size and position.  No polyps, masses, or purulence.    Procedure - I sprayed the right anterior nasal cavity and septum with phenylephrine and lidocaine. I pulled the crust and clot off with a suction. It bled easily. I applied 3 sticks of silver nitrate to the prominent blood vessel and likely telangiectasia along the anterior septum. Hemostasis was achieved. I applied bacitracin ointment to the wound with a q-tip.    A/P - Dev JUAN Aaron is a 60 year old male with epistaxis. This is certainly coming from the right anterior septum likely telangiectasia. I cauterized this today for the 3rd time. I offered hot knife, but he deferred. We discussed restrictions on manipulation and picking of the nose. I explained using vaseline 2-3 daily to the anterior septum, which he wasn't doing before.    He has chronic cough and is trying prilosec. Return in one month to recheck this.     I also explained applying digital pressure to the nasal tip for a minimum of 15-20 minutes to stop a nose bleed. If that doesn't stop the bleeding the patient needs to proceed to the nearest emergency department or return to ENT clinic.      Randy Pope MD  Otolaryngology  [facility]

## 2021-05-19 DIAGNOSIS — K21.9 LPRD (LARYNGOPHARYNGEAL REFLUX DISEASE): ICD-10-CM

## 2021-05-20 ENCOUNTER — MYC MEDICAL ADVICE (OUTPATIENT)
Dept: FAMILY MEDICINE | Facility: CLINIC | Age: 61
End: 2021-05-20

## 2021-05-20 ENCOUNTER — NURSE TRIAGE (OUTPATIENT)
Dept: FAMILY MEDICINE | Facility: CLINIC | Age: 61
End: 2021-05-20

## 2021-05-20 DIAGNOSIS — I10 ESSENTIAL HYPERTENSION: Primary | ICD-10-CM

## 2021-05-20 RX ORDER — TRIAMTERENE AND HYDROCHLOROTHIAZIDE 37.5; 25 MG/1; MG/1
1 CAPSULE ORAL EVERY MORNING
Qty: 30 CAPSULE | Refills: 5 | Status: SHIPPED | OUTPATIENT
Start: 2021-05-21 | End: 2021-09-15

## 2021-05-20 NOTE — TELEPHONE ENCOUNTER
Signed Prescriptions:                        Disp   Refills    omeprazole (PRILOSEC) 20 MG DR capsule     30 cap*3        Sig: Take 1 capsule (20 mg) by mouth daily  Authorizing Provider: EDWIN PATEL  Ordering User: ASHLEY TONG    Prescription approved per [facility] Refill Protocol.    Ashley Tong RN

## 2021-05-20 NOTE — TELEPHONE ENCOUNTER
Please see the May 20, 2021 telephone message for further information.    Becka Yeager RN, [facility]

## 2021-05-20 NOTE — TELEPHONE ENCOUNTER
Please see the May 20, 2021 telephone message for further information.    Becka Yeager RN, [facility]

## 2021-05-20 NOTE — TELEPHONE ENCOUNTER
Ruddy Aaron, Berry Andrews MD 1 hour ago (5:52 AM)        Good Morning Dr Lazo, I have an Urgent concern, I have been feeling dizzy recently, my blood pressure last night was 160/110, it s been going on for a while, I can t get in to see you until June, do you have anything available early this morning, or Monday. Or any suggestions, please let me know, thanks

## 2021-05-20 NOTE — TELEPHONE ENCOUNTER
This writer attempted to contact Dev on 05/20/21      Reason for call triage and left message.      If patient calls back:   Registered Nurse called. Follow Triage Call workflow, Marah Padilla  Triaging for the MyChart patient sent. Copied into message below.        Becka Yegaer RN

## 2021-05-20 NOTE — TELEPHONE ENCOUNTER
I spoke with patient today.    Other than vertigo, he denies any stroke-like symptoms.    Patient advised to stop Prazosin, start Dyazide and follow-up in 1 month.

## 2021-05-20 NOTE — TELEPHONE ENCOUNTER
"Patient taking Metoprolol 25mg and Prazosin 2mg  C/o dizziness on and off for the past few months  Yesterday felt nauseous with the dizziness, lasted a couple hours, took a 30 minute nap which helped  Laying down helps with the dizziness  Blood pressure last night at midnight = 160/110. Rechecked a little after midnight = 152/99.  This morning = 142/90.      To provider to advise      Becka ASHTONN, RN    Reason for Disposition    Taking BP medications and feels is having side effects (e.g., impotence, cough, dizziness)    Additional Information    Negative: Sounds like a life-threatening emergency to the triager    Negative: Pregnant > 20 weeks or postpartum (< 6 weeks after delivery) and new hand or face swelling    Negative: Pregnant > 20 weeks and BP > 140/90    Negative: Systolic BP >= 160 OR Diastolic >= 100, and any cardiac or neurologic symptoms (e.g., chest pain, difficulty breathing, unsteady gait, blurred vision)    Negative: Patient sounds very sick or weak to the triager    Negative: BP Systolic BP >= 140 OR Diastolic >= 90 and postpartum (from 0 to 6 weeks after delivery)    Negative: Systolic BP >= 180 OR Diastolic >= 110, and missed most recent dose of blood pressure medication    Negative: Systolic BP >= 180 OR Diastolic >= 110    Negative: Patient wants to be seen    Answer Assessment - Initial Assessment Questions  1. BLOOD PRESSURE: \"What is the blood pressure?\" \"Did you take at least two measurements 5 minutes apart?\"      Blood pressure last night at midnight was 160/110, this morning - 145/92  2. ONSET: \"When did you take your blood pressure?\"      This morning  3. HOW: \"How did you obtain the blood pressure?\" (e.g., visiting nurse, automatic home BP monitor)      Home monitor  4. HISTORY: \"Do you have a history of high blood pressure?\"      yes  5. MEDICATIONS: \"Are you taking any medications for blood pressure?\" \"Have you missed any doses recently?\"      Yes patient takes blood pressure " "meds, has not missed any doses  6. OTHER SYMPTOMS: \"Do you have any symptoms?\" (e.g., headache, chest pain, blurred vision, difficulty breathing, weakness)      Dizziness last night, no dizziness this morning, no other symptoms   7. PREGNANCY: \"Is there any chance you are pregnant?\" \"When was your last menstrual period?\"      N/A    Protocols used: HIGH BLOOD PRESSURE-A-[place]    "

## 2021-07-19 DIAGNOSIS — I10 ESSENTIAL HYPERTENSION: ICD-10-CM

## 2021-07-20 RX ORDER — TRIAMTERENE AND HYDROCHLOROTHIAZIDE 37.5; 25 MG/1; MG/1
CAPSULE ORAL
Qty: 90 CAPSULE | Refills: 1 | OUTPATIENT
Start: 2021-07-20

## 2021-07-20 NOTE — TELEPHONE ENCOUNTER
Duplicate refill request for triamterene-hydrochlorothiazide 37.5-25 MG. This prescription was refilled on 5/21/21. A 30 day supply + 5 refills was sent to patient's preferred pharmacy on file.       Polina Vazquez RN  [facility]

## 2021-07-23 ENCOUNTER — MYC MEDICAL ADVICE (OUTPATIENT)
Dept: FAMILY MEDICINE | Facility: CLINIC | Age: 61
End: 2021-07-23

## 2021-07-23 DIAGNOSIS — Z71.89 ADVICE GIVEN ABOUT COVID-19 VIRUS INFECTION: Primary | ICD-10-CM

## 2021-07-26 DIAGNOSIS — Z71.89 ADVICE GIVEN ABOUT COVID-19 VIRUS INFECTION: ICD-10-CM

## 2021-07-26 PROCEDURE — U0005 INFEC AGEN DETEC AMPLI PROBE: HCPCS

## 2021-07-26 PROCEDURE — U0003 INFECTIOUS AGENT DETECTION BY NUCLEIC ACID (DNA OR RNA); SEVERE ACUTE RESPIRATORY SYNDROME CORONAVIRUS 2 (SARS-COV-2) (CORONAVIRUS DISEASE [COVID-19]), AMPLIFIED PROBE TECHNIQUE, MAKING USE OF HIGH THROUGHPUT TECHNOLOGIES AS DESCRIBED BY CMS-2020-01-R: HCPCS

## 2021-07-27 LAB — SARS-COV-2 RNA RESP QL NAA+PROBE: NEGATIVE

## 2021-08-09 DIAGNOSIS — I10 ESSENTIAL HYPERTENSION: ICD-10-CM

## 2021-08-10 DIAGNOSIS — K21.9 LPRD (LARYNGOPHARYNGEAL REFLUX DISEASE): ICD-10-CM

## 2021-08-11 RX ORDER — TRIAMTERENE AND HYDROCHLOROTHIAZIDE 37.5; 25 MG/1; MG/1
CAPSULE ORAL
Qty: 90 CAPSULE | Refills: 1
Start: 2021-08-11

## 2021-08-11 NOTE — TELEPHONE ENCOUNTER
Duplicate refill request for triamterene-hydrochlorothiazide 37.5-25 MG.   This prescription was refilled on 5/21/21.   A 30 day supply + 5 refills was sent to the pharmacy.       Polina Vazquez RN  [facility]

## 2021-09-04 ENCOUNTER — HEALTH MAINTENANCE LETTER (OUTPATIENT)
Age: 61
End: 2021-09-04

## 2021-09-13 DIAGNOSIS — I10 ESSENTIAL HYPERTENSION: ICD-10-CM

## 2021-09-14 DIAGNOSIS — I10 ESSENTIAL HYPERTENSION: ICD-10-CM

## 2021-09-14 RX ORDER — TRIAMTERENE AND HYDROCHLOROTHIAZIDE 37.5; 25 MG/1; MG/1
1 CAPSULE ORAL EVERY MORNING
Qty: 30 CAPSULE | Refills: 5 | OUTPATIENT
Start: 2021-09-14

## 2021-09-15 RX ORDER — TRIAMTERENE AND HYDROCHLOROTHIAZIDE 37.5; 25 MG/1; MG/1
CAPSULE ORAL
Qty: 90 CAPSULE | Refills: 0 | Status: SHIPPED | OUTPATIENT
Start: 2021-09-15 | End: 2021-09-17

## 2021-09-16 NOTE — PROGRESS NOTES
[facility] Internal Medicine Progress Note           Assessment and Plan:     Essential hypertension (primary diagnosis)  - metoprolol succinate ER (TOPROL-XL) 25 MG 24 hr tablet; Take 1 tablet (25 mg) by mouth daily  - triamterene-HCTZ (DYAZIDE) 37.5-25 MG capsule; TAKE 1 CAPSULE BY MOUTH EVERY DAY IN THE MORNING    Chronic cough  - Adult Pulmonary Medicine Referral; Future  - XR Chest 2 Views    BPH associated with nocturia  - tamsulosin (FLOMAX) 0.4 MG capsule; Take 1 capsule (0.4 mg) by mouth At Bedtime  - Adult Urology Referral; Future    Need for diphtheria-tetanus-pertussis (Tdap) vaccine  - TDAP VACCINE (Adacel, Boostrix)  [[id number]]           Interval History:     Ruddy is a 60 year old male who presents for the following health issues     Hypertension follow-up  -two BP monitors at home with discrepant readings  -[employer] bp monitor uses rafat for records and averages:   August 2021: average of 120/84  Peak: 126/87   September: average of 121/85 Peak: 129/90   July: 126/87, 142/96   Dayna: 126/87, 143/86   May: 130/91, 150/105  Medication updates: prazosin discontinued last 5/20/21 in favor of Dyazide 37.5/25 mg.    Chronic cough  -given acid reflux medication (Omeprazole) but it's not effective.    Nocturia  -currently on diuretic  -previously on Prazosin              Significant Problems:   Patient Active Problem List   Diagnosis     CARDIOVASCULAR SCREENING; LDL GOAL LESS THAN 130     Headaches, tension-type     Chronic eczema     Essential hypertension     Seasonal allergic rhinitis     Overweight (BMI 25.0-29.9)     Neck pain     Nonallergic rhinitis     Combined forms of age-related cataract, mild, of both eyes     Seborrheic dermatitis     Hyperlipidemia LDL goal <100     Upper airway cough syndrome              Review of Systems:   CONSTITUTIONAL: NEGATIVE for fever, chills, change in weight  INTEGUMENTARY/SKIN: NEGATIVE for worrisome rashes, moles or lesions  EYES: NEGATIVE for vision  "changes or irritation  ENT/MOUTH: NEGATIVE for ear, mouth and throat problems  RESP:NEGATIVE for dyspnea on exertion, hemoptysis, pleurisy and wheezing  CV: NEGATIVE for chest pain, palpitations or peripheral edema  GI: NEGATIVE for nausea, abdominal pain, heartburn, or change in bowel habits   male :NEGATIVE for dysuria, hematuria and incontinence  MUSCULOSKELETAL: NEGATIVE for significant arthralgias or myalgia  NEURO: NEGATIVE for weakness, dizziness or paresthesias  ENDOCRINE: NEGATIVE for temperature intolerance, skin/hair changes  HEME: NEGATIVE for bleeding problems  PSYCHIATRIC: NEGATIVE for changes in mood or affect             Physical Exam:   /81 (BP Location: Left arm, Patient Position: Chair, Cuff Size: Adult Regular)   Pulse 68   Temp 96.9  F (36.1  C) (Tympanic)   Ht 1.803 m (5' 11\")   Wt 87.1 kg (192 lb)   SpO2 96%   BMI 26.78 kg/m    Constitutional: Awake, alert, cooperative, no apparent distress, and appears stated age.  Eyes: extra-ocular muscles intact  ENT: normocepalic, without obvious abnormality  Lungs: no increased work of breathing, no retractions and clear to auscultation  Cardiovascular: regular rate and rhythm  Genitourinary: Not examined.  Musculoskeletal: no lower extremity pitting edema present  Neurologic: Mental Status Exam:  Level of Alertness:   awake  Orientation:   person, place, time  Memory:   normal  Fund of Knowledge:  normal  Attention/Concentration:  normal  Language:  normal  Motor Exam:  Motor exam is symmetrical 5 out of 5 all extremities bilaterally  Neuropsychiatric: Normal affect, mood, orientation, memory and insight.  Skin: No rashes, erythema, pallor, petechia or purpura.          Data:   CHEST TWO VIEWS   9/17/2021 10:52 AM      HISTORY: Chronic cough.     COMPARISON: None.                                                                      IMPRESSION: PA and lateral views of the chest. Lungs are clear. Heart  is normal in size. No effusions are " evident. No pneumothorax.     JD ROJO MD        Disposition: Follow-up in 4 weeks.      Berry Lazo MD  Internal Medicine  [facility] Team

## 2021-09-16 NOTE — PATIENT INSTRUCTIONS
At [facility], we strive to deliver an exceptional experience to you, every time we see you. If you receive a survey, please complete it as we do value your feedback.  If you have MyChart, you can expect to receive results automatically within 24 hours of their completion.  Your provider will send a note interpreting your results as well.   If you do not have MyChart, you should receive your results in about a week by mail.    Your care team:                            Family Medicine Internal Medicine   MD Berry Gutiérerz MD Shantel Branch-Fleming, MD Srinivasa Vaka, MD Katya Belousova, PAKAYLEN Hodge, APRN CNP    Kai Fallon, MD Pediatrics   Guanakito Nunez, PAKAYLEN Pete, CNP MD Yamile Hernandez APRN CNP   MD Flori Corey MD Deborah Mielke, MD Lety Peacock, APRN [facility]      Clinic hours: Monday - Thursday 7 am-6 pm; Fridays 7 am-5 pm.   Urgent care: Monday - Friday 10 am- 8 pm; Saturday and Sunday 9 am-5 pm.    Clinic: [phone]       [facility]: Monday - Thursday 8 am - 7 pm; Friday 8 am - 6 pm  [facility]: [phone]     Use [email or web address] for 24/7 diagnosis and treatment of dozens of conditions.

## 2021-09-17 ENCOUNTER — OFFICE VISIT (OUTPATIENT)
Dept: FAMILY MEDICINE | Facility: CLINIC | Age: 61
End: 2021-09-17
Payer: COMMERCIAL

## 2021-09-17 ENCOUNTER — ANCILLARY PROCEDURE (OUTPATIENT)
Dept: GENERAL RADIOLOGY | Facility: CLINIC | Age: 61
End: 2021-09-17
Attending: INTERNAL MEDICINE
Payer: COMMERCIAL

## 2021-09-17 VITALS
TEMPERATURE: 96.9 F | HEART RATE: 68 BPM | WEIGHT: 192 LBS | BODY MASS INDEX: 26.88 KG/M2 | HEIGHT: 71 IN | DIASTOLIC BLOOD PRESSURE: 81 MMHG | SYSTOLIC BLOOD PRESSURE: 121 MMHG | OXYGEN SATURATION: 96 %

## 2021-09-17 DIAGNOSIS — R35.1 BPH ASSOCIATED WITH NOCTURIA: ICD-10-CM

## 2021-09-17 DIAGNOSIS — I10 ESSENTIAL HYPERTENSION: Primary | ICD-10-CM

## 2021-09-17 DIAGNOSIS — Z23 NEED FOR DIPHTHERIA-TETANUS-PERTUSSIS (TDAP) VACCINE: ICD-10-CM

## 2021-09-17 DIAGNOSIS — R05.3 CHRONIC COUGH: ICD-10-CM

## 2021-09-17 DIAGNOSIS — N40.1 BPH ASSOCIATED WITH NOCTURIA: ICD-10-CM

## 2021-09-17 PROCEDURE — 99214 OFFICE O/P EST MOD 30 MIN: CPT | Mod: 25 | Performed by: INTERNAL MEDICINE

## 2021-09-17 PROCEDURE — 90471 IMMUNIZATION ADMIN: CPT | Performed by: INTERNAL MEDICINE

## 2021-09-17 PROCEDURE — 71046 X-RAY EXAM CHEST 2 VIEWS: CPT | Performed by: RADIOLOGY

## 2021-09-17 PROCEDURE — 90682 RIV4 VACC RECOMBINANT DNA IM: CPT | Performed by: INTERNAL MEDICINE

## 2021-09-17 PROCEDURE — 90472 IMMUNIZATION ADMIN EACH ADD: CPT | Performed by: INTERNAL MEDICINE

## 2021-09-17 PROCEDURE — 90715 TDAP VACCINE 7 YRS/> IM: CPT | Performed by: INTERNAL MEDICINE

## 2021-09-17 RX ORDER — TRIAMTERENE AND HYDROCHLOROTHIAZIDE 37.5; 25 MG/1; MG/1
CAPSULE ORAL
Qty: 90 CAPSULE | Refills: 3 | Status: SHIPPED | OUTPATIENT
Start: 2021-09-17 | End: 2022-02-10

## 2021-09-17 RX ORDER — TAMSULOSIN HYDROCHLORIDE 0.4 MG/1
0.4 CAPSULE ORAL AT BEDTIME
Qty: 90 CAPSULE | Refills: 3 | Status: SHIPPED | OUTPATIENT
Start: 2021-09-17 | End: 2021-11-30

## 2021-09-17 RX ORDER — METOPROLOL SUCCINATE 25 MG/1
25 TABLET, EXTENDED RELEASE ORAL DAILY
Qty: 90 TABLET | Refills: 3 | Status: SHIPPED | OUTPATIENT
Start: 2021-09-17 | End: 2022-02-10

## 2021-09-17 ASSESSMENT — PAIN SCALES - GENERAL: PAINLEVEL: NO PAIN (0)

## 2021-09-17 ASSESSMENT — MIFFLIN-ST. JEOR: SCORE: 1713.04

## 2021-09-17 NOTE — NURSING NOTE
Prior to immunization administration, verified patients identity using patient s name and date of birth. Please see Immunization Activity for additional information.     Screening Questionnaire for Adult Immunization    Are you sick today?   No   Do you have allergies to medications, food, a vaccine component or latex?   No   Have you ever had a serious reaction after receiving a vaccination?   No   Do you have a long-term health problem with heart, lung, kidney, or metabolic disease (e.g., diabetes), asthma, a blood disorder, no spleen, complement component deficiency, a cochlear implant, or a spinal fluid leak?  Are you on long-term aspirin therapy?   No   Do you have cancer, leukemia, HIV/AIDS, or any other immune system problem?   No   Do you have a parent, brother, or sister with an immune system problem?   No   In the past 3 months, have you taken medications that affect  your immune system, such as prednisone, other steroids, or anticancer drugs; drugs for the treatment of rheumatoid arthritis, Crohn s disease, or psoriasis; or have you had radiation treatments?   No   Have you had a seizure, or a brain or other nervous system problem?   No   During the past year, have you received a transfusion of blood or blood    products, or been given immune (gamma) globulin or antiviral drug?   No   For women: Are you pregnant or is there a chance you could become       pregnant during the next month?   No   Have you received any vaccinations in the past 4 weeks?   No     Immunization questionnaire answers were all negative.        Per orders of Dr. Lazo, injection of Tdap and flu given by Peg Wallace MA. Patient instructed to remain in clinic for 15 minutes afterwards, and to report any adverse reaction to me immediately.       Screening performed by Peg Wallace MA on 9/17/2021 at 10:45 AM.

## 2021-10-09 PROBLEM — R05.3 CHRONIC COUGH: Status: ACTIVE | Noted: 2021-10-09

## 2021-10-09 PROBLEM — R35.1 BPH ASSOCIATED WITH NOCTURIA: Status: ACTIVE | Noted: 2021-10-09

## 2021-10-11 ENCOUNTER — NURSE TRIAGE (OUTPATIENT)
Dept: FAMILY MEDICINE | Facility: CLINIC | Age: 61
End: 2021-10-11

## 2021-10-11 NOTE — TELEPHONE ENCOUNTER
Pt calling on status of message to provider.     Pt has been offered to talk with nurses again. Pt refused.    Pt has been scheduled for a virtual visit for [facility] at 9am, upon seeing notes that he would not speak to another provider. Pt has been advised that provider is with patients at the moment, and pt seemed to acknowledge or understand.    Please advise on pt's current state.    Pt has given wife verbal authorization to discuss current condition. Pt was advised that authorization verbal is only good for 24 hours, and permanent consent to communicate with wife can be signed.    Pt is not refusing to go to emergency room, but pt is not in constant pain. Pain is 10 out of 10 with movement.     Pt states is peeing clear and drinking lots of water. No issues going to bathroom.    Side is where pain is sharp. Discomfort is in the abdominal muscles.    Marychuy Yusuf,   [facility]

## 2021-10-11 NOTE — TELEPHONE ENCOUNTER
S-(situation): Patient complains of bilateral hip pain    B-(background): Patient reports pain in both hips since starting Tamsulosin 9/17/21.  When asked if he was having pelvic pain patient responds no but that he has  bladder pain near both hips that has worsened over the past 2 days.   A-(assessment): Pain is constant and worsens with exertion such as sitting up to get out of bed or when he coughs.  Rates pain 10 out of 10 with movement or exertions otherwise rates pain 4-5 out of 10 at rest.  He denies dysuria, fever, nausea/vomiting or back pain.  Gets up 3-4 times every night to urinate.      R-(recommendations): Advised patient to go to ER for pain 10 out of 10.  Patient then states his pain is not always a 10 and that he wants to be seen only by Dr. Lazo.  He would not accept an appointment with another provider.  LARY Howard R.N.

## 2021-10-12 ENCOUNTER — VIRTUAL VISIT (OUTPATIENT)
Dept: FAMILY MEDICINE | Facility: CLINIC | Age: 61
End: 2021-10-12
Payer: COMMERCIAL

## 2021-10-12 ENCOUNTER — LAB (OUTPATIENT)
Dept: LAB | Facility: CLINIC | Age: 61
End: 2021-10-12
Payer: COMMERCIAL

## 2021-10-12 DIAGNOSIS — M25.551 BILATERAL HIP PAIN: ICD-10-CM

## 2021-10-12 DIAGNOSIS — M54.14 THORACIC RADICULOPATHY: ICD-10-CM

## 2021-10-12 DIAGNOSIS — R10.2 SUPRAPUBIC PAIN, ACUTE: Primary | ICD-10-CM

## 2021-10-12 DIAGNOSIS — M25.552 BILATERAL HIP PAIN: ICD-10-CM

## 2021-10-12 DIAGNOSIS — R10.2 SUPRAPUBIC PAIN, ACUTE: ICD-10-CM

## 2021-10-12 DIAGNOSIS — M54.17 LUMBOSACRAL RADICULOPATHY: ICD-10-CM

## 2021-10-12 LAB
ALBUMIN UR-MCNC: NEGATIVE MG/DL
APPEARANCE UR: CLEAR
BASOPHILS # BLD AUTO: 0 10E3/UL (ref 0–0.2)
BASOPHILS NFR BLD AUTO: 0 %
BILIRUB UR QL STRIP: NEGATIVE
COLOR UR AUTO: YELLOW
EOSINOPHIL # BLD AUTO: 0.2 10E3/UL (ref 0–0.7)
EOSINOPHIL NFR BLD AUTO: 3 %
GLUCOSE UR STRIP-MCNC: NEGATIVE MG/DL
GRAN CASTS #/AREA URNS LPF: ABNORMAL /LPF
HGB UR QL STRIP: NEGATIVE
IMM GRANULOCYTES # BLD: 0 10E3/UL
IMM GRANULOCYTES NFR BLD: 0 %
KETONES UR STRIP-MCNC: NEGATIVE MG/DL
LEUKOCYTE ESTERASE UR QL STRIP: NEGATIVE
LYMPHOCYTES # BLD AUTO: 2.5 10E3/UL (ref 0.8–5.3)
LYMPHOCYTES NFR BLD AUTO: 31 %
MONOCYTES # BLD AUTO: 0.5 10E3/UL (ref 0–1.3)
MONOCYTES NFR BLD AUTO: 7 %
MUCOUS THREADS #/AREA URNS LPF: PRESENT /LPF
NEUTROPHILS # BLD AUTO: 4.8 10E3/UL (ref 1.6–8.3)
NEUTROPHILS NFR BLD AUTO: 59 %
NITRATE UR QL: NEGATIVE
PH UR STRIP: 6 [PH] (ref 5–7)
RBC #/AREA URNS AUTO: ABNORMAL /HPF
SP GR UR STRIP: 1.02 (ref 1–1.03)
SQUAMOUS #/AREA URNS AUTO: ABNORMAL /LPF
UROBILINOGEN UR STRIP-ACNC: 0.2 E.U./DL
WBC # BLD AUTO: 8.1 10E3/UL (ref 4–11)
WBC #/AREA URNS AUTO: ABNORMAL /HPF

## 2021-10-12 PROCEDURE — G0103 PSA SCREENING: HCPCS

## 2021-10-12 PROCEDURE — 81001 URINALYSIS AUTO W/SCOPE: CPT

## 2021-10-12 PROCEDURE — 85048 AUTOMATED LEUKOCYTE COUNT: CPT

## 2021-10-12 PROCEDURE — 99214 OFFICE O/P EST MOD 30 MIN: CPT | Mod: TEL | Performed by: INTERNAL MEDICINE

## 2021-10-12 PROCEDURE — 85004 AUTOMATED DIFF WBC COUNT: CPT

## 2021-10-12 PROCEDURE — 36415 COLL VENOUS BLD VENIPUNCTURE: CPT

## 2021-10-12 NOTE — PROGRESS NOTES
Ruddy is a 61 year old who is being evaluated via a billable telephone visit.      What phone number would you like to be contacted at? [phone]  How would you like to obtain your AVS? Davey      [facility] Internal Medicine Progress Note           Assessment and Plan:     Suprapubic pain, acute (primary diagnosis)  - UA with Microscopic reflex to Culture - lab collect; Future  - XR Abdomen 2 Views; Future  - PSA, screen; Future  - WBC with Diff; Future  - US Abdomen Limited; Future  - CT Abdomen Pelvis w Contrast; Future    Lumbosacral radiculopathy  - XR Lumbar Spine 2/3 Views; Future  - XR Thoracic Lumbar Standing 2 Views; Future    Thoracic radiculopathy  - XR Thoracic Lumbar Standing 2 Views; Future    Bilateral hip pain  - XR Pelvis and Hip Bilateral 1 View; Future             Interval History:     Ruddy is a 61 year old who presents for the following health issues     Past 3 days I noticed tenderness around abdomen and hip areas.  Any body movements it hurts. When I lay down and get up in the bed, it hurts.  Pain is only when making movements, I can't tie my shoelaces when I lift my leg.  Not sure if it's a side effect of Flomax.  If I sit down, it seems like the pain goes around my back.  I urinate or cough, I feel the pain below the navel.  No improvement of BPH symptoms.  When I cough, pain is concentrated in the hip and back.                Significant Problems:   Patient Active Problem List   Diagnosis     CARDIOVASCULAR SCREENING; LDL GOAL LESS THAN 130     Headaches, tension-type     Chronic eczema     Essential hypertension     Seasonal allergic rhinitis     Overweight (BMI 25.0-29.9)     Neck pain     Nonallergic rhinitis     Combined forms of age-related cataract, mild, of both eyes     Seborrheic dermatitis     Hyperlipidemia LDL goal <100     Upper airway cough syndrome     BPH associated with nocturia     Chronic cough              Review of Systems:   CONSTITUTIONAL: NEGATIVE for  fever, chills, change in weight  INTEGUMENTARY/SKIN: NEGATIVE for worrisome rashes, moles or lesions  EYES: NEGATIVE for vision changes or irritation  ENT/MOUTH: NEGATIVE for ear, mouth and throat problems  RESP: NEGATIVE for significant cough or SOB  CV: NEGATIVE for chest pain, palpitations or peripheral edema  GI: As above.  : NEGATIVE for frequency, dysuria, or hematuria  MUSCULOSKELETAL:As above.  NEURO: NEGATIVE for weakness, dizziness or paresthesias  ENDOCRINE: NEGATIVE for temperature intolerance, skin/hair changes  HEME: NEGATIVE for bleeding problems  PSYCHIATRIC: NEGATIVE for changes in mood or affect            Medications:     Current Outpatient Medications   Medication Sig     ASPIRIN 81 PO Take 1 tablet by mouth daily     atorvastatin (LIPITOR) 10 MG tablet Take 1 tablet (10 mg) by mouth daily     ketoconazole (NIZORAL) 2 % external cream Apply topically daily     metoprolol succinate ER (TOPROL-XL) 25 MG 24 hr tablet Take 1 tablet (25 mg) by mouth daily     omeprazole (PRILOSEC) 20 MG DR capsule Take 1 capsule (20 mg) by mouth daily     tamsulosin (FLOMAX) 0.4 MG capsule Take 1 capsule (0.4 mg) by mouth At Bedtime     triamterene-HCTZ (DYAZIDE) 37.5-25 MG capsule TAKE 1 CAPSULE BY MOUTH EVERY DAY IN THE MORNING     No current facility-administered medications for this visit.             Physical Exam:   Vital signs not obtained due to nature of visit.    Physical exam not performed due to nature of visit.          Data:   Epic reviewed.       Disposition:  Follow-up in 4 weeks.    Berry Lazo MD  Internal Medicine  [facility] Team    Phone call duration: 21 minutes  Start: 9:27 AM  End: 9:48 AM

## 2021-10-13 ENCOUNTER — ANCILLARY PROCEDURE (OUTPATIENT)
Dept: ULTRASOUND IMAGING | Facility: CLINIC | Age: 61
End: 2021-10-13
Attending: INTERNAL MEDICINE
Payer: COMMERCIAL

## 2021-10-13 ENCOUNTER — ANCILLARY PROCEDURE (OUTPATIENT)
Dept: GENERAL RADIOLOGY | Facility: CLINIC | Age: 61
End: 2021-10-13
Attending: INTERNAL MEDICINE
Payer: COMMERCIAL

## 2021-10-13 DIAGNOSIS — R10.2 SUPRAPUBIC PAIN, ACUTE: ICD-10-CM

## 2021-10-13 LAB — PSA SERPL-MCNC: 1.36 UG/L (ref 0–4)

## 2021-10-13 PROCEDURE — 74019 RADEX ABDOMEN 2 VIEWS: CPT | Performed by: RADIOLOGY

## 2021-10-13 PROCEDURE — 76705 ECHO EXAM OF ABDOMEN: CPT

## 2021-10-15 ENCOUNTER — MYC MEDICAL ADVICE (OUTPATIENT)
Dept: FAMILY MEDICINE | Facility: CLINIC | Age: 61
End: 2021-10-15

## 2021-10-18 ENCOUNTER — ANCILLARY PROCEDURE (OUTPATIENT)
Dept: CT IMAGING | Facility: CLINIC | Age: 61
End: 2021-10-18
Attending: INTERNAL MEDICINE
Payer: COMMERCIAL

## 2021-10-18 DIAGNOSIS — R10.2 SUPRAPUBIC PAIN, ACUTE: ICD-10-CM

## 2021-10-18 PROCEDURE — 74177 CT ABD & PELVIS W/CONTRAST: CPT | Performed by: RADIOLOGY

## 2021-10-18 RX ORDER — IOPAMIDOL 755 MG/ML
79 INJECTION, SOLUTION INTRAVASCULAR ONCE
Status: COMPLETED | OUTPATIENT
Start: 2021-10-18 | End: 2021-10-18

## 2021-10-18 RX ADMIN — IOPAMIDOL 79 ML: 755 INJECTION, SOLUTION INTRAVASCULAR at 13:25

## 2021-10-18 NOTE — TELEPHONE ENCOUNTER
Routing to provider to review and advice. Patient is questioning about lab results from 10/12.     Jesenia Jones RN  [facility]

## 2021-10-19 ENCOUNTER — ANCILLARY PROCEDURE (OUTPATIENT)
Dept: GENERAL RADIOLOGY | Facility: CLINIC | Age: 61
End: 2021-10-19
Payer: COMMERCIAL

## 2021-10-19 ENCOUNTER — ANCILLARY PROCEDURE (OUTPATIENT)
Dept: GENERAL RADIOLOGY | Facility: CLINIC | Age: 61
End: 2021-10-19
Attending: INTERNAL MEDICINE
Payer: COMMERCIAL

## 2021-10-19 DIAGNOSIS — M25.551 BILATERAL HIP PAIN: ICD-10-CM

## 2021-10-19 DIAGNOSIS — M54.17 LUMBOSACRAL RADICULOPATHY: ICD-10-CM

## 2021-10-19 DIAGNOSIS — M54.14 THORACIC RADICULOPATHY: ICD-10-CM

## 2021-10-19 DIAGNOSIS — M25.552 BILATERAL HIP PAIN: ICD-10-CM

## 2021-10-19 PROCEDURE — 72080 X-RAY EXAM THORACOLMB 2/> VW: CPT | Performed by: RADIOLOGY

## 2021-10-19 PROCEDURE — 73522 X-RAY EXAM HIPS BI 3-4 VIEWS: CPT | Performed by: RADIOLOGY

## 2021-11-04 DIAGNOSIS — R05.3 CHRONIC COUGH: Primary | ICD-10-CM

## 2021-11-30 ENCOUNTER — OFFICE VISIT (OUTPATIENT)
Dept: UROLOGY | Facility: CLINIC | Age: 61
End: 2021-11-30
Payer: COMMERCIAL

## 2021-11-30 DIAGNOSIS — R39.15 URINARY URGENCY: ICD-10-CM

## 2021-11-30 DIAGNOSIS — N40.1 BPH ASSOCIATED WITH NOCTURIA: Primary | ICD-10-CM

## 2021-11-30 DIAGNOSIS — R35.1 BPH ASSOCIATED WITH NOCTURIA: Primary | ICD-10-CM

## 2021-11-30 PROCEDURE — 99204 OFFICE O/P NEW MOD 45 MIN: CPT | Performed by: UROLOGY

## 2021-11-30 RX ORDER — ALFUZOSIN HYDROCHLORIDE 10 MG/1
10 TABLET, EXTENDED RELEASE ORAL DAILY
Qty: 90 TABLET | Refills: 3 | Status: SHIPPED | OUTPATIENT
Start: 2021-11-30 | End: 2022-02-10

## 2021-11-30 NOTE — PROGRESS NOTES
"Urology Consult History and Physical  [facility]   Name: Ruddy Aaron    MRN: [id number]   YOB: 1960       We were asked to see Ruddy Aaron at the request of Dr. Lazo for evaluation and treatment of LUTS.        Chief Complaint:   LUTS    History is obtained from the patient            History of Present Illness:   Ruddy Aaron is a 61 year old male who is being seen for evaluation of LUTS    Having strong urges to urinate every 1-2 hours during the day  Overnight, needing to void every 3-4 hours  Strong urges, at times will need to pee outside before going into his house  Drinks 2 cups of tea - one in the morning and one at night  Otherwise he drinks water    He was started on tamsulosin 0.4mg (Flomax) and he developed some abdominal pain  This was worse with movement, but also \"bladder tenderness\"  He stopped the tamsulosin 0.4mg (Flomax) and the pain resolved, still very mild tenderness     AUASS: 3-4-7-5-0-0-5 = 15  QOL = 3  PVR = 37    He works second shift for [employer]     No family history of prostate issues           Past Medical History:     Past Medical History:   Diagnosis Date     Combined forms of age-related cataract, mild, of both eyes 6/16/2018     Hypertension goal BP (blood pressure) < 140/90 9/27/2011     Seasonal allergic rhinitis 9/27/2011     Tension type headache, unspecified             Past Surgical History:     Past Surgical History:   Procedure Laterality Date     VASECTOMY              Social History:     Social History     Tobacco Use     Smoking status: Never Smoker     Smokeless tobacco: Never Used   Substance Use Topics     Alcohol use: Yes     Alcohol/week: 1.0 standard drink     Types: 1 Standard drinks or equivalent per week     Comment: ocassional       History   Smoking Status     Never Smoker   Smokeless Tobacco     Never Used            Family History:     Family History   Problem Relation Age of Onset     Hypertension Father      Eye Disorder Mother  "     Eye Disorder Other         mother's side of family has cataracts, and glaucoma?     Asthma No family hx of      C.A.D. No family hx of      Diabetes No family hx of      Cerebrovascular Disease No family hx of      Breast Cancer No family hx of      Cancer - colorectal No family hx of      Prostate Cancer No family hx of               Allergies:     Allergies   Allergen Reactions     Lisinopril Cough            Medications:     Current Outpatient Medications   Medication Sig     ASPIRIN 81 PO Take 1 tablet by mouth daily     atorvastatin (LIPITOR) 10 MG tablet Take 1 tablet (10 mg) by mouth daily     ketoconazole (NIZORAL) 2 % external cream Apply topically daily     metoprolol succinate ER (TOPROL-XL) 25 MG 24 hr tablet Take 1 tablet (25 mg) by mouth daily     omeprazole (PRILOSEC) 20 MG DR capsule Take 1 capsule (20 mg) by mouth daily     triamterene-HCTZ (DYAZIDE) 37.5-25 MG capsule TAKE 1 CAPSULE BY MOUTH EVERY DAY IN THE MORNING     tamsulosin (FLOMAX) 0.4 MG capsule Take 1 capsule (0.4 mg) by mouth At Bedtime (Patient not taking: Reported on 11/30/2021)     No current facility-administered medications for this visit.             Review of Systems:     Skin: negative  Eyes: negative  Ears/Nose/Throat: negative  Respiratory: No shortness of breath, dyspnea on exertion, cough, or hemoptysis  Cardiovascular: negative  Gastrointestinal: negative  Genitourinary: as above  Musculoskeletal: negative  Neurologic: negative  Psychiatric: negative  Hematologic/Lymphatic/Immunologic: negative  Endocrine: negative          Physical Exam:   No data found.  There is no height or weight on file to calculate BMI.     General: age-appropriate appearing male in NAD  HEENT: Head AT/NC, EOMI, CN Grossly intact  Lungs: no respiratory distress, or pursed lip breathing  Heart: No obvious jugular venous distension present  Back: no bony midline tenderness, no CVAT bilaterally.  Abdomen: soft, NON-distended, NON-tender. No  organomegaly  Lymph: no palpable inguinal lymphadenopathy.  LE: no edema.   Musculoskeltal: extremities normal, no peripheral edema  Skin: no suspicious lesions or rashes  Neuro: Alert, oriented, speech and mentation normal;  moving all 4 extremities equally.  Psych: affect and mood normal          Data:   All laboratory data reviewed:    UA RESULTS:  Recent Labs   Lab Test 10/12/21  1137   COLOR Yellow   APPEARANCE Clear   URINEGLC Negative   URINEBILI Negative   URINEKETONE Negative   SG 1.020   UBLD Negative   URINEPH 6.0   PROTEIN Negative   UROBILINOGEN 0.2   NITRITE Negative   LEUKEST Negative   RBCU 0-2   WBCU 0-5      PSA   Date Value Ref Range Status   06/23/2015 1.10 0 - 4 ug/L Final     Prostate Specific Antigen Screen   Date Value Ref Range Status   10/12/2021 1.36 0.00 - 4.00 ug/L Final      Lab Results   Component Value Date    CR 1.15 02/10/2021      IMAGING:  All pertinent imaging reviewed:    All imaging studies reviewed by me.  I personally reviewed these imaging films.  A formal report from radiology will follow.    CT ABD/PEL 10/18/21:  Findings:      Abdomen and pelvis: Hepatic parenchyma is diffusely hypoattenuating  with focal fatty sparing along the gallbladder fossa. Multiple tiny  hypoattenuating lesions, too small to characterize. Gallbladder is  partially dilated with mild wall thickening of the gallbladder fundus.  No pericholecystic fluid. No cholelithiasis. No intra- or extrahepatic  biliary dilation. Spleen, pancreas, and adrenal glands are  unremarkable. Kidneys demonstrate symmetric enhancement without solid  lesions, hydronephrosis, or nephrolithiasis. Ureters and urinary  bladder are unremarkable. Prostatomegaly.     Small hiatal hernia. Stomach and small intestine are otherwise  unremarkable. Colonic diverticulosis without evidence of acute  diverticulitis. Appendix is visualized and normal in caliber.      No suspicious abdominal or pelvic lymphadenopathy. No free fluid.  No  pneumoperitoneum.     Abdominal aorta is normal in caliber and patent. Celiac and mesenteric  artery origins are unremarkable. Portal veins and IVC are patent.     Lower thorax: Unremarkable.     Bones and soft tissues: No acute or suspicious osseous abnormality.  Soft tissue is unremarkable.                                                                      Impression:   1. Hepatic steatosis  2. Persistent focal gallbladder wall thickening as seen on ultrasound  10/13/2021, nonspecific, possibly focal asymmetric adenomyomatosis in  an asymptomatic patient. Recommend 6 month follow-up abdominal  ultrasound as previously recommended.             Impression and Plan:   Impression:   61-year-old man with BPH and LUTS      Plan:   BPH and LUTS  -We discussed the pathophysiology of the bladder and the prostate and the normal changes associated with development of BPH LUTS  -I reviewed his CT scan from October and reviewed the images personally.  I also shared these images with the patient.  He does have evidence of prostatomegaly with a protruding median lobe distorting the bladder neck and the base of the bladder.  -We discussed first-line treatment options would be an alpha-blocker.  He developed a mild possible reaction to tamsulosin and so we discussed a trial of alfuzosin and prescription sent to the pharmacy.  We discussed that should he develop return of his abdominal pain that he should notify our office immediately  -We also discussed that other medication options would include an OAB type medication to target his OAB symptoms as well as possible finasteride given his significant prostatomegaly  -We did discuss that should medications fail to address his issue adequately then we may need to consider a bladder outlet procedure  -Follow-up in 2 months for symptom check     Thank you for the kind consultation.    Time spent: 20 minutes spent on the date of the encounter doing chart review, history and exam,  documentation and further activities as noted above.    Alex White MD   Urology  [facility]  [facility] Phone: [phone]  [facility] Phone: [phone]

## 2021-11-30 NOTE — NURSING NOTE
Ruddy Aaron's goals for this visit include:   Chief Complaint   Patient presents with     New Patient     BPH with nocturia       He requests these members of his care team be copied on today's visit information:     PCP: Berry Lazo    Referring Provider:  Berry Lazo MD  [id number] [place] [address]    There were no vitals taken for this visit.    Do you need any medication refills at today's visit?     post void residual = 37 ml    Polina Rogers LPN on 11/30/2021 at 8:04 AM       Clothing

## 2021-12-06 ENCOUNTER — OFFICE VISIT (OUTPATIENT)
Dept: PULMONOLOGY | Facility: CLINIC | Age: 61
End: 2021-12-06
Payer: COMMERCIAL

## 2021-12-06 ENCOUNTER — ANCILLARY PROCEDURE (OUTPATIENT)
Dept: GENERAL RADIOLOGY | Facility: CLINIC | Age: 61
End: 2021-12-06
Payer: COMMERCIAL

## 2021-12-06 ENCOUNTER — OFFICE VISIT (OUTPATIENT)
Dept: NURSING | Facility: CLINIC | Age: 61
End: 2021-12-06
Payer: COMMERCIAL

## 2021-12-06 VITALS — WEIGHT: 197.9 LBS | OXYGEN SATURATION: 97 % | HEART RATE: 85 BPM | BODY MASS INDEX: 28.33 KG/M2 | HEIGHT: 70 IN

## 2021-12-06 VITALS
HEIGHT: 71 IN | BODY MASS INDEX: 27.58 KG/M2 | TEMPERATURE: 97 F | WEIGHT: 197 LBS | RESPIRATION RATE: 14 BRPM | DIASTOLIC BLOOD PRESSURE: 77 MMHG | SYSTOLIC BLOOD PRESSURE: 118 MMHG | OXYGEN SATURATION: 96 % | HEART RATE: 78 BPM

## 2021-12-06 DIAGNOSIS — R05.3 CHRONIC COUGH: ICD-10-CM

## 2021-12-06 DIAGNOSIS — R05.3 CHRONIC COUGH: Primary | ICD-10-CM

## 2021-12-06 LAB
DLCOUNC-%PRED-PRE: 85 %
DLCOUNC-PRE: 23.57 ML/MIN/MMHG
DLCOUNC-PRED: 27.59 ML/MIN/MMHG
ERV-%PRED-PRE: 56 %
ERV-PRE: 0.64 L
ERV-PRED: 1.13 L
EXPTIME-PRE: 7 SEC
FEF2575-%PRED-PRE: 83 %
FEF2575-PRE: 2.41 L/SEC
FEF2575-PRED: 2.87 L/SEC
FEFMAX-%PRED-PRE: 80 %
FEFMAX-PRE: 7.48 L/SEC
FEFMAX-PRED: 9.24 L/SEC
FEV1-%PRED-PRE: 92 %
FEV1-PRE: 3.09 L
FEV1FEV6-PRE: 75 %
FEV1FEV6-PRED: 79 %
FEV1FVC-PRE: 75 %
FEV1FVC-PRED: 79 %
FEV1SVC-PRE: 73 %
FEV1SVC-PRED: 67 %
FIFMAX-PRE: 3.63 L/SEC
FRCPLETH-%PRED-PRE: 95 %
FRCPLETH-PRE: 3.42 L
FRCPLETH-PRED: 3.59 L
FVC-%PRED-PRE: 96 %
FVC-PRE: 4.1 L
FVC-PRED: 4.27 L
IC-%PRED-PRE: 93 %
IC-PRE: 3.59 L
IC-PRED: 3.84 L
RVPLETH-%PRED-PRE: 116 %
RVPLETH-PRE: 2.78 L
RVPLETH-PRED: 2.39 L
TLCPLETH-%PRED-PRE: 99 %
TLCPLETH-PRE: 7.01 L
TLCPLETH-PRED: 7.08 L
VA-%PRED-PRE: 97 %
VA-PRE: 6.34 L
VC-%PRED-PRE: 85 %
VC-PRE: 4.23 L
VC-PRED: 4.97 L

## 2021-12-06 PROCEDURE — 94726 PLETHYSMOGRAPHY LUNG VOLUMES: CPT | Performed by: INTERNAL MEDICINE

## 2021-12-06 PROCEDURE — 71046 X-RAY EXAM CHEST 2 VIEWS: CPT | Mod: GC | Performed by: RADIOLOGY

## 2021-12-06 PROCEDURE — 94729 DIFFUSING CAPACITY: CPT | Performed by: INTERNAL MEDICINE

## 2021-12-06 PROCEDURE — 94375 RESPIRATORY FLOW VOLUME LOOP: CPT | Performed by: INTERNAL MEDICINE

## 2021-12-06 PROCEDURE — 99204 OFFICE O/P NEW MOD 45 MIN: CPT | Mod: 25 | Performed by: INTERNAL MEDICINE

## 2021-12-06 RX ORDER — TAMSULOSIN HYDROCHLORIDE 0.4 MG/1
0.4 CAPSULE ORAL DAILY
COMMUNITY
End: 2022-02-10

## 2021-12-06 RX ORDER — FLUTICASONE PROPIONATE 50 MCG
1 SPRAY, SUSPENSION (ML) NASAL DAILY
Qty: 16 G | Refills: 3 | Status: SHIPPED | OUTPATIENT
Start: 2021-12-06 | End: 2022-01-27

## 2021-12-06 RX ORDER — FLUTICASONE PROPIONATE AND SALMETEROL XINAFOATE 115; 21 UG/1; UG/1
2 AEROSOL, METERED RESPIRATORY (INHALATION) 2 TIMES DAILY
Qty: 12 G | Refills: 0 | Status: SHIPPED | OUTPATIENT
Start: 2021-12-06 | End: 2022-01-02

## 2021-12-06 ASSESSMENT — MIFFLIN-ST. JEOR
SCORE: 1714.95
SCORE: 1730.72

## 2021-12-06 ASSESSMENT — PAIN SCALES - GENERAL: PAINLEVEL: NO PAIN (0)

## 2021-12-06 NOTE — RESULT ENCOUNTER NOTE
Results discussed directly with patient while patient was present. Any further details documented in the note.   Jair Mills MD

## 2021-12-06 NOTE — PROGRESS NOTES
Pulmonary Clinic New Patient Consult  Reason for Consult: Chronic cough  History of Present Illness  I had a pleasure of seeing Ruddy Aaron, who is a 61-year-old male who presents to clinic for an evaluation of a chronic cough which been going on for about 9 months.  To briefly review, Ruddy complains of a chronic cough which has remained persistent.  Cough is mostly dry but rarely can be productive of thick greenish sputum.  Cough appears to be brought on by talking and is usually absent at night while sleeping.  He denies any shortness of breath, no wheezing and no chest tightness.  Cough does not appear to be related to warmth, temperature or humidity.  He was initially seen by his PCP-Dr. Lazo for the symptoms and it was thought to be related to lisinopril which was stopped.  However, the cough continue to remain persistent.  He was seen by Dr. Pope with ENT who performed a nasal endoscopy which was unremarkable with no evidence of significant sinusitis all vocal cord dysfunction.  He was thought to have possibly reflux disease and was treated with 20 mg of omeprazole for several months with no improvement.  Ruddy denies any history of childhood asthma, he denies any significant reflux symptoms, he denies any sinus symptoms of nasal drainage.  He does have very significant seasonal allergies occasionally particularly during the fall season which is usually associated with itchiness of his eyes for which he had taking Flonase in the past.  He thinks he may be allergic to dust mites and pollens.   Never smoked and he is an  for 5 years. He also worked as a  and he is exposed to fuel fumes. No hx of lung cancer in the family. No COPD. No flooding or mold infestation. He has a pet for 15 years- a dog and he is not allergic to it.  Review of Systems:  10 of 14 systems reviewed and are negative unless otherwise stated in HPI.    Past Medical History:   Diagnosis Date     Combined forms of  age-related cataract, mild, of both eyes 6/16/2018     Hypertension goal BP (blood pressure) < 140/90 9/27/2011     Seasonal allergic rhinitis 9/27/2011     Tension type headache, unspecified        Past Surgical History:   Procedure Laterality Date     VASECTOMY         Family History   Problem Relation Age of Onset     Hypertension Father      Eye Disorder Mother      Eye Disorder Other         mother's side of family has cataracts, and glaucoma?     Asthma No family hx of      C.A.D. No family hx of      Diabetes No family hx of      Cerebrovascular Disease No family hx of      Breast Cancer No family hx of      Cancer - colorectal No family hx of      Prostate Cancer No family hx of        Social History     Socioeconomic History     Marital status:      Spouse name: None     Number of children: 3     Years of education: None     Highest education level: None   Occupational History     Employer: [employer]   Tobacco Use     Smoking status: Never Smoker     Smokeless tobacco: Never Used   Substance and Sexual Activity     Alcohol use: Yes     Alcohol/week: 1.0 standard drink     Types: 1 Standard drinks or equivalent per week     Comment: ocassional     Drug use: No     Sexual activity: Yes     Partners: Female   Other Topics Concern     Parent/sibling w/ CABG, MI or angioplasty before 65F 55M? No   Social History Narrative     None     Social Determinants of Health     Financial Resource Strain: Not on file   Food Insecurity: Not on file   Transportation Needs: Not on file   Physical Activity: Not on file   Stress: Not on file   Social Connections: Not on file   Intimate Partner Violence: Not on file   Housing Stability: Not on file         Allergies   Allergen Reactions     Lisinopril Cough         Current Outpatient Medications:      ASPIRIN 81 PO, Take 1 tablet by mouth daily, Disp: , Rfl:      atorvastatin (LIPITOR) 10 MG tablet, Take 1 tablet (10 mg) by mouth daily, Disp: 90 tablet, Rfl:  "3     ketoconazole (NIZORAL) 2 % external cream, Apply topically daily, Disp: 30 g, Rfl: 11     metoprolol succinate ER (TOPROL-XL) 25 MG 24 hr tablet, Take 1 tablet (25 mg) by mouth daily, Disp: 90 tablet, Rfl: 3     tamsulosin (FLOMAX) 0.4 MG capsule, Take 0.4 mg by mouth daily, Disp: , Rfl:      triamterene-HCTZ (DYAZIDE) 37.5-25 MG capsule, TAKE 1 CAPSULE BY MOUTH EVERY DAY IN THE MORNING, Disp: 90 capsule, Rfl: 3     alfuzosin ER (UROXATRAL) 10 MG 24 hr tablet, Take 1 tablet (10 mg) by mouth daily (Patient not taking: Reported on 12/6/2021), Disp: 90 tablet, Rfl: 3     omeprazole (PRILOSEC) 20 MG DR capsule, Take 1 capsule (20 mg) by mouth daily (Patient not taking: Reported on 12/6/2021), Disp: 30 capsule, Rfl: 3      Physical Exam:  /77   Pulse 78   Temp 97  F (36.1  C)   Resp 14   Ht 1.803 m (5' 11\")   Wt 89.4 kg (197 lb)   SpO2 96%   BMI 27.48 kg/m    GENERAL: Well developed, well nourished, alert, and in no apparent distress.  HEENT: Normocephalic, atraumatic. PERRL, EOMI. Oral mucosa is moist. No perioral cyanosis.  NECK: supple, no masses, no thyromegaly.  RESP:  Normal respiratory effort.  CTAB.  No rales, wheezes, rhonchi.  No cyanosis or clubbing.  CV: Normal S1, S2, regular rhythm, normal rate. No murmur.  No LE edema.   ABDOMEN:  Soft, non-tender, non-distended.   SKIN: warm and dry. No rash.  NEURO: AAOx3.  Normal gait.  Fluent speech.  PSYCH: mentation appears normal.       Results:  PFTs: Reviewed and discussed with patient.  Normal airflow, normal lung volumes, normal flow volume loops and preserved diffusion capacity  Most Recent [facility] Pulmonary Function Testing    FVC-Pred   Date Value Ref Range Status   12/06/2021 4.27 L      FVC-Pre   Date Value Ref Range Status   12/06/2021 4.10 L      FVC-%Pred-Pre   Date Value Ref Range Status   12/06/2021 96 %      FEV1-Pre   Date Value Ref Range Status   12/06/2021 3.09 L      FEV1-%Pred-Pre   Date Value Ref Range Status   12/06/2021 92 %  " "    FEV1FVC-Pred   Date Value Ref Range Status   12/06/2021 79 %      FEV1FVC-Pre   Date Value Ref Range Status   12/06/2021 75 %      No results found for: 20029  FEFMax-Pred   Date Value Ref Range Status   12/06/2021 9.24 L/sec      FEFMax-Pre   Date Value Ref Range Status   12/06/2021 7.48 L/sec      FEFMax-%Pred-Pre   Date Value Ref Range Status   12/06/2021 80 %      ExpTime-Pre   Date Value Ref Range Status   12/06/2021 7.00 sec      FIFMax-Pre   Date Value Ref Range Status   12/06/2021 3.63 L/sec      FEV1FEV6-Pred   Date Value Ref Range Status   12/06/2021 79 %      FEV1FEV6-Pre   Date Value Ref Range Status   12/06/2021 75 %      No results found for: 20055  Imaging (personally reviewed in clinic today):  XRAY:  Chest 2 views:  Personally reviewed by me - no airspace, soft tissue, cardiac or bony abnormalities.      Assessment and Plan:   Chronic cough  Spirometric and radiologic evaluations are unremarkable.  PFTs do not show any evidence of obstruction and chest x-ray has no evidence of airspace or airway disease.   We discussed that in the setting of normal chest x-ray, non-smoking status, that the most common causes of chronic cough includes reactive airway disease, rhinosinusitis and reflux disease.  He does not seem to have significant reflux disease and moreover he has been treated with omeprazole for several months without any improvement in his cough.  He has significant underlying seasonal allergies although does not seem to have any current active symptoms such as itchy eyes.  It is reasonable to treat him with an inhaled corticosteroid as well as nasal corticosteroids to see if he derives any benefit - \" modified kitchen sink approach\".  Prescriptions for Advair as well as Flonase were given today in clinic and he knows to rinse his mouth after each use of his inhaler.  Given that his cough tends to occur during periods of talking only, empiric treatment with speech therapy may be helpful for " undiagnosed paradoxical vocal cord movements.  We will consider this if the above medications do not appear to be working or helpful.  We could also try medication such as gabapentin or amitriptyline to treat neurogenic cough in the future  Questions and concerns were answered to the patient's satisfaction.  he was provided with my contact information should new questions or concerns arise in the interim.  he should return to clinic in 3 months  Up to date on vaccinations  I spent a total of 60 minutes face to face with Ruddy Aaron during today's office visit. Over 50% of this time was spent counseling the patient and/or coordinating care regarding their pulmonary disease.      Jair Mills MD  Pulmonary, Critical Care and Sleep Medicine  [facility]  Pager: [phone]        The above note was dictated using voice recognition software and may include typographical errors. Please contact the author for any clarifications.

## 2021-12-06 NOTE — PATIENT INSTRUCTIONS
Patient Education   Advair Diskus Inhaler  Medicines: Fluticasone (rzqq-ADS-w-sone) and Salmeterol (sal-ZUNILDA-ter-ahl)  What it does  This inhaler contains two medicines that relax the muscles in your airway. They also decrease inflammation, swelling and mucus.  Use every day to prevent symptoms, even if you feel well. Do not use for fast relief.     How to use this inhaler  1. Wash and dry your hands well.  2. Open the inhaler. Hold it in a level, flat position. Do not shake this inhaler.  3. Slide the lever from left to right until it clicks.  4. Exhale (breathe out) through mouth away from inhaler.  5. Place the mouthpiece into your mouth and close your lips tightly around it.  6. Keep the inhaler level while you inhale with a quick, deep breath.  7. Remove the mouthpiece and hold your breath for 10 seconds or as long as you can.  8. Breathe out through your mouth slowly and away from the inhaler. Close the inhaler.  This inhaler contains Salmeterol, a LABA class medicine.  Do not use with other LABA containing medicines.  9. Rinse your mouth or brush your teeth and spit out the water. Do not swallow.  Cleaning    Wipe the outside with dry tissue or cloth.    Do not wash any part of the inhaler. Keep dry.    Store inhaler in a cool, dry place.  How to tell if the inhaler is empty  The inhaler has no more doses when the counter reads 0. Numbers 5 to 0 are red to remind you to get a refill.  If you have questions about the use of your inhaler, please ask your pharmacist or provider.  For more information and video demos, go to [email or web address].  For informational purposes only. Not to replace the advice of your health care provider.  Copyright   2013 [facility]. All rights reserved. [employer] [id number] - REV 12/15.

## 2021-12-21 ENCOUNTER — LAB (OUTPATIENT)
Dept: URGENT CARE | Facility: URGENT CARE | Age: 61
End: 2021-12-21
Attending: INTERNAL MEDICINE
Payer: COMMERCIAL

## 2021-12-21 ENCOUNTER — E-VISIT (OUTPATIENT)
Dept: FAMILY MEDICINE | Facility: CLINIC | Age: 61
End: 2021-12-21
Payer: COMMERCIAL

## 2021-12-21 DIAGNOSIS — Z20.822 SUSPECTED COVID-19 VIRUS INFECTION: ICD-10-CM

## 2021-12-21 DIAGNOSIS — Z20.822 SUSPECTED 2019 NOVEL CORONAVIRUS INFECTION: ICD-10-CM

## 2021-12-21 DIAGNOSIS — R68.89 FLU-LIKE SYMPTOMS: Primary | ICD-10-CM

## 2021-12-21 DIAGNOSIS — R68.89 FLU-LIKE SYMPTOMS: ICD-10-CM

## 2021-12-21 LAB
FLUAV AG SPEC QL IA: NEGATIVE
FLUBV AG SPEC QL IA: NEGATIVE

## 2021-12-21 PROCEDURE — U0003 INFECTIOUS AGENT DETECTION BY NUCLEIC ACID (DNA OR RNA); SEVERE ACUTE RESPIRATORY SYNDROME CORONAVIRUS 2 (SARS-COV-2) (CORONAVIRUS DISEASE [COVID-19]), AMPLIFIED PROBE TECHNIQUE, MAKING USE OF HIGH THROUGHPUT TECHNOLOGIES AS DESCRIBED BY CMS-2020-01-R: HCPCS

## 2021-12-21 PROCEDURE — U0005 INFEC AGEN DETEC AMPLI PROBE: HCPCS

## 2021-12-21 PROCEDURE — 87804 INFLUENZA ASSAY W/OPTIC: CPT

## 2021-12-21 PROCEDURE — 99421 OL DIG E/M SVC 5-10 MIN: CPT | Performed by: INTERNAL MEDICINE

## 2021-12-21 NOTE — PATIENT INSTRUCTIONS
Dear Ruddy Aaron,    Your symptoms show that you may have coronavirus (COVID-19). This illness can cause fever, cough and trouble breathing. Many people get a mild case and get better on their own. Some people can get very sick.    Will I be tested for COVID-19?  We would like to test you for Covid-19 virus. I have placed orders for this test.     To schedule: go to your [employer] home page and scroll down to the section that says  You have an appointment that needs to be scheduled  and click the large green button that says  Schedule Now  and follow the steps to find the next available openings.    If you are unable to complete these [employer] scheduling steps, please call [phone] to schedule your testing.     Return to work/school/ guidance:  Please let your workplace manager and staffing office know when your quarantine ends     We can t give you an exact date as it depends on the above. You can calculate this on your own or work with your manager/staffing office to calculate this. (For example if you were exposed on 10/4, you would have to quarantine for 14 full days. That would be through 10/18. You could return on 10/19.)      If you receive a positive COVID-19 test result, follow the guidance of the those who are giving you the results. Usually the return to work is 10 (or in some cases 20 days from symptom onset.) If you work at [facility], you must also be cleared by Employee Occupational Health and Safety to return to work.        If you receive a negative COVID-19 test result and did not have a high risk exposure to someone with a known positive COVID-19 test, you can return to work once you're free of fever for 24 hours without fever-reducing medication and your symptoms are improving or resolved.      If you receive a negative COVID-19 test and If you had a high risk exposure to someone who has tested positive for COVID-19 then you can return to work 14 days after your last contact  with the positive individual    Note: If you have ongoing exposure to the covid positive person, this quarantine period may be more than 14 days. (For example, if you are continued to be exposed to your child who tested positive and cannot isolate from them, then the quarantine of 7-14 days can't start until your child is no longer contagious. This is typically 10 days from onset of the child's symptoms. So the total duration may be 17-24 days in this case.)    Sign up for [employer].   We know it's scary to hear that you might have COVID-19. We want to track your symptoms to make sure you're okay over the next 2 weeks. Please look for an email from [employer]--this is a free, online program that we'll use to keep in touch. To sign up, follow the link in the email you will receive. Learn more at [email or web address]    How can I take care of myself?    Get lots of rest. Drink extra fluids (unless a doctor has told you not to)    Take Tylenol (acetaminophen) or ibuprofen for fever or pain. If you have liver or kidney problems, ask your family doctor if it's okay to take Tylenol o ibuprofen    If you have other health problems (like cancer, heart failure, an organ transplant or severe kidney disease): Call your specialty clinic if you don't feel better in the next 2 days.    Know when to call 911. Emergency warning signs include:  o Trouble breathing or shortness of breath  o Pain or pressure in the chest that doesn't go away  o Feeling confused like you haven't felt before, or not being able to wake up  o Bluish-colored lips or face    Where can I get more information?  [employer] [place] - About COVID-19:   [email or web address]    CDC - What to Do If You're Sick:   www.cdc.gov/coronavirus/2019-ncov/about/steps-when-sick.html

## 2021-12-22 LAB — SARS-COV-2 RNA RESP QL NAA+PROBE: NEGATIVE

## 2022-01-02 DIAGNOSIS — R05.3 CHRONIC COUGH: ICD-10-CM

## 2022-01-03 RX ORDER — FLUTICASONE PROPIONATE AND SALMETEROL XINAFOATE 115; 21 UG/1; UG/1
AEROSOL, METERED RESPIRATORY (INHALATION)
Qty: 36 G | Refills: 3 | Status: SHIPPED | OUTPATIENT
Start: 2022-01-03 | End: 2023-04-05

## 2022-01-03 NOTE — TELEPHONE ENCOUNTER
Medication:   fluticasone-salmeterol (ADVAIR-HFA) 115-21 MCG/ACT inhaler 12 g 0 12/6/2021  --   Sig - Route: Inhale 2 puffs into the lungs 2 times daily - Inhalation     Date last written: 12/06/2021  Dispensed amount: 12 g  Refills: 0    Pt's last office visit: 12/06/2021  Next scheduled office visit: 03/07/2022      Shania Wing LPN  Pulmonary Medicine:  [facility]  Phone: [phone] Fax: [phone]

## 2022-01-07 ENCOUNTER — MYC MEDICAL ADVICE (OUTPATIENT)
Dept: FAMILY MEDICINE | Facility: CLINIC | Age: 62
End: 2022-01-07
Payer: COMMERCIAL

## 2022-01-09 ENCOUNTER — TELEPHONE (OUTPATIENT)
Dept: FAMILY MEDICINE | Facility: CLINIC | Age: 62
End: 2022-01-09
Payer: COMMERCIAL

## 2022-01-10 ENCOUNTER — ANCILLARY PROCEDURE (OUTPATIENT)
Dept: GENERAL RADIOLOGY | Facility: CLINIC | Age: 62
End: 2022-01-10
Attending: INTERNAL MEDICINE
Payer: COMMERCIAL

## 2022-01-10 DIAGNOSIS — R09.1 PLEURISY: ICD-10-CM

## 2022-01-10 PROCEDURE — 71046 X-RAY EXAM CHEST 2 VIEWS: CPT | Performed by: RADIOLOGY

## 2022-01-10 NOTE — TELEPHONE ENCOUNTER
Reason for call:  Other   Patient called regarding (reason for call): call back  Additional comments: Per patient spouse patient self diagnosed with Covid and has not been at work. Bad cough that hurts ribs. Spouse advised messages have been sent and not response. Declined triage.     Phone number to reach patient:  Home number on file [phone] (home)    Best Time:  Anytime    Can we leave a detailed message on this number?  YES    Travel screening: Not Applicable

## 2022-01-11 ENCOUNTER — VIRTUAL VISIT (OUTPATIENT)
Dept: FAMILY MEDICINE | Facility: CLINIC | Age: 62
End: 2022-01-11
Payer: COMMERCIAL

## 2022-01-11 DIAGNOSIS — R09.1 PLEURISY: ICD-10-CM

## 2022-01-11 DIAGNOSIS — J20.8 ACUTE BRONCHITIS DUE TO COVID-19 VIRUS: Primary | ICD-10-CM

## 2022-01-11 DIAGNOSIS — U07.1 ACUTE BRONCHITIS DUE TO COVID-19 VIRUS: Primary | ICD-10-CM

## 2022-01-11 PROCEDURE — 99214 OFFICE O/P EST MOD 30 MIN: CPT | Mod: TEL | Performed by: INTERNAL MEDICINE

## 2022-01-11 RX ORDER — HYDROCODONE BITARTRATE AND ACETAMINOPHEN 5; 325 MG/1; MG/1
1 TABLET ORAL 2 TIMES DAILY PRN
Qty: 10 TABLET | Refills: 0 | Status: SHIPPED | OUTPATIENT
Start: 2022-01-11 | End: 2022-02-10

## 2022-01-11 RX ORDER — AZITHROMYCIN 250 MG/1
TABLET, FILM COATED ORAL
Qty: 6 TABLET | Refills: 2 | Status: SHIPPED | OUTPATIENT
Start: 2022-01-11 | End: 2022-02-10

## 2022-01-11 NOTE — PROGRESS NOTES
Ruddy is a 61 year old who is being evaluated via a billable telephone visit.      What phone number would you like to be contacted at? [phone]  How would you like to obtain your AVS? Davey ROMERO  Ruddy is a 61 year old male who presents for the following health issues:    Pleurisy/stabbing pain is still here when I cough/flinch or moving.  Greenish phlegm. Taking Mucinex.  Loss of taste last Wednesday and tested positive at work place (home kit).  Off work since January 6, 2022  Received Moderna booster dose last January 3, 2022.      REVIEW OF SYSTEMS  CONSTITUTIONAL: NEGATIVE for fever, chills, change in weight  INTEGUMENTARY/SKIN: NEGATIVE for worrisome rashes, moles or lesions  EYES: NEGATIVE for vision changes or irritation  ENT/MOUTH: NEGATIVE for ear, mouth and throat problems  RESP:POSITIVE for cough-productive and pleurisy and NEGATIVE for hemoptysis and wheezing  CV: NEGATIVE for orthopnea, palpitations and paroxysmal nocturnal dyspnea  GI: POSITIVE for reduced appetite due to loss of taste. NEGATIVE for nausea, abdominal pain, heartburn, or change in bowel habits  : NEGATIVE for frequency, dysuria, or hematuria  MUSCULOSKELETAL: NEGATIVE for significant arthralgias or myalgia  NEURO: NEGATIVE for weakness, dizziness or paresthesias  ENDOCRINE: NEGATIVE for temperature intolerance, skin/hair changes  HEME: NEGATIVE for bleeding problems  PSYCHIATRIC: NEGATIVE for changes in mood or affect      OBJECTIVE   Vitals:  No vitals were obtained today due to virtual visit.  Physical Exam   Remainder of exam unable to be completed due to telephone visits      DIAGNOSTICS  CHEST TWO VIEWS 1/10/2022 10:55 AM      HISTORY: Pleurisy.     COMPARISON: December 6, 2021.                                                                       IMPRESSION: There are no acute infiltrates. The cardiac silhouette is  not enlarged. Pulmonary vasculature is unremarkable.     JESSE TINAJERO MD         ASSESSMENT/PLAN  Acute bronchitis due to COVID-19 virus  Continue Prednisone and take over-the-counter NyQuil plus Tylenol PM.  - azithromycin (ZITHROMAX) 250 MG tablet; Two tablets first day, then one tablet daily for four days.    Pleurisy  Opiates are justified for short-term use due to severe pleuritic pain. Consider CT of chest if pleuritic pain persists due to risk of venous thromboembolism.  - HYDROcodone-acetaminophen (NORCO) 5-325 MG tablet; Take 1 tablet by mouth 2 times daily as needed for pain    Phone call duration: 20 minutes  Start: 9:45 AM  End: 10:05 AM      Berry Lazo MD  Internal Medicine      30 minutes spent on the date of the encounter doing chart review, review of test results, interpretation of tests, patient visit and documentation

## 2022-01-11 NOTE — LETTER
REPORT OF WORK ABILITY    [address] [address]  [phone]      PATIENT DATA    Employee Name: Ruddy Aaron      : 1960     #: [id number]    Today's date: 2022  Date of first visit: 2022    PROVIDER EVALUATION: Please fill in as needed.  Please give copy to employee for employer.    1. Diagnosis: Covid-19 infection                        Acute bronchitis due to Covid-19.    2. Treatment: Rest and pharmacotherapy.  3. Medication: Azithromycin and Prednisone  4. No work from 2022 to January 15, 2022  5. Return to work date: 2022   ** WITH RESTRICTIONS? No restrictions      RESTRICTIONS: Unlimited unless listed.  Restrictions apply to home and leisure also.  If work restrictions is not available, the employee is totally disabled.    Maximum Medical Improvement (Date): 2022 (patient is usually off on Sundays and ).    Any Permanent Partial Disability? 0%    Medical Examiner: Berry Lazo MD          [place] or [id number]    Next appointment: 2 weeks    CC: Employer, Managed Care Plan/Payor, Patient

## 2022-01-12 ENCOUNTER — ANCILLARY PROCEDURE (OUTPATIENT)
Dept: CT IMAGING | Facility: CLINIC | Age: 62
End: 2022-01-12
Attending: INTERNAL MEDICINE
Payer: COMMERCIAL

## 2022-01-12 DIAGNOSIS — R09.1 PLEURISY: ICD-10-CM

## 2022-01-12 PROCEDURE — 71275 CT ANGIOGRAPHY CHEST: CPT | Performed by: RADIOLOGY

## 2022-01-12 RX ORDER — IOPAMIDOL 755 MG/ML
72 INJECTION, SOLUTION INTRAVASCULAR ONCE
Status: COMPLETED | OUTPATIENT
Start: 2022-01-12 | End: 2022-01-12

## 2022-01-12 RX ADMIN — IOPAMIDOL 72 ML: 755 INJECTION, SOLUTION INTRAVASCULAR at 08:07

## 2022-01-27 DIAGNOSIS — R05.3 CHRONIC COUGH: ICD-10-CM

## 2022-01-27 RX ORDER — FLUTICASONE PROPIONATE 50 MCG
SPRAY, SUSPENSION (ML) NASAL
Qty: 48 ML | Refills: 3 | Status: SHIPPED | OUTPATIENT
Start: 2022-01-27 | End: 2023-04-05

## 2022-01-27 NOTE — TELEPHONE ENCOUNTER
Medication:     fluticasone (FLONASE) 50 MCG/ACT nasal spray 16 g 3 12/6/2021  --   Sig - Route: Spray 1 spray into both nostrils daily - Both Nostrils     Date last written: 12/06/2021  Dispensed amount: 16g  Refills: 3        Pt's last office visit: 12/06/2021  Next scheduled office visit: 03/07/2022      Per the RN/LPN medication refill protocol, writer is able to refill this request.     Shania Wing LPN  Pulmonary Medicine:  [facility]  Phone: [phone] Fax: [phone]

## 2022-02-03 ENCOUNTER — OFFICE VISIT (OUTPATIENT)
Dept: UROLOGY | Facility: CLINIC | Age: 62
End: 2022-02-03
Payer: COMMERCIAL

## 2022-02-03 DIAGNOSIS — N40.1 BPH ASSOCIATED WITH NOCTURIA: Primary | ICD-10-CM

## 2022-02-03 DIAGNOSIS — R39.15 URINARY URGENCY: ICD-10-CM

## 2022-02-03 DIAGNOSIS — R35.1 BPH ASSOCIATED WITH NOCTURIA: Primary | ICD-10-CM

## 2022-02-03 PROCEDURE — 99214 OFFICE O/P EST MOD 30 MIN: CPT | Performed by: UROLOGY

## 2022-02-03 PROCEDURE — 51798 US URINE CAPACITY MEASURE: CPT | Performed by: UROLOGY

## 2022-02-03 NOTE — NURSING NOTE
Ruddy Aaron's goals for this visit include:   Chief Complaint   Patient presents with     Follow Up     symptom follow up/BPH       He requests these members of his care team be copied on today's visit information:     PCP: Berry Lazo    Referring Provider:  No referring provider defined for this encounter.    There were no vitals taken for this visit.    Do you need any medication refills at today's visit?     post void residual = 134 ml    Polina Rogers LPN on 2/3/2022 at 9:29 AM

## 2022-02-03 NOTE — PROGRESS NOTES
"[facility] [place]   CHIEF COMPLAINT   It was my pleasure to see Ruddy Aaron who is a 61 year old male for follow-up of BPH with nocturia.      HPI   Ruddy Aaron is a very pleasant 61 year old male    Initially seen 11/30/21:  Ruddy Aaron is a 61 year old male who is being seen for evaluation of LUTS     Having strong urges to urinate every 1-2 hours during the day  Overnight, needing to void every 3-4 hours  Strong urges, at times will need to pee outside before going into his house  Drinks 2 cups of tea - one in the morning and one at night  Otherwise he drinks water     He was started on tamsulosin 0.4mg (Flomax) and he developed some abdominal pain  This was worse with movement, but also \"bladder tenderness\"  He stopped the tamsulosin 0.4mg (Flomax) and the pain resolved, still very mild tenderness      AUASS: 2-0-4-5-0-0-5 = 15  QOL = 3  PVR = 37     He works second shift for [employer]      No family history of prostate issues    TODAY 2/3/22:  He was started on Alfuzosin at last visit  He has been doing well on this with no abdominal pain or issues    AUASS: 0-6-6-5-1-0-3 = 12  QOL = 1  PVR = 34cc    PHYSICAL EXAM  Patient is a 61 year old  male   Vitals: There were no vitals taken for this visit.  There is no height or weight on file to calculate BMI.  General Appearance Adult:   Alert, no acute distress, oriented  HENT: throat/mouth:normal, good dentition  Lungs: no respiratory distress, or pursed lip breathing  Heart: No obvious jugular venous distension present  Abdomen: soft, nontender, no organomegaly or masses  Musculoskeltal: extremities normal, no peripheral edema  Skin: no suspicious lesions or rashes  Neuro: Alert, oriented, speech and mentation normal  Psych: affect and mood normal  Gait: Normal    UA RESULTS:  Recent Labs   Lab Test 10/12/21  1137   COLOR Yellow   APPEARANCE Clear   URINEGLC Negative   URINEBILI Negative   URINEKETONE Negative   SG 1.020   UBLD Negative   URINEPH 6.0   PROTEIN " Negative   UROBILINOGEN 0.2   NITRITE Negative   LEUKEST Negative   RBCU 0-2   WBCU 0-5        PSA   Date Value Ref Range Status   06/23/2015 1.10 0 - 4 ug/L Final     Prostate Specific Antigen Screen   Date Value Ref Range Status   10/12/2021 1.36 0.00 - 4.00 ug/L Final      Creatinine   Date Value Ref Range Status   02/10/2021 1.15 0.66 - 1.25 mg/dL Final        IMAGING:  All pertinent imaging reviewed:     All imaging studies reviewed by me.  I personally reviewed these imaging films.  A formal report from radiology will follow.     CT ABD/PEL 10/18/21:  Findings:      Abdomen and pelvis: Hepatic parenchyma is diffusely hypoattenuating  with focal fatty sparing along the gallbladder fossa. Multiple tiny  hypoattenuating lesions, too small to characterize. Gallbladder is  partially dilated with mild wall thickening of the gallbladder fundus.  No pericholecystic fluid. No cholelithiasis. No intra- or extrahepatic  biliary dilation. Spleen, pancreas, and adrenal glands are  unremarkable. Kidneys demonstrate symmetric enhancement without solid  lesions, hydronephrosis, or nephrolithiasis. Ureters and urinary  bladder are unremarkable. Prostatomegaly.     Small hiatal hernia. Stomach and small intestine are otherwise  unremarkable. Colonic diverticulosis without evidence of acute  diverticulitis. Appendix is visualized and normal in caliber.      No suspicious abdominal or pelvic lymphadenopathy. No free fluid. No  pneumoperitoneum.     Abdominal aorta is normal in caliber and patent. Celiac and mesenteric  artery origins are unremarkable. Portal veins and IVC are patent.     Lower thorax: Unremarkable.     Bones and soft tissues: No acute or suspicious osseous abnormality.  Soft tissue is unremarkable.                                                                      Impression:   1. Hepatic steatosis  2. Persistent focal gallbladder wall thickening as seen on ultrasound  10/13/2021, nonspecific, possibly focal  asymmetric adenomyomatosis in  an asymptomatic patient. Recommend 6 month follow-up abdominal  ultrasound as previously recommended.            ASSESSMENT and PLAN  61-year-old man with symptoms of BPH and LUTS    BPH and LUTS  -Reviewed the pathophysiology of the bladder and the prostate and the normal changes associated with the development of BPH LUTS  -I reviewed his prior CT scan and reviewed these images personally and shared them with the patient.  We discussed that he does have evidence of slight prostatomegaly with a intravesical median lobe  -We discussed the mechanism of action of alfuzosin and he is doing well with this medication with no side effects  -We discussed the option for consideration of an OAB class medication for his urinary urgency, however at this time we will plan for continued observation of this  -Plan for follow-up in 1 year      Time spent: 20 minutes spent on the date of the encounter doing chart review, history and exam, documentation and further activities as noted above.    Alex White MD   Urology  [facility]  [facility] Phone: [phone]  [facility] Phone: [phone]

## 2022-02-09 ASSESSMENT — ENCOUNTER SYMPTOMS
FEVER: 0
FREQUENCY: 1
HEMATURIA: 0
SHORTNESS OF BREATH: 0
DIARRHEA: 0
PARESTHESIAS: 0
NERVOUS/ANXIOUS: 0
JOINT SWELLING: 0
PALPITATIONS: 0
DIZZINESS: 0
HEARTBURN: 0
WEAKNESS: 0
SORE THROAT: 0
HEMATOCHEZIA: 0
ARTHRALGIAS: 0
HEADACHES: 0
ABDOMINAL PAIN: 0
DYSURIA: 0
NAUSEA: 0
CONSTIPATION: 0
EYE PAIN: 0
CHILLS: 0
MYALGIAS: 0
COUGH: 1

## 2022-02-10 ENCOUNTER — OFFICE VISIT (OUTPATIENT)
Dept: FAMILY MEDICINE | Facility: CLINIC | Age: 62
End: 2022-02-10
Payer: COMMERCIAL

## 2022-02-10 VITALS
HEIGHT: 70 IN | DIASTOLIC BLOOD PRESSURE: 88 MMHG | TEMPERATURE: 97 F | HEART RATE: 82 BPM | BODY MASS INDEX: 27.09 KG/M2 | RESPIRATION RATE: 16 BRPM | SYSTOLIC BLOOD PRESSURE: 138 MMHG | OXYGEN SATURATION: 98 % | WEIGHT: 189.25 LBS

## 2022-02-10 DIAGNOSIS — R35.0 BENIGN PROSTATIC HYPERPLASIA WITH URINARY FREQUENCY: ICD-10-CM

## 2022-02-10 DIAGNOSIS — Z00.00 ENCOUNTER FOR ANNUAL PHYSICAL EXAM: Primary | ICD-10-CM

## 2022-02-10 DIAGNOSIS — R05.8 UPPER AIRWAY COUGH SYNDROME: ICD-10-CM

## 2022-02-10 DIAGNOSIS — Z23 NEED FOR SHINGLES VACCINE: ICD-10-CM

## 2022-02-10 DIAGNOSIS — N40.1 BENIGN PROSTATIC HYPERPLASIA WITH URINARY FREQUENCY: ICD-10-CM

## 2022-02-10 DIAGNOSIS — I10 HYPERTENSION GOAL BP (BLOOD PRESSURE) < 140/90: ICD-10-CM

## 2022-02-10 DIAGNOSIS — E78.5 HYPERLIPIDEMIA LDL GOAL <100: ICD-10-CM

## 2022-02-10 LAB
ALT SERPL W P-5'-P-CCNC: 46 U/L (ref 0–70)
ANION GAP SERPL CALCULATED.3IONS-SCNC: 5 MMOL/L (ref 3–14)
BUN SERPL-MCNC: 21 MG/DL (ref 7–30)
CALCIUM SERPL-MCNC: 9.6 MG/DL (ref 8.5–10.1)
CHLORIDE BLD-SCNC: 107 MMOL/L (ref 94–109)
CHOLEST SERPL-MCNC: 131 MG/DL
CO2 SERPL-SCNC: 27 MMOL/L (ref 20–32)
CREAT SERPL-MCNC: 1.08 MG/DL (ref 0.66–1.25)
CREAT UR-MCNC: 119 MG/DL
FASTING STATUS PATIENT QL REPORTED: YES
GFR SERPL CREATININE-BSD FRML MDRD: 79 ML/MIN/1.73M2
GLUCOSE BLD-MCNC: 89 MG/DL (ref 70–99)
HDLC SERPL-MCNC: 58 MG/DL
LDLC SERPL CALC-MCNC: 59 MG/DL
MICROALBUMIN UR-MCNC: 7 MG/L
MICROALBUMIN/CREAT UR: 5.88 MG/G CR (ref 0–17)
NONHDLC SERPL-MCNC: 73 MG/DL
POTASSIUM BLD-SCNC: 4 MMOL/L (ref 3.4–5.3)
PSA SERPL-MCNC: 3.43 UG/L (ref 0–4)
SODIUM SERPL-SCNC: 139 MMOL/L (ref 133–144)
TRIGL SERPL-MCNC: 69 MG/DL

## 2022-02-10 PROCEDURE — 80048 BASIC METABOLIC PNL TOTAL CA: CPT | Performed by: INTERNAL MEDICINE

## 2022-02-10 PROCEDURE — 82043 UR ALBUMIN QUANTITATIVE: CPT | Performed by: INTERNAL MEDICINE

## 2022-02-10 PROCEDURE — 90750 HZV VACC RECOMBINANT IM: CPT | Performed by: INTERNAL MEDICINE

## 2022-02-10 PROCEDURE — 36415 COLL VENOUS BLD VENIPUNCTURE: CPT | Performed by: INTERNAL MEDICINE

## 2022-02-10 PROCEDURE — 99396 PREV VISIT EST AGE 40-64: CPT | Mod: 25 | Performed by: INTERNAL MEDICINE

## 2022-02-10 PROCEDURE — G0103 PSA SCREENING: HCPCS | Performed by: INTERNAL MEDICINE

## 2022-02-10 PROCEDURE — 80061 LIPID PANEL: CPT | Performed by: INTERNAL MEDICINE

## 2022-02-10 PROCEDURE — 90471 IMMUNIZATION ADMIN: CPT | Performed by: INTERNAL MEDICINE

## 2022-02-10 PROCEDURE — 84460 ALANINE AMINO (ALT) (SGPT): CPT | Performed by: INTERNAL MEDICINE

## 2022-02-10 RX ORDER — SODIUM PHOSPHATE,MONO-DIBASIC 19G-7G/118
1 ENEMA (ML) RECTAL DAILY
COMMUNITY

## 2022-02-10 RX ORDER — AZITHROMYCIN 250 MG/1
TABLET, FILM COATED ORAL
Qty: 6 TABLET | Refills: 2 | Status: SHIPPED | OUTPATIENT
Start: 2022-02-10 | End: 2023-04-05

## 2022-02-10 RX ORDER — ATORVASTATIN CALCIUM 10 MG/1
10 TABLET, FILM COATED ORAL DAILY
Qty: 90 TABLET | Refills: 3 | Status: SHIPPED | OUTPATIENT
Start: 2022-02-10 | End: 2022-06-28

## 2022-02-10 RX ORDER — BENZONATATE 100 MG/1
100 CAPSULE ORAL 3 TIMES DAILY PRN
Qty: 90 CAPSULE | Refills: 1 | Status: SHIPPED | OUTPATIENT
Start: 2022-02-10 | End: 2023-04-05

## 2022-02-10 RX ORDER — METOPROLOL SUCCINATE 25 MG/1
25 TABLET, EXTENDED RELEASE ORAL DAILY
Qty: 90 TABLET | Refills: 3 | Status: SHIPPED | OUTPATIENT
Start: 2022-02-10 | End: 2022-06-28

## 2022-02-10 RX ORDER — TAMSULOSIN HYDROCHLORIDE 0.4 MG/1
0.4 CAPSULE ORAL DAILY
Qty: 90 CAPSULE | Refills: 3 | Status: SHIPPED | OUTPATIENT
Start: 2022-02-10 | End: 2022-12-29

## 2022-02-10 RX ORDER — AZELASTINE 1 MG/ML
1 SPRAY, METERED NASAL 2 TIMES DAILY
Qty: 30 ML | Refills: 11 | Status: SHIPPED | OUTPATIENT
Start: 2022-02-10 | End: 2023-04-05

## 2022-02-10 ASSESSMENT — ENCOUNTER SYMPTOMS
COUGH: 1
DYSURIA: 0
PALPITATIONS: 0
CHILLS: 0
NERVOUS/ANXIOUS: 0
DIZZINESS: 0
WEAKNESS: 0
FEVER: 0
FREQUENCY: 1
HEMATOCHEZIA: 0
PARESTHESIAS: 0
EYE PAIN: 0
HEADACHES: 0
HEMATURIA: 0
DIARRHEA: 0
HEARTBURN: 0
CONSTIPATION: 0
NAUSEA: 0
SORE THROAT: 0
ARTHRALGIAS: 0
ABDOMINAL PAIN: 0
MYALGIAS: 0
SHORTNESS OF BREATH: 0
JOINT SWELLING: 0

## 2022-02-10 ASSESSMENT — PAIN SCALES - GENERAL: PAINLEVEL: NO PAIN (0)

## 2022-02-10 ASSESSMENT — MIFFLIN-ST. JEOR: SCORE: 1682.81

## 2022-02-10 NOTE — PROGRESS NOTES
Prior to immunization administration, verified patients identity using patient s name and date of birth. Please see Immunization Activity for additional information.     Screening Questionnaire for Adult Immunization    Are you sick today?   No   Do you have allergies to medications, food, a vaccine component or latex?   No   Have you ever had a serious reaction after receiving a vaccination?   No   Do you have a long-term health problem with heart, lung, kidney, or metabolic disease (e.g., diabetes), asthma, a blood disorder, no spleen, complement component deficiency, a cochlear implant, or a spinal fluid leak?  Are you on long-term aspirin therapy?   No   Do you have cancer, leukemia, HIV/AIDS, or any other immune system problem?   No   Do you have a parent, brother, or sister with an immune system problem?   No   In the past 3 months, have you taken medications that affect  your immune system, such as prednisone, other steroids, or anticancer drugs; drugs for the treatment of rheumatoid arthritis, Crohn s disease, or psoriasis; or have you had radiation treatments?   No   Have you had a seizure, or a brain or other nervous system problem?   No   During the past year, have you received a transfusion of blood or blood    products, or been given immune (gamma) globulin or antiviral drug?   No   For women: Are you pregnant or is there a chance you could become       pregnant during the next month?   No   Have you received any vaccinations in the past 4 weeks?   No     Immunization questionnaire answers were all negative.        Per orders of Dr. Lazo, injection of Shingrix given by Kay Ramirez RN. Patient instructed to remain in clinic for 15 minutes afterwards, and to report any adverse reaction to me immediately.       Screening performed by Kay Ramirez RN on 2/10/2022 at 8:56 AM.

## 2022-02-10 NOTE — PROGRESS NOTES
"SUBJECTIVE:   CC: Ruddy Aaron is an 61 year old male who presents for preventative health visit.     Patient has been advised of split billing requirements and indicates understanding: Yes     Are you in the first 12 months of your Medicare Part B coverage?  No    Physical Health:    In general, how would you rate your overall physical health? good    Outside of work, how many days during the week do you exercise?4-5 days/week    Outside of work, approximately how many minutes a day do you exercise?15-30 minutes    If you drink alcohol do you typically have >3 drinks per day or >7 drinks per week? No    Do you usually eat at least 4 servings of fruit and vegetables a day, include whole grains & fiber and avoid regularly eating high fat or \"junk\" foods? Yes    Do you have any problems taking medications regularly? No    Do you have any side effects from medications? not applicable    Needs assistance for the following daily activities: no assistance needed    Which of the following safety concerns are present in your home?  none identified     Hearing impairment: Yes, Difficulty following a conversation in a noisy restaurant or crowded room.    In the past 6 months, have you been bothered by leaking of urine? no    Mental Health:    In general, how would you rate your overall mental or emotional health? excellent  PHQ-2 Score: (P) 0    Do you feel safe in your environment? Yes    Have you ever done Advance Care Planning? (For example, a Health Directive, POLST, or a discussion with a medical provider or your loved ones about your wishes)? No, advance care planning information given to patient to review.  Patient plans to discuss their wishes with loved ones or provider.      Fall risk:  Fall Risk Assessment not completed.  click delete button to remove this line now  Cognitive Screening:     Do you have sleep apnea, excessive snoring or daytime drowsiness?: yesoring    Current providers sharing in care for this " patient include:   Patient Care Team:  Berry Lazo MD as PCP - General (Internal Medicine)  Berry Lazo MD as Assigned PCP  Randy Pope MD as Assigned Surgical Provider  Jair Mills MD as Assigned Pulmonology Provider    Patient has been advised of split billing requirements and indicates understanding: Yes    Today's PHQ-2 Score:   PHQ-2 ( 1999 Pfizer) 2/9/2022   Q1: Little interest or pleasure in doing things 0   Q2: Feeling down, depressed or hopeless 0   PHQ-2 Score 0   PHQ-2 Total Score (12-17 Years)- Positive if 3 or more points; Administer PHQ-A if positive -   Q1: Little interest or pleasure in doing things Not at all   Q2: Feeling down, depressed or hopeless Not at all   PHQ-2 Score 0       Abuse: Current or Past(Physical, Sexual or Emotional)- No  Do you feel safe in your environment? Yes    Cough persistent started before Covid, and worse after recovery.  Requesting medicine to lose belly fat.    Social History     Tobacco Use     Smoking status: Never Smoker     Smokeless tobacco: Never Used   Substance Use Topics     Alcohol use: Yes     Alcohol/week: 1.0 standard drink     Types: 1 Standard drinks or equivalent per week     Comment: ocassional     If you drink alcohol do you typically have >3 drinks per day or >7 drinks per week? No    Alcohol Use 2/9/2022   Prescreen: >3 drinks/day or >7 drinks/week? No   Prescreen: >3 drinks/day or >7 drinks/week? -   No flowsheet data found.    Last PSA:   PSA   Date Value Ref Range Status   06/23/2015 1.10 0 - 4 ug/L Final     Prostate Specific Antigen Screen   Date Value Ref Range Status   10/12/2021 1.36 0.00 - 4.00 ug/L Final       Reviewed orders with patient. Reviewed health maintenance and updated orders accordingly - Yes  Labs reviewed in EPIC  BP Readings from Last 3 Encounters:   02/10/22 138/88   12/06/21 118/77   09/17/21 121/81    Wt Readings from Last 3 Encounters:   02/10/22 85.8 kg (189 lb 4 oz)   12/06/21 89.4 kg (197  lb)   12/06/21 89.8 kg (197 lb 14.4 oz)                  Patient Active Problem List   Diagnosis     CARDIOVASCULAR SCREENING; LDL GOAL LESS THAN 130     Headaches, tension-type     Chronic eczema     Hypertension goal BP (blood pressure) < 140/90     Seasonal allergic rhinitis     Overweight (BMI 25.0-29.9)     Neck pain     Nonallergic rhinitis     Combined forms of age-related cataract, mild, of both eyes     Seborrheic dermatitis     Hyperlipidemia LDL goal <100     Upper airway cough syndrome     Benign prostatic hyperplasia with urinary frequency     Chronic cough     Past Surgical History:   Procedure Laterality Date     VASECTOMY         Social History     Tobacco Use     Smoking status: Never Smoker     Smokeless tobacco: Never Used   Substance Use Topics     Alcohol use: Yes     Alcohol/week: 1.0 standard drink     Types: 1 Standard drinks or equivalent per week     Comment: ocassional     Family History   Problem Relation Age of Onset     Hypertension Father      Eye Disorder Mother      Eye Disorder Other         mother's side of family has cataracts, and glaucoma?     Asthma No family hx of      C.A.D. No family hx of      Diabetes No family hx of      Cerebrovascular Disease No family hx of      Breast Cancer No family hx of      Cancer - colorectal No family hx of      Prostate Cancer No family hx of          Allergies   Allergen Reactions     Lisinopril Cough     Recent Labs   Lab Test 02/10/22  0901 02/10/21  1018 08/26/20  1052 07/06/20  1220   A1C  --   --   --  5.3   LDL 59 65 124*  --    HDL 58 50 56  --    TRIG 69 84 99  --    ALT 46 59 67  --    CR 1.08 1.15  --  1.07   GFRESTIMATED 79 70  --  76   GFRESTBLACK  --  81  --  88   POTASSIUM 4.0 3.7  --  4.0        Reviewed and updated as needed this visit by clinical staff  Tobacco  Allergies  Meds   Med Hx  Surg Hx  Fam Hx  Soc Hx       Reviewed and updated as needed this visit by Provider                   Review of Systems    Constitutional: Negative for chills and fever.   HENT: Positive for hearing loss. Negative for congestion, ear pain and sore throat.    Eyes: Negative for pain and visual disturbance.   Respiratory: Positive for cough. Negative for shortness of breath.    Cardiovascular: Negative for chest pain, palpitations and peripheral edema.   Gastrointestinal: Negative for abdominal pain, constipation, diarrhea, heartburn, hematochezia and nausea.   Genitourinary: Positive for frequency. Negative for dysuria, genital sores, hematuria, impotence, penile discharge and urgency.   Musculoskeletal: Negative for arthralgias, joint swelling and myalgias.   Skin: Negative for rash.   Neurological: Negative for dizziness, weakness, headaches and paresthesias.   Psychiatric/Behavioral: Negative for mood changes. The patient is not nervous/anxious.          OBJECTIVE:       Physical Exam  GENERAL: healthy, alert and no distress  EYES: Eyes grossly normal to inspection  NECK: no adenopathy  RESP: lungs clear to auscultation - no rales, rhonchi or wheezes  CV: regular rate and rhythm, normal S1 S2, no S3 or S4, no murmur, click or rub, no peripheral edema and peripheral pulses strong  ABDOMEN: soft, nontender, no hepatosplenomegaly, no masses and bowel sounds normal  MS: no gross musculoskeletal defects noted, no edema  SKIN: no suspicious lesions or rashes  PSYCH: mentation appears normal, affect normal/bright    Diagnostic Test Results:  Results for orders placed or performed in visit on 02/10/22   BASIC METABOLIC PANEL     Status: Normal   Result Value Ref Range    Sodium 139 133 - 144 mmol/L    Potassium 4.0 3.4 - 5.3 mmol/L    Chloride 107 94 - 109 mmol/L    Carbon Dioxide (CO2) 27 20 - 32 mmol/L    Anion Gap 5 3 - 14 mmol/L    Urea Nitrogen 21 7 - 30 mg/dL    Creatinine 1.08 0.66 - 1.25 mg/dL    Calcium 9.6 8.5 - 10.1 mg/dL    Glucose 89 70 - 99 mg/dL    GFR Estimate 79 >60 mL/min/1.73m2   Lipid panel reflex to direct LDL Fasting      Status: None   Result Value Ref Range    Cholesterol 131 <200 mg/dL    Triglycerides 69 <150 mg/dL    Direct Measure HDL 58 >=40 mg/dL    LDL Cholesterol Calculated 59 <=100 mg/dL    Non HDL Cholesterol 73 <130 mg/dL    Patient Fasting > 8hrs? Yes     Narrative    Cholesterol  Desirable:  <200 mg/dL    Triglycerides  Normal:  Less than 150 mg/dL  Borderline High:  150-199 mg/dL  High:  200-499 mg/dL  Very High:  Greater than or equal to 500 mg/dL    Direct Measure HDL  Female:  Greater than or equal to 50 mg/dL   Male:  Greater than or equal to 40 mg/dL    LDL Cholesterol  Desirable:  <100mg/dL  Above Desirable:  100-129 mg/dL   Borderline High:  130-159 mg/dL   High:  160-189 mg/dL   Very High:  >= 190 mg/dL    Non HDL Cholesterol  Desirable:  130 mg/dL  Above Desirable:  130-159 mg/dL  Borderline High:  160-189 mg/dL  High:  190-219 mg/dL  Very High:  Greater than or equal to 220 mg/dL   ALT     Status: Normal   Result Value Ref Range    ALT 46 0 - 70 U/L       ASSESSMENT/PLAN:     Encounter for annual physical exam  - BASIC METABOLIC PANEL  - Lipid panel reflex to direct LDL Fasting  - ALT    Hypertension goal BP (blood pressure) < 140/90  - BASIC METABOLIC PANEL  - Albumin Random Urine Quantitative with Creat Ratio    Hyperlipidemia LDL goal <100  - atorvastatin (LIPITOR) 10 MG tablet; Take 1 tablet (10 mg) by mouth daily  - Lipid panel reflex to direct LDL Fasting  - ALT    Benign prostatic hyperplasia with urinary frequency  - tamsulosin (FLOMAX) 0.4 MG capsule; Take 1 capsule (0.4 mg) by mouth daily    Upper airway cough syndrome  - azelastine (ASTELIN) 0.1 % nasal spray; Spray 1 spray into both nostrils 2 times daily  - azithromycin (ZITHROMAX) 250 MG tablet; Two tablets first day, then one tablet daily for four days.  - benzonatate (TESSALON) 100 MG capsule; Take 1 capsule (100 mg) by mouth 3 times daily as needed for cough    Need for shingles vaccine  - ZOSTER VACCINE RECOMBINANT ADJUVANTED IM  "NJX        Patient has been advised of split billing requirements and indicates understanding: Yes    COUNSELING:   Special attention given to:        Regular exercise       Healthy diet/nutrition       Prostate cancer screening       The 10-year ASCVD risk score (Celso NAIDU JrWes, et al., 2013) is: 6.3%    Values used to calculate the score:      Age: 61 years      Sex: Male      Is Non- : No      Diabetic: No      Tobacco smoker: No      Systolic Blood Pressure: 138 mmHg      Is BP treated: Yes      HDL Cholesterol: 58 mg/dL      Total Cholesterol: 131 mg/dL    Estimated body mass index is 27.48 kg/m  as calculated from the following:    Height as of 12/6/21: 1.803 m (5' 11\").    Weight as of 12/6/21: 89.4 kg (197 lb).     Weight management plan: diet and exercise.    He reports that he has never smoked. He has never used smokeless tobacco.      Counseling Resources:  ATP IV Guidelines  Pooled Cohorts Equation Calculator  FRAX Risk Assessment  ICSI Preventive Guidelines  Dietary Guidelines for Americans, 2010  USDA's MyPlate  ASA Prophylaxis  Lung CA Screening    Berry Lazo MD  [facility]  "

## 2022-02-23 ENCOUNTER — MYC MEDICAL ADVICE (OUTPATIENT)
Dept: UROLOGY | Facility: CLINIC | Age: 62
End: 2022-02-23
Payer: COMMERCIAL

## 2022-02-23 DIAGNOSIS — R35.1 BPH ASSOCIATED WITH NOCTURIA: ICD-10-CM

## 2022-02-23 DIAGNOSIS — R39.15 URINARY URGENCY: ICD-10-CM

## 2022-02-23 DIAGNOSIS — N40.1 BPH ASSOCIATED WITH NOCTURIA: ICD-10-CM

## 2022-02-23 RX ORDER — ALFUZOSIN HYDROCHLORIDE 10 MG/1
10 TABLET, EXTENDED RELEASE ORAL DAILY
Qty: 90 TABLET | Refills: 3 | Status: SHIPPED | OUTPATIENT
Start: 2022-02-23 | End: 2022-06-30

## 2022-02-23 NOTE — CONFIDENTIAL NOTE
Alex White MD  [facility] Adult [place] 1 hour ago (12:00 PM)     BH    Yes, he could have a 90 days with 3 refills.   Thanks,   Alex White M.D.      Received the above message from Dr. White. alfuzosin 90 tablets with 3 refills sent to [facility] in [place]. My chart message sent to patient.    Becka Rosales RN, BSN

## 2022-03-28 ENCOUNTER — MYC MEDICAL ADVICE (OUTPATIENT)
Dept: FAMILY MEDICINE | Facility: CLINIC | Age: 62
End: 2022-03-28
Payer: COMMERCIAL

## 2022-04-04 NOTE — TELEPHONE ENCOUNTER
See [place] message below.     Routing to provider to review and advise.    Humaira Calles RN  [facility]

## 2022-04-23 ENCOUNTER — MYC MEDICAL ADVICE (OUTPATIENT)
Dept: FAMILY MEDICINE | Facility: CLINIC | Age: 62
End: 2022-04-23
Payer: COMMERCIAL

## 2022-04-24 ENCOUNTER — E-VISIT (OUTPATIENT)
Dept: FAMILY MEDICINE | Facility: CLINIC | Age: 62
End: 2022-04-24
Payer: COMMERCIAL

## 2022-04-24 DIAGNOSIS — R05.3 CHRONIC COUGH: Primary | ICD-10-CM

## 2022-04-24 PROCEDURE — 99207 PR NON-BILLABLE SERV PER CHARTING: CPT | Performed by: INTERNAL MEDICINE

## 2022-04-25 NOTE — TELEPHONE ENCOUNTER
Patient's wife is calling about a referral that was requested a while back. Provider said referral had been sent but patient is being told that the [facility] never received it. Patient is still in need of getting a referral to the [facility] in [place] for Pulmonary. Fax number for the [facility] is faxed to [phone] (fastest), Referral can also be called in at [phone]. Patient would like to be updated when this is done.  Lizbeth Sofia  [facility]  2nd Floor  Primary Care

## 2022-04-27 ENCOUNTER — MEDICAL CORRESPONDENCE (OUTPATIENT)
Dept: HEALTH INFORMATION MANAGEMENT | Facility: CLINIC | Age: 62
End: 2022-04-27
Payer: COMMERCIAL

## 2022-05-11 ENCOUNTER — MYC MEDICAL ADVICE (OUTPATIENT)
Dept: FAMILY MEDICINE | Facility: CLINIC | Age: 62
End: 2022-05-11
Payer: COMMERCIAL

## 2022-05-11 DIAGNOSIS — I10 HTN, GOAL BELOW 130/80: Primary | ICD-10-CM

## 2022-05-11 NOTE — TELEPHONE ENCOUNTER
Routing to provider to review and advise, see [employer] message below.    triamterene-HCTZ (DYAZIDE) 37.5-25 MG capsule (Discontinued) as of 2/10/22 by PCP.  Please clarify if patient is to be taking this medication, as patient is asking for refill now.      Oneyda Valentin RN  [facility]

## 2022-05-12 NOTE — TELEPHONE ENCOUNTER
Patient is calling about getting this medication authorized. Is he to be taking this medication. Please advise.  Lizbeth Sofia  [facility]  2nd Floor  Primary Care

## 2022-06-03 NOTE — TELEPHONE ENCOUNTER
Patient is following up on message below.  Patient would like to speak to original RN, who is not available at this time.  Advised Dr Berry Lazo has not yet responded to message.  Will route message to RN Care team, high priority, to please follow up with provider for a response.  Isis Laguna, RN

## 2022-06-03 NOTE — TELEPHONE ENCOUNTER
Patient called to clinic in regards to original [facility] message below from 5/11/22, noting he hasn't heard any response back from PCP as of yet.    Patient concerned about blood pressure. Never stopped taking triamterene-HCTZ (DYAZIDE) 37.5-25 MG capsules. Medication was discontinued from med list at time of last OV on 2/10/22 but patient doesn't recall being told to stop taking medication and notes that if he was advised to do so, forgot, so has been taking this medication daily.  Ran out of pills mid May, around 5/14/22. Only taking Metoprolol succinate 25 mg daily since.    Since stopped taking Dyazide, has noticed swelling developing in both ankles and feet, more so in the right ankle. Now today, significantly more swollen than have been before.  Elevating feet.  Denies any pain, drainage, still able to walk and be on feet.    Patient also noting that his blood pressure has slowly been increasing over the last 2 weeks.  3 weeks ago, average BP was 121/80  2 weeks ago, average BP was 129/88  1 week ago, average BP was 135/94  This week, average BP has been 133/90  BP this morning was 134/90  Patient noting his diastolic number doesn't seem to get below 90 in the last 1-2 weeks.     Denies chest pain or pressure, shortness of breath.    Patient asking for advisement on BP and increasing readings, as well as management of bilateral ankle/foot swelling.    Routing to provider to review and advise.      Oneyda Valentin RN  [facility]

## 2022-06-06 RX ORDER — TRIAMTERENE AND HYDROCHLOROTHIAZIDE 37.5; 25 MG/1; MG/1
1 CAPSULE ORAL EVERY MORNING
Qty: 90 CAPSULE | Refills: 3 | Status: SHIPPED | OUTPATIENT
Start: 2022-06-06 | End: 2022-06-28

## 2022-06-06 NOTE — TELEPHONE ENCOUNTER
Routing to provider to review and advise. Please see [employer] message below.     Liberty Bellamy RN, BSN  [facility]

## 2022-06-27 ENCOUNTER — MYC MEDICAL ADVICE (OUTPATIENT)
Dept: FAMILY MEDICINE | Facility: CLINIC | Age: 62
End: 2022-06-27

## 2022-06-27 ENCOUNTER — MYC MEDICAL ADVICE (OUTPATIENT)
Dept: UROLOGY | Facility: CLINIC | Age: 62
End: 2022-06-27

## 2022-06-27 ENCOUNTER — TELEPHONE (OUTPATIENT)
Dept: FAMILY MEDICINE | Facility: CLINIC | Age: 62
End: 2022-06-27

## 2022-06-27 NOTE — TELEPHONE ENCOUNTER
Patient calling to see when he is due for his 2nd shingrix. Informed patient he is due 2-6 months after the first.     Jenna ASHTONN, RN

## 2022-06-29 DIAGNOSIS — N40.1 BPH ASSOCIATED WITH NOCTURIA: ICD-10-CM

## 2022-06-29 DIAGNOSIS — R39.15 URINARY URGENCY: ICD-10-CM

## 2022-06-29 DIAGNOSIS — R35.1 BPH ASSOCIATED WITH NOCTURIA: ICD-10-CM

## 2022-06-30 RX ORDER — ALFUZOSIN HYDROCHLORIDE 10 MG/1
10 TABLET, EXTENDED RELEASE ORAL DAILY
Qty: 90 TABLET | Refills: 3 | Status: SHIPPED | OUTPATIENT
Start: 2022-06-30 | End: 2023-04-05

## 2022-06-30 NOTE — TELEPHONE ENCOUNTER
alfuzosin ER (UROXATRAL) 10 MG 24 hr tablet  Sent new updated order to updated/alternative pharmacy for Pt care.       Morelia Jackson RN  Central Triage Red Flags/Med Refills      Warnings Override History for alfuzosin ER (UROXATRAL) 10 MG 24 hr tablet [[id number]]    Overridden by Jesenia Novoa, REBECCA on Jun 28, 2022 2:49 PM   Drug-Drug   1. QUINAZOLINES / BETA-ADRENERGIC BLOCKERS [Level: Moderate] [Reason: Tolerated medication/side effects in past]   Other Orders: metoprolol succinate ER (TOPROL XL) 25 MG 24 hr tablet         Overridden by Becka Rosales RN on Feb 23, 2022 1:32 PM   Drug-Drug   1. QUINAZOLINES / BETA-ADRENERGIC BLOCKERS [Level: Moderate]   Other Orders: metoprolol succinate ER (TOPROL-XL) 25 MG 24 hr tablet

## 2022-07-20 ENCOUNTER — MYC MEDICAL ADVICE (OUTPATIENT)
Dept: FAMILY MEDICINE | Facility: CLINIC | Age: 62
End: 2022-07-20

## 2022-07-20 DIAGNOSIS — R05.3 CHRONIC COUGH: Primary | ICD-10-CM

## 2022-07-21 NOTE — TELEPHONE ENCOUNTER
See [employer] message below.     Patient requesting lab visit for CBC.     Please place orders for CBC if appropriate and route to TC team to schedule patient for lab only visit.     Thank you.     Humaira Calles RN  [facility]

## 2022-08-12 ENCOUNTER — LAB (OUTPATIENT)
Dept: LAB | Facility: CLINIC | Age: 62
End: 2022-08-12
Payer: COMMERCIAL

## 2022-08-12 ENCOUNTER — ALLIED HEALTH/NURSE VISIT (OUTPATIENT)
Dept: FAMILY MEDICINE | Facility: CLINIC | Age: 62
End: 2022-08-12

## 2022-08-12 DIAGNOSIS — Z23 NEED FOR VACCINATION: Primary | ICD-10-CM

## 2022-08-12 DIAGNOSIS — R05.3 CHRONIC COUGH: ICD-10-CM

## 2022-08-12 LAB
BASOPHILS # BLD AUTO: 0 10E3/UL (ref 0–0.2)
BASOPHILS NFR BLD AUTO: 1 %
EOSINOPHIL # BLD AUTO: 0.2 10E3/UL (ref 0–0.7)
EOSINOPHIL NFR BLD AUTO: 3 %
ERYTHROCYTE [DISTWIDTH] IN BLOOD BY AUTOMATED COUNT: 14.1 % (ref 10–15)
HCT VFR BLD AUTO: 51.5 % (ref 40–53)
HGB BLD-MCNC: 17.4 G/DL (ref 13.3–17.7)
IMM GRANULOCYTES # BLD: 0 10E3/UL
IMM GRANULOCYTES NFR BLD: 0 %
LYMPHOCYTES # BLD AUTO: 2.8 10E3/UL (ref 0.8–5.3)
LYMPHOCYTES NFR BLD AUTO: 33 %
MCH RBC QN AUTO: 29 PG (ref 26.5–33)
MCHC RBC AUTO-ENTMCNC: 33.8 G/DL (ref 31.5–36.5)
MCV RBC AUTO: 86 FL (ref 78–100)
MONOCYTES # BLD AUTO: 0.6 10E3/UL (ref 0–1.3)
MONOCYTES NFR BLD AUTO: 6 %
NEUTROPHILS # BLD AUTO: 4.9 10E3/UL (ref 1.6–8.3)
NEUTROPHILS NFR BLD AUTO: 58 %
PLATELET # BLD AUTO: 193 10E3/UL (ref 150–450)
RBC # BLD AUTO: 5.99 10E6/UL (ref 4.4–5.9)
WBC # BLD AUTO: 8.5 10E3/UL (ref 4–11)

## 2022-08-12 PROCEDURE — 85025 COMPLETE CBC W/AUTO DIFF WBC: CPT

## 2022-08-12 PROCEDURE — 36415 COLL VENOUS BLD VENIPUNCTURE: CPT

## 2022-08-12 PROCEDURE — 99207 PR NO CHARGE NURSE ONLY: CPT

## 2022-08-12 PROCEDURE — 90750 HZV VACC RECOMBINANT IM: CPT

## 2022-08-12 PROCEDURE — 90471 IMMUNIZATION ADMIN: CPT

## 2022-08-12 NOTE — PROGRESS NOTES
Prior to immunization administration, verified patients identity using patient s name and date of birth. Please see Immunization Activity for additional information.     Screening Questionnaire for Adult Immunization    Are you sick today?   No   Do you have allergies to medications, food, a vaccine component or latex?   No   Have you ever had a serious reaction after receiving a vaccination?   No   Do you have a long-term health problem with heart, lung, kidney, or metabolic disease (e.g., diabetes), asthma, a blood disorder, no spleen, complement component deficiency, a cochlear implant, or a spinal fluid leak?  Are you on long-term aspirin therapy?   No   Do you have cancer, leukemia, HIV/AIDS, or any other immune system problem?   No   Do you have a parent, brother, or sister with an immune system problem?   No   In the past 3 months, have you taken medications that affect  your immune system, such as prednisone, other steroids, or anticancer drugs; drugs for the treatment of rheumatoid arthritis, Crohn s disease, or psoriasis; or have you had radiation treatments?   No   Have you had a seizure, or a brain or other nervous system problem?   No   During the past year, have you received a transfusion of blood or blood    products, or been given immune (gamma) globulin or antiviral drug?   No   For women: Are you pregnant or is there a chance you could become       pregnant during the next month?   No   Have you received any vaccinations in the past 4 weeks?   No     Immunization questionnaire answers were all negative.        Per orders of Dr. Lazo, injection of Shingrix given by Daniela Abrams RN. Patient instructed to remain in clinic for 15 minutes afterwards, and to report any adverse reaction to me immediately.       Screening performed by Daniela Abrams RN on 8/12/2022 at 9:13 AM.

## 2022-10-16 ENCOUNTER — HEALTH MAINTENANCE LETTER (OUTPATIENT)
Age: 62
End: 2022-10-16

## 2022-12-14 ENCOUNTER — E-VISIT (OUTPATIENT)
Dept: FAMILY MEDICINE | Facility: CLINIC | Age: 62
End: 2022-12-14
Payer: COMMERCIAL

## 2022-12-14 ENCOUNTER — NURSE TRIAGE (OUTPATIENT)
Dept: FAMILY MEDICINE | Facility: CLINIC | Age: 62
End: 2022-12-14

## 2022-12-14 DIAGNOSIS — J01.80 OTHER ACUTE SINUSITIS, RECURRENCE NOT SPECIFIED: Primary | ICD-10-CM

## 2022-12-14 PROCEDURE — 99421 OL DIG E/M SVC 5-10 MIN: CPT | Performed by: INTERNAL MEDICINE

## 2022-12-14 NOTE — TELEPHONE ENCOUNTER
Pt calling with concerns of nonstop coughing and hoarse voice, started Saturday or Sunday. Also has stuffy nose. Taking mucinex and doing steam inhalations. No fever no headache. No muscle aches.   Took covid test last night and it was negative. Pt states coughing is interfering with work, had to stop a meeting yesterday d/t symptoms.     Offered pt virtual visit with a primary care provider tomorrow. Pt only wants to see Dr. Lazo. There are no appts available. Writer advised pt see another provider or submit evisit. Pt agreeable to submit evisit with Dr. Lazo.    Maria Luisa Umanzor RN    [facility]- Primary Care      Reason for Disposition    Continuous (nonstop) coughing interferes with work or school and no improvement using cough treatment per Care Advice    Additional Information    Negative: Bluish (or gray) lips or face    Negative: SEVERE difficulty breathing (e.g., struggling for each breath, speaks in single words)    Negative: Rapid onset of cough and has hives    Negative: Coughing started suddenly after medicine, an allergic food or bee sting    Negative: Difficulty breathing after exposure to flames, smoke, or fumes    Negative: Sounds like a life-threatening emergency to the triager    Negative: Previous asthma attacks and this feels like asthma attack    Negative: Dry cough (non-productive; no sputum or minimal clear sputum) and within 14 days of COVID-19 Exposure    Negative: MODERATE difficulty breathing (e.g., speaks in phrases, SOB even at rest, pulse 100-120) and still present when not coughing    Negative: Chest pain present when not coughing    Negative: Passed out (i.e., fainted, collapsed and was not responding)    Negative: Patient sounds very sick or weak to the triager    Negative: MILD difficulty breathing (e.g., minimal/no SOB at rest, SOB with walking, pulse <100) and still present when not coughing    Negative: Coughed up > 1 tablespoon (15 ml) blood  "(Exception: Blood-tinged sputum.)    Negative: Fever > 103 F (39.4 C)    Negative: Fever > 101 F (38.3 C) and over 60 years of age    Negative: Fever > 100.0 F (37.8 C) and has diabetes mellitus or a weak immune system (e.g., HIV positive, cancer chemotherapy, organ transplant, splenectomy, chronic steroids)    Negative: Fever > 100.0 F (37.8 C) and bedridden (e.g., nursing home patient, stroke, chronic illness, recovering from surgery)    Negative: Increasing ankle swelling    Negative: Wheezing is present    Negative: SEVERE coughing spells (e.g., whooping sound after coughing, vomiting after coughing)    Negative: Coughing up chris-colored (reddish-brown) or blood-tinged sputum    Negative: Fever present > 3 days (72 hours)    Negative: Fever returns after gone for over 24 hours and symptoms worse or not improved    Negative: Using nasal washes and pain medicine > 24 hours and sinus pain persists    Negative: Known COPD or other severe lung disease (i.e., bronchiectasis, cystic fibrosis, lung surgery) and worsening symptoms (i.e., increased sputum purulence or amount, increased breathing difficulty)    Answer Assessment - Initial Assessment Questions  1. ONSET: \"When did the cough begin?\"       Saturday  2. SEVERITY: \"How bad is the cough today?\"       Strong  3. SPUTUM: \"Describe the color of your sputum\" (none, dry cough; clear, white, yellow, green)      In the morning and at night when he showers, coughing up \"crud.\" When it first started it was green  4. HEMOPTYSIS: \"Are you coughing up any blood?\" If so ask: \"How much?\" (flecks, streaks, tablespoons, etc.)      No  5. DIFFICULTY BREATHING: \"Are you having difficulty breathing?\" If Yes, ask: \"How bad is it?\" (e.g., mild, moderate, severe)     - MILD: No SOB at rest, mild SOB with walking, speaks normally in sentences, can lie down, no retractions, pulse < 100.     - MODERATE: SOB at rest, SOB with minimal exertion and prefers to sit, cannot lie down flat, " "speaks in phrases, mild retractions, audible wheezing, pulse 100-120.     - SEVERE: Very SOB at rest, speaks in single words, struggling to breathe, sitting hunched forward, retractions, pulse > 120       No  6. FEVER: \"Do you have a fever?\" If Yes, ask: \"What is your temperature, how was it measured, and when did it start?\"      No  7. CARDIAC HISTORY: \"Do you have any history of heart disease?\" (e.g., heart attack, congestive heart failure)       No  8. LUNG HISTORY: \"Do you have any history of lung disease?\"  (e.g., pulmonary embolus, asthma, emphysema)      No  9. PE RISK FACTORS: \"Do you have a history of blood clots?\" (or: recent major surgery, recent prolonged travel, bedridden)      No  10. OTHER SYMPTOMS: \"Do you have any other symptoms?\" (e.g., runny nose, wheezing, chest pain)        Congestion  11. PREGNANCY: \"Is there any chance you are pregnant?\" \"When was your last menstrual period?\"        No  12. TRAVEL: \"Have you traveled out of the country in the last month?\" (e.g., travel history, exposures)        Yes, a week ago    Protocols used: COUGH-A-OH      "

## 2022-12-15 ENCOUNTER — TELEPHONE (OUTPATIENT)
Dept: FAMILY MEDICINE | Facility: CLINIC | Age: 62
End: 2022-12-15

## 2022-12-15 RX ORDER — AZITHROMYCIN 250 MG/1
TABLET, FILM COATED ORAL
Qty: 6 TABLET | Refills: 5 | Status: SHIPPED | OUTPATIENT
Start: 2022-12-15 | End: 2023-04-05

## 2022-12-15 NOTE — TELEPHONE ENCOUNTER
New Medication Request        What medication are you requesting?: Antibiotic    Reason for medication request: coughing and hoarse voice/ itchy throat. Patient had also called in to be seen with Dr but has yet to get a response on the appointment please check Nurse Triage note on 12/14/2022. Patient is also ok with E-Visit. Patient states that if he is unable to be seen then at least he wants antibiotics.    Have you taken this medication before?: No    Controlled Substance Agreement on file:   CSA -- Patient Level:    CSA: None found at the patient level.         Patient offered an appointment? Yes, currently pending per doc's approval.    Preferred Pharmacy:   [facility]/pharmacy #[id number] [facility] [address] [address]  Phone: [phone] Fax: [phone]      Could we send this information to you in [place] or would you prefer to receive a phone call?:   Patient would prefer a phone call   Okay to leave a detailed message?: Yes at Home number on file [phone] (home)

## 2022-12-16 ENCOUNTER — TELEPHONE (OUTPATIENT)
Dept: FAMILY MEDICINE | Facility: CLINIC | Age: 62
End: 2022-12-16

## 2022-12-16 DIAGNOSIS — J01.80 OTHER ACUTE SINUSITIS, RECURRENCE NOT SPECIFIED: Primary | ICD-10-CM

## 2022-12-16 NOTE — TELEPHONE ENCOUNTER
Per wife, gave patient her prescription for Azithromycin which she was given but did not use. Patient took this last week   Patient is now on second round of Azithromycin, feels this is not helping, patient still coughing, nasal congestion, low grade fever  Cough is a dry hacky cough, has been using over the counter Mucinex  Wondering if can get a stronger antibiotic or cough medication    To provider to advise    Becka ASHTONN, RN

## 2022-12-16 NOTE — TELEPHONE ENCOUNTER
Outpatient Medication Detail     Disp Refills Start End OZIEL   amoxicillin-clavulanate (AUGMENTIN) 875-125 MG tablet 28 tablet 0 12/16/2022  --   Sig - Route: Take 1 tablet by mouth 2 times daily - Oral   Sent to pharmacy as: Amoxicillin-Pot Clavulanate 875-125 MG Oral Tablet (AUGMENTIN)   Class: E-Prescribe   Order: [id number]   E-Prescribing Status: Receipt confirmed by pharmacy (12/16/2022 11:00 AM CST)

## 2022-12-29 DIAGNOSIS — N40.1 BENIGN PROSTATIC HYPERPLASIA WITH URINARY FREQUENCY: ICD-10-CM

## 2022-12-29 DIAGNOSIS — E78.5 HYPERLIPIDEMIA LDL GOAL <100: ICD-10-CM

## 2022-12-29 DIAGNOSIS — R35.0 BENIGN PROSTATIC HYPERPLASIA WITH URINARY FREQUENCY: ICD-10-CM

## 2022-12-29 DIAGNOSIS — I10 HTN, GOAL BELOW 130/80: ICD-10-CM

## 2022-12-29 RX ORDER — TAMSULOSIN HYDROCHLORIDE 0.4 MG/1
0.4 CAPSULE ORAL DAILY
Qty: 90 CAPSULE | Refills: 3 | Status: SHIPPED | OUTPATIENT
Start: 2022-12-29 | End: 2023-04-05

## 2022-12-29 RX ORDER — METOPROLOL SUCCINATE 25 MG/1
25 TABLET, EXTENDED RELEASE ORAL DAILY
Qty: 90 TABLET | Refills: 1 | Status: SHIPPED | OUTPATIENT
Start: 2022-12-29 | End: 2023-05-30

## 2022-12-29 RX ORDER — ATORVASTATIN CALCIUM 10 MG/1
10 TABLET, FILM COATED ORAL DAILY
Qty: 90 TABLET | Refills: 3 | Status: SHIPPED | OUTPATIENT
Start: 2022-12-29 | End: 2023-04-05

## 2022-12-29 RX ORDER — TRIAMTERENE AND HYDROCHLOROTHIAZIDE 37.5; 25 MG/1; MG/1
1 CAPSULE ORAL EVERY MORNING
Qty: 90 CAPSULE | Refills: 3 | Status: SHIPPED | OUTPATIENT
Start: 2022-12-29 | End: 2023-10-31

## 2023-03-26 ENCOUNTER — HEALTH MAINTENANCE LETTER (OUTPATIENT)
Age: 63
End: 2023-03-26

## 2023-04-02 ASSESSMENT — ENCOUNTER SYMPTOMS
SHORTNESS OF BREATH: 0
DYSURIA: 0
COUGH: 1
ABDOMINAL PAIN: 0
EYE PAIN: 0
CHILLS: 0
PALPITATIONS: 0
HEMATURIA: 0
ARTHRALGIAS: 0
MYALGIAS: 0
FREQUENCY: 1
NERVOUS/ANXIOUS: 0
DIARRHEA: 0
SORE THROAT: 0
CONSTIPATION: 0
FEVER: 0
WEAKNESS: 0
DIZZINESS: 0
PARESTHESIAS: 0
JOINT SWELLING: 0
HEMATOCHEZIA: 0
HEADACHES: 0
HEARTBURN: 0
NAUSEA: 0

## 2023-04-05 ENCOUNTER — OFFICE VISIT (OUTPATIENT)
Dept: FAMILY MEDICINE | Facility: CLINIC | Age: 63
End: 2023-04-05
Payer: COMMERCIAL

## 2023-04-05 VITALS
TEMPERATURE: 97.1 F | BODY MASS INDEX: 25.93 KG/M2 | DIASTOLIC BLOOD PRESSURE: 79 MMHG | HEIGHT: 71 IN | HEART RATE: 77 BPM | OXYGEN SATURATION: 98 % | SYSTOLIC BLOOD PRESSURE: 116 MMHG | RESPIRATION RATE: 18 BRPM | WEIGHT: 185.2 LBS

## 2023-04-05 DIAGNOSIS — I10 HYPERTENSION GOAL BP (BLOOD PRESSURE) < 140/90: ICD-10-CM

## 2023-04-05 DIAGNOSIS — Z12.5 SCREENING FOR PROSTATE CANCER: ICD-10-CM

## 2023-04-05 DIAGNOSIS — E78.5 HYPERLIPIDEMIA LDL GOAL <100: ICD-10-CM

## 2023-04-05 DIAGNOSIS — N52.8 OTHER MALE ERECTILE DYSFUNCTION: ICD-10-CM

## 2023-04-05 DIAGNOSIS — Z00.00 ROUTINE GENERAL MEDICAL EXAMINATION AT A HEALTH CARE FACILITY: Primary | ICD-10-CM

## 2023-04-05 LAB
ANION GAP SERPL CALCULATED.3IONS-SCNC: 2 MMOL/L (ref 3–14)
BUN SERPL-MCNC: 22 MG/DL (ref 7–30)
CALCIUM SERPL-MCNC: 9.1 MG/DL (ref 8.5–10.1)
CHLORIDE BLD-SCNC: 111 MMOL/L (ref 94–109)
CHOLEST SERPL-MCNC: 116 MG/DL
CO2 SERPL-SCNC: 27 MMOL/L (ref 20–32)
CREAT SERPL-MCNC: 1.03 MG/DL (ref 0.66–1.25)
CREAT UR-MCNC: 295 MG/DL
FASTING STATUS PATIENT QL REPORTED: YES
GFR SERPL CREATININE-BSD FRML MDRD: 83 ML/MIN/1.73M2
GLUCOSE BLD-MCNC: 99 MG/DL (ref 70–99)
HDLC SERPL-MCNC: 50 MG/DL
LDLC SERPL CALC-MCNC: 50 MG/DL
MICROALBUMIN UR-MCNC: 14 MG/L
MICROALBUMIN/CREAT UR: 4.75 MG/G CR (ref 0–17)
NONHDLC SERPL-MCNC: 66 MG/DL
POTASSIUM BLD-SCNC: 3.9 MMOL/L (ref 3.4–5.3)
PSA SERPL-MCNC: 1.58 UG/L (ref 0–4)
SHBG SERPL-SCNC: 28 NMOL/L (ref 11–80)
SODIUM SERPL-SCNC: 140 MMOL/L (ref 133–144)
TRIGL SERPL-MCNC: 82 MG/DL

## 2023-04-05 PROCEDURE — 99214 OFFICE O/P EST MOD 30 MIN: CPT | Mod: 25 | Performed by: INTERNAL MEDICINE

## 2023-04-05 PROCEDURE — 80048 BASIC METABOLIC PNL TOTAL CA: CPT | Performed by: INTERNAL MEDICINE

## 2023-04-05 PROCEDURE — 84403 ASSAY OF TOTAL TESTOSTERONE: CPT | Performed by: INTERNAL MEDICINE

## 2023-04-05 PROCEDURE — 36415 COLL VENOUS BLD VENIPUNCTURE: CPT | Performed by: INTERNAL MEDICINE

## 2023-04-05 PROCEDURE — 84270 ASSAY OF SEX HORMONE GLOBUL: CPT | Performed by: INTERNAL MEDICINE

## 2023-04-05 PROCEDURE — 99396 PREV VISIT EST AGE 40-64: CPT | Performed by: INTERNAL MEDICINE

## 2023-04-05 PROCEDURE — 82570 ASSAY OF URINE CREATININE: CPT | Performed by: INTERNAL MEDICINE

## 2023-04-05 PROCEDURE — G0103 PSA SCREENING: HCPCS | Performed by: INTERNAL MEDICINE

## 2023-04-05 PROCEDURE — 80061 LIPID PANEL: CPT | Performed by: INTERNAL MEDICINE

## 2023-04-05 PROCEDURE — 82043 UR ALBUMIN QUANTITATIVE: CPT | Performed by: INTERNAL MEDICINE

## 2023-04-05 RX ORDER — SILDENAFIL 25 MG/1
25 TABLET, FILM COATED ORAL DAILY PRN
Qty: 30 TABLET | Refills: 11 | Status: SHIPPED | OUTPATIENT
Start: 2023-04-05

## 2023-04-05 RX ORDER — ATORVASTATIN CALCIUM 10 MG/1
10 TABLET, FILM COATED ORAL DAILY
Qty: 90 TABLET | Refills: 3 | Status: SHIPPED | OUTPATIENT
Start: 2023-04-05 | End: 2023-10-31

## 2023-04-05 RX ORDER — SILDENAFIL 25 MG/1
25 TABLET, FILM COATED ORAL DAILY PRN
Qty: 30 TABLET | Refills: 11 | Status: SHIPPED | OUTPATIENT
Start: 2023-04-05 | End: 2023-04-05

## 2023-04-05 ASSESSMENT — ENCOUNTER SYMPTOMS
DYSURIA: 0
HEADACHES: 0
SHORTNESS OF BREATH: 0
NAUSEA: 0
CONSTIPATION: 0
SORE THROAT: 0
EYE PAIN: 0
FREQUENCY: 1
ARTHRALGIAS: 0
DIARRHEA: 0
FEVER: 0
MYALGIAS: 0
JOINT SWELLING: 0
HEARTBURN: 0
COUGH: 1
HEMATOCHEZIA: 0
DIZZINESS: 0
ABDOMINAL PAIN: 0
PARESTHESIAS: 0
NERVOUS/ANXIOUS: 0
HEMATURIA: 0
WEAKNESS: 0
PALPITATIONS: 0
CHILLS: 0

## 2023-04-05 ASSESSMENT — PAIN SCALES - GENERAL: PAINLEVEL: NO PAIN (0)

## 2023-04-05 NOTE — PROGRESS NOTES
SUBJECTIVE:   CC: Ruddy is an 62 year old who presents for preventative health visit.   Patient has been advised of split billing requirements and indicates understanding: Yes  Healthy Habits:     Getting at least 3 servings of Calcium per day:  Yes    Bi-annual eye exam:  Yes    Dental care twice a year:  Yes    Sleep apnea or symptoms of sleep apnea:  None    Diet:  Regular (no restrictions)    Frequency of exercise:  None    Taking medications regularly:  Yes    Medication side effects:  Not applicable    PHQ-2 Total Score: 0    Additional concerns today:  Yes    CHRONIC CONDITIONS  Reason for visit: couh  Onset: chronic  Description: nonproductive cough   Course: intermittent  Intensity: mild  Associated symptoms: denies any fever, chills nor phlegm  History: yes  Evaluation: yes  Precipitating factor: speaking, and exacerbated by exposure to colder temperatures.  Alleviating factor: none only when patient stops talking  Complications: none  Therapies tried and outcome: antihistamines and nasal spray.  Today's PHQ-2 Score:       4/2/2023     9:31 PM   PHQ-2 ( 1999 [employer])   Q1: Little interest or pleasure in doing things 0   Q2: Feeling down, depressed or hopeless 0   PHQ-2 Score 0   Q1: Little interest or pleasure in doing things Not at all   Q2: Feeling down, depressed or hopeless Not at all   PHQ-2 Score 0           Social History     Tobacco Use     Smoking status: Never     Smokeless tobacco: Never   Vaping Use     Vaping status: Not on file   Substance Use Topics     Alcohol use: Not Currently     Alcohol/week: 1.0 standard drink of alcohol     Types: 1 Standard drinks or equivalent per week     Comment: ocassional         4/2/2023     9:30 PM   Alcohol Use   Prescreen: >3 drinks/day or >7 drinks/week? No          View : No data to display.                Last PSA:   PSA   Date Value Ref Range Status   06/23/2015 1.10 0 - 4 ug/L Final     Prostate Specific Antigen Screen   Date Value Ref Range Status    02/10/2022 3.43 0.00 - 4.00 ug/L Final       Reviewed orders with patient. Reviewed health maintenance and updated orders accordingly - Yes  Labs reviewed in EPIC  BP Readings from Last 3 Encounters:   04/05/23 116/79   02/10/22 138/88   12/06/21 118/77    Wt Readings from Last 3 Encounters:   04/05/23 84 kg (185 lb 3.2 oz)   02/10/22 85.8 kg (189 lb 4 oz)   12/06/21 89.4 kg (197 lb)                  Patient Active Problem List   Diagnosis     CARDIOVASCULAR SCREENING; LDL GOAL LESS THAN 130     Headaches, tension-type     Chronic eczema     Hypertension goal BP (blood pressure) < 140/90     Seasonal allergic rhinitis     Overweight (BMI 25.0-29.9)     Neck pain     Nonallergic rhinitis     Combined forms of age-related cataract, mild, of both eyes     Seborrheic dermatitis     Hyperlipidemia LDL goal <100     Upper airway cough syndrome     Benign prostatic hyperplasia with urinary frequency     Chronic cough     Past Surgical History:   Procedure Laterality Date     VASECTOMY         Social History     Tobacco Use     Smoking status: Never     Smokeless tobacco: Never   Vaping Use     Vaping status: Not on file   Substance Use Topics     Alcohol use: Not Currently     Alcohol/week: 1.0 standard drink of alcohol     Types: 1 Standard drinks or equivalent per week     Comment: ocassional     Family History   Problem Relation Age of Onset     Hypertension Father      Eye Disorder Mother      Eye Disorder Other         mother's side of family has cataracts, and glaucoma?     Asthma No family hx of      C.A.D. No family hx of      Diabetes No family hx of      Cerebrovascular Disease No family hx of      Breast Cancer No family hx of      Cancer - colorectal No family hx of      Prostate Cancer No family hx of          Current Outpatient Medications   Medication Sig Dispense Refill     alfuzosin ER (UROXATRAL) 10 MG 24 hr tablet Take 1 tablet (10 mg) by mouth daily 90 tablet 3     ASPIRIN 81 PO Take 1 tablet by mouth  daily       atorvastatin (LIPITOR) 10 MG tablet Take 1 tablet (10 mg) by mouth daily 90 tablet 3     glucosamine-chondroitin 500-400 MG CAPS per capsule Take 3 capsules by mouth daily       ketoconazole (NIZORAL) 2 % external cream Apply topically daily 30 g 11     metoprolol succinate ER (TOPROL XL) 25 MG 24 hr tablet Take 1 tablet (25 mg) by mouth daily 90 tablet 1     triamterene-HCTZ (DYAZIDE) 37.5-25 MG capsule Take 1 capsule by mouth every morning 90 capsule 3     ADVAIR -21 MCG/ACT inhaler TAKE 2 PUFFS BY MOUTH TWICE A DAY (Patient not taking: Reported on 4/5/2023) 36 g 3     amoxicillin-clavulanate (AUGMENTIN) 875-125 MG tablet Take 1 tablet by mouth 2 times daily (Patient not taking: Reported on 4/5/2023) 28 tablet 0     azelastine (ASTELIN) 0.1 % nasal spray Spray 1 spray into both nostrils 2 times daily (Patient not taking: Reported on 4/5/2023) 30 mL 11     azithromycin (ZITHROMAX) 250 MG tablet Two tablets first day, then one tablet daily for four days. (Patient not taking: Reported on 4/5/2023) 6 tablet 5     azithromycin (ZITHROMAX) 250 MG tablet Two tablets first day, then one tablet daily for four days. (Patient not taking: Reported on 4/5/2023) 6 tablet 2     benzonatate (TESSALON) 100 MG capsule Take 1 capsule (100 mg) by mouth 3 times daily as needed for cough (Patient not taking: Reported on 4/5/2023) 90 capsule 1     fluticasone (FLONASE) 50 MCG/ACT nasal spray INSTILL 1 SPRAY INTO BOTH NOSTRILS DAILY (Patient not taking: Reported on 4/5/2023) 48 mL 3     tamsulosin (FLOMAX) 0.4 MG capsule Take 1 capsule (0.4 mg) by mouth daily (Patient not taking: Reported on 4/5/2023) 90 capsule 3     Allergies   Allergen Reactions     Lisinopril Cough     Recent Labs   Lab Test 02/10/22  0901 02/10/21  1018 08/26/20  1052 07/06/20  1220   A1C  --   --   --  5.3   LDL 59 65 124*  --    HDL 58 50 56  --    TRIG 69 84 99  --    ALT 46 59 67  --    CR 1.08 1.15  --  1.07   GFRESTIMATED 79 70  --  76  "  GFRESTBLACK  --  81  --  88   POTASSIUM 4.0 3.7  --  4.0        Reviewed and updated as needed this visit by clinical staff   Tobacco  Allergies  Meds              Reviewed and updated as needed this visit by Provider                 Review of Systems   Constitutional: Negative for chills and fever.   HENT: Negative for congestion, ear pain, hearing loss and sore throat.    Eyes: Negative for pain and visual disturbance.   Respiratory: Positive for cough. Negative for shortness of breath.    Cardiovascular: Negative for chest pain, palpitations and peripheral edema.   Gastrointestinal: Negative for abdominal pain, constipation, diarrhea, heartburn, hematochezia and nausea.   Genitourinary: Positive for frequency and urgency. Negative for dysuria, genital sores, hematuria, impotence and penile discharge.   Musculoskeletal: Negative for arthralgias, joint swelling and myalgias.   Skin: Negative for rash.   Neurological: Negative for dizziness, weakness, headaches and paresthesias.   Psychiatric/Behavioral: Negative for mood changes. The patient is not nervous/anxious.          OBJECTIVE:   /79 (BP Location: Left arm, Patient Position: Sitting, Cuff Size: Adult Regular)   Pulse 77   Temp 97.1  F (36.2  C) (Tympanic)   Resp 18   Ht 1.803 m (5' 11\")   Wt 84 kg (185 lb 3.2 oz)   SpO2 98%   BMI 25.83 kg/m      Physical Exam  GENERAL: healthy, alert and no distress  EYES: Eyes grossly normal to inspection and conjunctivae and sclerae normal  HENT: normal cephalic/atraumatic and oral mucous membranes moist  NECK: no adenopathy and thyroid normal to palpation  RESP: lungs clear to auscultation - no rales, rhonchi or wheezes  CV: regular rates and rhythm, normal S1 S2, no S3 or S4, no murmur, click or rub, peripheral pulses strong and no peripheral edema  ABDOMEN: soft, nontender and bowel sounds normal  MS: extremities normal- no gross deformities noted  NEURO: Normal strength and tone, mentation intact and " speech normal  PSYCH: mentation appears normal, affect normal/bright    Diagnostic Test Results:  Results for orders placed or performed in visit on 04/05/23   BASIC METABOLIC PANEL     Status: Abnormal   Result Value Ref Range    Sodium 140 133 - 144 mmol/L    Potassium 3.9 3.4 - 5.3 mmol/L    Chloride 111 (H) 94 - 109 mmol/L    Carbon Dioxide (CO2) 27 20 - 32 mmol/L    Anion Gap 2 (L) 3 - 14 mmol/L    Urea Nitrogen 22 7 - 30 mg/dL    Creatinine 1.03 0.66 - 1.25 mg/dL    Calcium 9.1 8.5 - 10.1 mg/dL    Glucose 99 70 - 99 mg/dL    GFR Estimate 83 >60 mL/min/1.73m2   Albumin Random Urine Quantitative with Creat Ratio     Status: None   Result Value Ref Range    Creatinine Urine mg/dL 295 mg/dL    Albumin Urine mg/L 14 mg/L    Albumin Urine mg/g Cr 4.75 0.00 - 17.00 mg/g Cr   Lipid panel reflex to direct LDL Fasting     Status: None   Result Value Ref Range    Cholesterol 116 <200 mg/dL    Triglycerides 82 <150 mg/dL    Direct Measure HDL 50 >=40 mg/dL    LDL Cholesterol Calculated 50 <=100 mg/dL    Non HDL Cholesterol 66 <130 mg/dL    Patient Fasting > 8hrs? Yes     Narrative    Cholesterol  Desirable:  <200 mg/dL    Triglycerides  Normal:  Less than 150 mg/dL  Borderline High:  150-199 mg/dL  High:  200-499 mg/dL  Very High:  Greater than or equal to 500 mg/dL    Direct Measure HDL  Female:  Greater than or equal to 50 mg/dL   Male:  Greater than or equal to 40 mg/dL    LDL Cholesterol  Desirable:  <100mg/dL  Above Desirable:  100-129 mg/dL   Borderline High:  130-159 mg/dL   High:  160-189 mg/dL   Very High:  >= 190 mg/dL    Non HDL Cholesterol  Desirable:  130 mg/dL  Above Desirable:  130-159 mg/dL  Borderline High:  160-189 mg/dL  High:  190-219 mg/dL  Very High:  Greater than or equal to 220 mg/dL   PSA, screen     Status: Normal   Result Value Ref Range    Prostate Specific Antigen Screen 1.58 0.00 - 4.00 ug/L   Sex Hormone Binding Globulin     Status: Normal   Result Value Ref Range    Sex Hormone Binding  Globulin 28 11 - 80 nmol/L   Testosterone Free and Total     Status: None   Result Value Ref Range    Free Testosterone Calculated 7.66 ng/dL    Testosterone Total 349 240 - 950 ng/dL    Narrative    This test was developed and its performance characteristics determined by the [facility],  Special [facility] Laboratory. It has not been cleared or approved by the FDA. The laboratory is regulated under CLIA as qualified to perform high-complexity testing. This test is used for clinical purposes. It should not be regarded as investigational or for research.   Testosterone Free and Total     Status: None    Narrative    The following orders were created for panel order Testosterone Free and Total.  Procedure                               Abnormality         Status                     ---------                               -----------         ------                     Sex Hormone Binding Glob...[[id number]]  Normal              Final result               Testosterone Free and Total[[id number]]                      Final result                 Please view results for these tests on the individual orders.       ASSESSMENT/PLAN:     Routine general medical examination at a health care facility  - REVIEW OF HEALTH MAINTENANCE PROTOCOL ORDERS    Hypertension goal BP (blood pressure) < 140/90  - REVIEW OF HEALTH MAINTENANCE PROTOCOL ORDERS  - BASIC METABOLIC PANEL  - Albumin Random Urine Quantitative with Creat Ratio    Hyperlipidemia LDL goal <100  - Lipid panel reflex to direct LDL Fasting  - atorvastatin (LIPITOR) 10 MG tablet; Take 1 tablet (10 mg) by mouth daily    Screening for prostate cancer  - PSA, screen    Other male erectile dysfunction  - Testosterone Free and Total  - sildenafil (VIAGRA) 25 MG tablet; Take 1 tablet (25 mg) by mouth daily as needed (erectile dysfunction)      Patient has been advised of split billing requirements and indicates understanding: Yes      COUNSELING:    Special attention given to:        Regular exercise       Healthy diet/nutrition       Prostate cancer screening       The ASCVD Risk score (Susan THOMSON, et al., 2019) failed to calculate for the following reasons:    The valid total cholesterol range is 130 to 320 mg/dL      He reports that he has never smoked. He has never used smokeless tobacco.        Berry Lazo MD  [facility]

## 2023-04-07 LAB
TESTOST FREE SERPL-MCNC: 7.66 NG/DL
TESTOST SERPL-MCNC: 349 NG/DL (ref 240–950)

## 2023-04-17 ENCOUNTER — MYC MEDICAL ADVICE (OUTPATIENT)
Dept: FAMILY MEDICINE | Facility: CLINIC | Age: 63
End: 2023-04-17

## 2023-05-27 DIAGNOSIS — I10 HTN, GOAL BELOW 130/80: ICD-10-CM

## 2023-05-30 RX ORDER — METOPROLOL SUCCINATE 25 MG/1
TABLET, EXTENDED RELEASE ORAL
Qty: 90 TABLET | Refills: 1 | Status: SHIPPED | OUTPATIENT
Start: 2023-05-30 | End: 2023-10-31

## 2023-06-24 DIAGNOSIS — N40.1 BPH ASSOCIATED WITH NOCTURIA: Primary | ICD-10-CM

## 2023-06-24 DIAGNOSIS — R35.1 BPH ASSOCIATED WITH NOCTURIA: Primary | ICD-10-CM

## 2023-06-28 NOTE — TELEPHONE ENCOUNTER
alfuzosin ER (UROXATRAL) 10 MG 24 hr tablet (Discontinued  Take 1 tablet (10 mg) by mouth daily - Oral        Last Written Prescription Date: 6- to 04-   Last Fill Quantity: 90,   # refills: 3  Last Office Visit : 2-3-2023  Future Office visit:  6-    Routing refill request to provider for review/approval because:    Alpha Blockers Failed 06/24/2023 01:39 PM       Patient does not have Tadalafil, Vardenafil, or Sildenafil on their medication list    Medication is active on med list

## 2023-06-29 ENCOUNTER — TELEPHONE (OUTPATIENT)
Dept: UROLOGY | Facility: CLINIC | Age: 63
End: 2023-06-29

## 2023-06-29 ENCOUNTER — OFFICE VISIT (OUTPATIENT)
Dept: UROLOGY | Facility: CLINIC | Age: 63
End: 2023-06-29
Payer: COMMERCIAL

## 2023-06-29 DIAGNOSIS — N40.1 BPH ASSOCIATED WITH NOCTURIA: Primary | ICD-10-CM

## 2023-06-29 DIAGNOSIS — R35.1 BPH ASSOCIATED WITH NOCTURIA: Primary | ICD-10-CM

## 2023-06-29 PROCEDURE — 51798 US URINE CAPACITY MEASURE: CPT | Performed by: UROLOGY

## 2023-06-29 PROCEDURE — 99213 OFFICE O/P EST LOW 20 MIN: CPT | Mod: 25 | Performed by: UROLOGY

## 2023-06-29 RX ORDER — ALFUZOSIN HYDROCHLORIDE 10 MG/1
10 TABLET, EXTENDED RELEASE ORAL DAILY
Qty: 90 TABLET | Refills: 3 | Status: SHIPPED | OUTPATIENT
Start: 2023-06-29 | End: 2024-05-28

## 2023-06-29 NOTE — NURSING NOTE
Ruddy Aaron's chief complaint for this visit includes:  Chief Complaint   Patient presents with     RECHECK     AUA PVR BPH Return in about 1 year (around 2/3/2023).     PCP: Berry Lazo    post void residual: 13 ml      Referring Provider:  Referred Self, MD  No address on file    There were no vitals taken for this visit.  Data Unavailable        Allergies   Allergen Reactions     Lisinopril Cough         Do you need any medication refills at today's visit? No    Nella chamorro Clinic Visit Facilitator- Surgical Specialties

## 2023-06-29 NOTE — PROGRESS NOTES
"[facility] [place]   CHIEF COMPLAINT   It was my pleasure to see Ruddy Aaron who is a 62 year old male for follow-up of BPH with nocturia.      HPI   Ruddy Aaron is a very pleasant 62 year old male    Initially seen 11/30/21:  Ruddy Aaron is a 61 year old male who is being seen for evaluation of LUTS     Having strong urges to urinate every 1-2 hours during the day  Overnight, needing to void every 3-4 hours  Strong urges, at times will need to pee outside before going into his house  Drinks 2 cups of tea - one in the morning and one at night  Otherwise he drinks water     He was started on tamsulosin 0.4mg (Flomax) and he developed some abdominal pain  This was worse with movement, but also \"bladder tenderness\"  He stopped the tamsulosin 0.4mg (Flomax) and the pain resolved, still very mild tenderness      AUASS: 2-4-5-5-0-0-5 = 15  QOL = 3  PVR = 37     He works second shift for [employer]      No family history of prostate issues    2/3/22:  He was started on Alfuzosin at last visit  He has been doing well on this with no abdominal pain or issues    AUASS: 2-4-6-5-1-0-3 = 12  QOL = 1  PVR = 34cc    TODAY 6/29/2023:  Nocturia really flucuates  Some nights this will be great with 0 or 1 time waking, other times he will be about 4 times  This does not seem to correlate with fluid intake that he is aware of  He does have fluctuating stress from work and he notes the summer season is quite a busy season    AUASS: 0-3-3-0-3-0-4 = 13  QOL = 1/2  PVR = 13cc    PHYSICAL EXAM  Patient is a 62 year old  male   Vitals: There were no vitals taken for this visit.  There is no height or weight on file to calculate BMI.  General Appearance Adult:   Alert, no acute distress, oriented  HENT: throat/mouth:normal, good dentition  Lungs: no respiratory distress, or pursed lip breathing  Heart: No obvious jugular venous distension present  Abdomen: soft, nontender, no organomegaly or masses  Musculoskeltal: extremities normal, no " peripheral edema  Skin: no suspicious lesions or rashes  Neuro: Alert, oriented, speech and mentation normal  Psych: affect and mood normal  Gait: Normal    UA RESULTS:  Recent Labs   Lab Test 10/12/21  1137   COLOR Yellow   APPEARANCE Clear   URINEGLC Negative   URINEBILI Negative   URINEKETONE Negative   SG 1.020   UBLD Negative   URINEPH 6.0   PROTEIN Negative   UROBILINOGEN 0.2   NITRITE Negative   LEUKEST Negative   RBCU 0-2   WBCU 0-5        PSA   Date Value Ref Range Status   06/23/2015 1.10 0 - 4 ug/L Final     Prostate Specific Antigen Screen   Date Value Ref Range Status   04/05/2023 1.58 0.00 - 4.00 ug/L Final      Creatinine   Date Value Ref Range Status   04/05/2023 1.03 0.66 - 1.25 mg/dL Final   02/10/2021 1.15 0.66 - 1.25 mg/dL Final        IMAGING:  All pertinent imaging reviewed:     All imaging studies reviewed by me.  I personally reviewed these imaging films.  A formal report from radiology will follow.     CT ABD/PEL 10/18/21:  Findings:      Abdomen and pelvis: Hepatic parenchyma is diffusely hypoattenuating  with focal fatty sparing along the gallbladder fossa. Multiple tiny  hypoattenuating lesions, too small to characterize. Gallbladder is  partially dilated with mild wall thickening of the gallbladder fundus.  No pericholecystic fluid. No cholelithiasis. No intra- or extrahepatic  biliary dilation. Spleen, pancreas, and adrenal glands are  unremarkable. Kidneys demonstrate symmetric enhancement without solid  lesions, hydronephrosis, or nephrolithiasis. Ureters and urinary  bladder are unremarkable. Prostatomegaly.     Small hiatal hernia. Stomach and small intestine are otherwise  unremarkable. Colonic diverticulosis without evidence of acute  diverticulitis. Appendix is visualized and normal in caliber.      No suspicious abdominal or pelvic lymphadenopathy. No free fluid. No  pneumoperitoneum.     Abdominal aorta is normal in caliber and patent. Celiac and mesenteric  artery origins are  unremarkable. Portal veins and IVC are patent.     Lower thorax: Unremarkable.     Bones and soft tissues: No acute or suspicious osseous abnormality.  Soft tissue is unremarkable.                                                                      Impression:   1. Hepatic steatosis  2. Persistent focal gallbladder wall thickening as seen on ultrasound  10/13/2021, nonspecific, possibly focal asymmetric adenomyomatosis in  an asymptomatic patient. Recommend 6 month follow-up abdominal  ultrasound as previously recommended.            ASSESSMENT and PLAN  62-year-old man with symptoms of BPH and LUTS    BPH and LUTS  -Reviewed the pathophysiology of the bladder and the prostate and the normal changes associated with the development of BPH LUTS  -I reviewed his prior CT scan and reviewed these images personally and shared them with the patient.  We discussed that he does have evidence of slight prostatomegaly with a intravesical median lobe  -We discussed the mechanism of action of alfuzosin and he is doing well with this medication with no side effects  -We discussed the option for consideration of an OAB class medication for his urinary urgency, however at this time we will plan for continued observation of this  -Plan for follow-up in 1 year      Time spent: 20 minutes spent on the date of the encounter doing chart review, history and exam, documentation and further activities as noted above.    Alex White MD   Urology  [facility]  [employer] Phone: [phone]  [facility] Phone: [phone]

## 2023-06-29 NOTE — TELEPHONE ENCOUNTER
Left Voicemail (1st Attempt) for the patient to call back and schedule the following:    Appointment type: Return  Provider: Dr. White  Return date: 6/29/2024  Specialty phone number: [phone]  Additonal Notes: Follow up in 1 year with MARCELL and DANA chamorro Procedure   Dermatology, Surgery, Urology  [facility]

## 2023-06-30 RX ORDER — ALFUZOSIN HYDROCHLORIDE 10 MG/1
1 TABLET, EXTENDED RELEASE ORAL DAILY
Qty: 90 TABLET | Refills: 3 | Status: SHIPPED | OUTPATIENT
Start: 2023-06-30 | End: 2024-06-18

## 2023-08-11 ENCOUNTER — MYC MEDICAL ADVICE (OUTPATIENT)
Dept: FAMILY MEDICINE | Facility: CLINIC | Age: 63
End: 2023-08-11
Payer: COMMERCIAL

## 2023-08-11 DIAGNOSIS — L21.9 SEBORRHEIC DERMATITIS: ICD-10-CM

## 2023-08-11 DIAGNOSIS — R21 RASH AND NONSPECIFIC SKIN ERUPTION: ICD-10-CM

## 2023-08-11 RX ORDER — KETOCONAZOLE 20 MG/G
CREAM TOPICAL DAILY
Qty: 30 G | Refills: 11 | Status: SHIPPED | OUTPATIENT
Start: 2023-08-11 | End: 2024-08-13

## 2023-08-11 RX ORDER — HYDROCORTISONE VALERATE CREAM 2 MG/G
CREAM TOPICAL 2 TIMES DAILY
Qty: 15 G | Refills: 1 | Status: SHIPPED | OUTPATIENT
Start: 2023-08-11 | End: 2023-10-20

## 2023-08-11 NOTE — TELEPHONE ENCOUNTER
Routing to refill pool.    Kristina Kjellberg, MSN, RN  [facility] Primary Care Triage

## 2023-08-27 DIAGNOSIS — I10 HTN, GOAL BELOW 130/80: ICD-10-CM

## 2023-08-28 RX ORDER — TRIAMTERENE AND HYDROCHLOROTHIAZIDE 37.5; 25 MG/1; MG/1
1 CAPSULE ORAL EVERY MORNING
Qty: 90 CAPSULE | Refills: 3 | OUTPATIENT
Start: 2023-08-28

## 2023-08-28 RX ORDER — TRIAMTERENE AND HYDROCHLOROTHIAZIDE 37.5; 25 MG/1; MG/1
1 CAPSULE ORAL EVERY MORNING
Qty: 90 CAPSULE | Refills: 1 | OUTPATIENT
Start: 2023-08-28

## 2023-09-20 DIAGNOSIS — I10 HTN, GOAL BELOW 130/80: ICD-10-CM

## 2023-09-20 RX ORDER — TRIAMTERENE AND HYDROCHLOROTHIAZIDE 37.5; 25 MG/1; MG/1
1 CAPSULE ORAL EVERY MORNING
Qty: 90 CAPSULE | Refills: 1 | OUTPATIENT
Start: 2023-09-20

## 2023-09-29 ENCOUNTER — TELEPHONE (OUTPATIENT)
Dept: UROLOGY | Facility: CLINIC | Age: 63
End: 2023-09-29
Payer: COMMERCIAL

## 2023-09-29 NOTE — TELEPHONE ENCOUNTER
Left Voicemail (2nd Attempt) for the patient to call back and schedule the following:    Appointment type: Return  Provider: Dr. White  Return date: 6/29/2024  Specialty phone number: [phone]  Additonal Notes: Follow up in 1 year with MARCELL and DANA chamorro Procedure   Dermatology, Surgery, Urology  [facility]

## 2023-10-20 ENCOUNTER — IMMUNIZATION (OUTPATIENT)
Dept: NURSING | Facility: CLINIC | Age: 63
End: 2023-10-20
Payer: COMMERCIAL

## 2023-10-20 DIAGNOSIS — R21 RASH AND NONSPECIFIC SKIN ERUPTION: ICD-10-CM

## 2023-10-20 PROCEDURE — 90682 RIV4 VACC RECOMBINANT DNA IM: CPT

## 2023-10-20 PROCEDURE — 90471 IMMUNIZATION ADMIN: CPT

## 2023-10-20 RX ORDER — HYDROCORTISONE VALERATE CREAM 2 MG/G
CREAM TOPICAL 2 TIMES DAILY
Qty: 15 G | Refills: 1 | Status: SHIPPED | OUTPATIENT
Start: 2023-10-20

## 2023-10-31 ENCOUNTER — OFFICE VISIT (OUTPATIENT)
Dept: FAMILY MEDICINE | Facility: CLINIC | Age: 63
End: 2023-10-31
Payer: COMMERCIAL

## 2023-10-31 VITALS
HEART RATE: 88 BPM | HEIGHT: 71 IN | WEIGHT: 200.6 LBS | OXYGEN SATURATION: 99 % | TEMPERATURE: 97.7 F | BODY MASS INDEX: 28.08 KG/M2 | SYSTOLIC BLOOD PRESSURE: 118 MMHG | DIASTOLIC BLOOD PRESSURE: 84 MMHG | RESPIRATION RATE: 16 BRPM

## 2023-10-31 DIAGNOSIS — E66.3 OVERWEIGHT (BMI 25.0-29.9): ICD-10-CM

## 2023-10-31 DIAGNOSIS — Z29.11 NEED FOR RSV VACCINATION: ICD-10-CM

## 2023-10-31 DIAGNOSIS — H93.13 TINNITUS, BILATERAL: Primary | ICD-10-CM

## 2023-10-31 DIAGNOSIS — I10 HTN, GOAL BELOW 130/80: ICD-10-CM

## 2023-10-31 DIAGNOSIS — E78.5 HYPERLIPIDEMIA LDL GOAL <100: ICD-10-CM

## 2023-10-31 PROCEDURE — 99214 OFFICE O/P EST MOD 30 MIN: CPT | Mod: 25 | Performed by: INTERNAL MEDICINE

## 2023-10-31 PROCEDURE — 90678 RSV VACC PREF BIVALENT IM: CPT | Performed by: INTERNAL MEDICINE

## 2023-10-31 PROCEDURE — 90471 IMMUNIZATION ADMIN: CPT | Performed by: INTERNAL MEDICINE

## 2023-10-31 RX ORDER — TRIAMTERENE AND HYDROCHLOROTHIAZIDE 37.5; 25 MG/1; MG/1
1 CAPSULE ORAL EVERY MORNING
Qty: 90 CAPSULE | Refills: 1 | Status: SHIPPED | OUTPATIENT
Start: 2023-10-31 | End: 2024-05-20

## 2023-10-31 RX ORDER — ATORVASTATIN CALCIUM 10 MG/1
10 TABLET, FILM COATED ORAL DAILY
Qty: 90 TABLET | Refills: 3 | Status: SHIPPED | OUTPATIENT
Start: 2023-10-31 | End: 2024-09-13

## 2023-10-31 RX ORDER — METOPROLOL SUCCINATE 25 MG/1
25 TABLET, EXTENDED RELEASE ORAL DAILY
Qty: 90 TABLET | Refills: 1 | Status: SHIPPED | OUTPATIENT
Start: 2023-10-31 | End: 2024-03-25

## 2023-10-31 RX ORDER — PHENTERMINE HYDROCHLORIDE 15 MG/1
15 CAPSULE ORAL EVERY MORNING
Qty: 30 CAPSULE | Refills: 5 | Status: SHIPPED | OUTPATIENT
Start: 2023-10-31

## 2023-10-31 ASSESSMENT — PAIN SCALES - GENERAL: PAINLEVEL: NO PAIN (0)

## 2023-10-31 NOTE — PROGRESS NOTES
[facility] INTERNAL MEDICINE NOTE    Ruddy Aaron is a 63 year old male who presents to clinic today for the following health issues:    Ear concerns  Onset: > one year.  Description: ringing in both ears  Intensity: moderate  Progression of Symptoms:  worse  Accompanying Signs & Symptoms: hearing loss without vertigo  Previous history of similar problem: none  Precipitating factors:        Worsened by: unknown, but patient has chronic hypertension.  Alleviating factors:        Improved by: nothing  Therapies tried and outcome: None.     Patient Active Problem List   Diagnosis    CARDIOVASCULAR SCREENING; LDL GOAL LESS THAN 130    Headaches, tension-type    Chronic eczema    Hypertension goal BP (blood pressure) < 140/90    Seasonal allergic rhinitis    Overweight (BMI 25.0-29.9)    Neck pain    Nonallergic rhinitis    Combined forms of age-related cataract, mild, of both eyes    Seborrheic dermatitis    Hyperlipidemia LDL goal <100    Upper airway cough syndrome    Benign prostatic hyperplasia with urinary frequency    Chronic cough     Past Surgical History:   Procedure Laterality Date    VASECTOMY         Social History     Tobacco Use    Smoking status: Never    Smokeless tobacco: Never   Substance Use Topics    Alcohol use: Not Currently     Alcohol/week: 1.0 standard drink of alcohol     Types: 1 Standard drinks or equivalent per week     Comment: ocassional     Family History   Problem Relation Age of Onset    Hypertension Father     Eye Disorder Mother     Eye Disorder Other         mother's side of family has cataracts, and glaucoma?    Asthma No family hx of     C.A.D. No family hx of     Diabetes No family hx of     Cerebrovascular Disease No family hx of     Breast Cancer No family hx of     Cancer - colorectal No family hx of     Prostate Cancer No family hx of          Allergies   Allergen Reactions    Lisinopril Cough     Recent Labs  "  Lab Test 04/05/23  0802 02/10/22  0901 02/10/21  1018 08/26/20  1052 07/06/20  1220   A1C  --   --   --   --  5.3   LDL 50 59 65 124*  --    HDL 50 58 50 56  --    TRIG 82 69 84 99  --    ALT  --  46 59 67  --    CR 1.03 1.08 1.15  --  1.07   GFRESTIMATED 83 79 70  --  76   GFRESTBLACK  --   --  81  --  88   POTASSIUM 3.9 4.0 3.7  --  4.0      BP Readings from Last 3 Encounters:   10/31/23 118/84   04/05/23 116/79   02/10/22 138/88    Wt Readings from Last 3 Encounters:   10/31/23 91 kg (200 lb 9.6 oz)   04/05/23 84 kg (185 lb 3.2 oz)   02/10/22 85.8 kg (189 lb 4 oz)         ROS:  C: NEGATIVE for fever, chills. POSITIVE for weight gain.  I: NEGATIVE for worrisome rashes, moles or lesions  E: NEGATIVE for vision changes or irritation  E/M: NEGATIVE for ear, mouth and throat problems  R: NEGATIVE for significant cough or SOB  B: NEGATIVE for masses, tenderness or discharge  CV: NEGATIVE for chest pain, palpitations or peripheral edema  GI: NEGATIVE for nausea, abdominal pain, heartburn, or change in bowel habits  : NEGATIVE for frequency, dysuria, or hematuria  M: NEGATIVE for significant arthralgias or myalgia  N: NEGATIVE for weakness, dizziness or paresthesias  E: NEGATIVE for temperature intolerance, skin/hair changes  H: NEGATIVE for bleeding problems  P: NEGATIVE for changes in mood or affect    OBJECTIVE:                                                    /84 (BP Location: Left arm, Patient Position: Sitting, Cuff Size: Adult Large)   Pulse 88   Temp 97.7  F (36.5  C) (Tympanic)   Resp 16   Ht 1.803 m (5' 11\")   Wt 91 kg (200 lb 9.6 oz)   SpO2 99%   BMI 27.98 kg/m    Body mass index is 27.98 kg/m .  GENERAL: healthy, alert and no distress  EYES: Eyes grossly normal to inspection and conjunctivae and sclerae normal  HENT: normal cephalic/atraumatic and oral mucous membranes moist  RESP: lungs clear to auscultation - no rales, rhonchi or wheezes  CV: regular rates and rhythm and no peripheral " edema  MS: no gross musculoskeletal defects noted, no edema  NEURO: Normal strength and tone, mentation intact and speech normal  PSYCH: mentation appears normal, affect normal/bright    Diagnostic Test Results:  No results found for any visits on 10/31/23.     ASSESSMENT/PLAN:                                                      Tinnitus, bilateral  - Adult ENT  Referral; Future    Overweight (BMI 25.0-29.9)  - phentermine (ADIPEX-P) 15 MG capsule; Take 1 capsule (15 mg) by mouth every morning    HTN, goal below 130/80  - metoprolol succinate ER (TOPROL XL) 25 MG 24 hr tablet; Take 1 tablet (25 mg) by mouth daily  - triamterene-HCTZ (DYAZIDE) 37.5-25 MG capsule; Take 1 capsule by mouth every morning    Hyperlipidemia LDL goal <100  - atorvastatin (LIPITOR) 10 MG tablet; Take 1 tablet (10 mg) by mouth daily    Need for RSV vaccination  - RSV VACCINE (ABRYSVO)     Disposition:  Follow-up in 24 weeks.    Berry Lazo MD  [facility]

## 2023-11-01 RX ORDER — TRIAMTERENE AND HYDROCHLOROTHIAZIDE 37.5; 25 MG/1; MG/1
1 CAPSULE ORAL EVERY MORNING
Qty: 90 CAPSULE | Refills: 1 | OUTPATIENT
Start: 2023-11-01

## 2023-11-19 PROBLEM — H93.13 TINNITUS, BILATERAL: Status: ACTIVE | Noted: 2023-11-19

## 2023-11-21 ENCOUNTER — OFFICE VISIT (OUTPATIENT)
Dept: OPHTHALMOLOGY | Facility: CLINIC | Age: 63
End: 2023-11-21
Payer: COMMERCIAL

## 2023-11-21 DIAGNOSIS — H52.4 PRESBYOPIA: ICD-10-CM

## 2023-11-21 DIAGNOSIS — Z01.01 ENCOUNTER FOR EXAMINATION OF EYES AND VISION WITH ABNORMAL FINDINGS: ICD-10-CM

## 2023-11-21 DIAGNOSIS — H25.813 COMBINED FORMS OF AGE-RELATED CATARACT OF BOTH EYES: Primary | ICD-10-CM

## 2023-11-21 PROCEDURE — 92004 COMPRE OPH EXAM NEW PT 1/>: CPT | Performed by: OPHTHALMOLOGY

## 2023-11-21 PROCEDURE — 92015 DETERMINE REFRACTIVE STATE: CPT | Performed by: OPHTHALMOLOGY

## 2023-11-21 ASSESSMENT — VISUAL ACUITY
OS_SC: 20/25
OS_CC: 20/20
CORRECTION_TYPE: GLASSES
OS_SC+: -1
OD_CC+: -3
OD_SC: 20/25
OS_CC+: -2
METHOD: SNELLEN - LINEAR
OD_CC: 20/20

## 2023-11-21 ASSESSMENT — EXTERNAL EXAM - RIGHT EYE: OD_EXAM: 1+ BROW PTOSIS

## 2023-11-21 ASSESSMENT — CONF VISUAL FIELD
OS_INFERIOR_NASAL_RESTRICTION: 0
OD_INFERIOR_TEMPORAL_RESTRICTION: 0
OD_NORMAL: 1
OS_SUPERIOR_NASAL_RESTRICTION: 0
OS_INFERIOR_TEMPORAL_RESTRICTION: 0
OS_SUPERIOR_TEMPORAL_RESTRICTION: 0
OD_SUPERIOR_NASAL_RESTRICTION: 0
METHOD: COUNTING FINGERS
OD_SUPERIOR_TEMPORAL_RESTRICTION: 0
OS_NORMAL: 1
OD_INFERIOR_NASAL_RESTRICTION: 0

## 2023-11-21 ASSESSMENT — REFRACTION_MANIFEST
OS_ADD: +2.75
OD_SPHERE: PLANO
OS_SPHERE: PLANO
OD_ADD: +2.75

## 2023-11-21 ASSESSMENT — TONOMETRY
IOP_METHOD: APPLANATION
OD_IOP_MMHG: 11
OS_IOP_MMHG: 11

## 2023-11-21 ASSESSMENT — REFRACTION_WEARINGRX
OD_ADD: +2.50
OS_CYLINDER: SPHERE
OD_SPHERE: +0.25
OS_SPHERE: +0.25
OS_ADD: +2.50
OD_CYLINDER: SPHERE

## 2023-11-21 ASSESSMENT — SLIT LAMP EXAM - LIDS
COMMENTS: 1+ DERMATOCHALASIS
COMMENTS: 1+ DERMATOCHALASIS

## 2023-11-21 ASSESSMENT — CUP TO DISC RATIO
OS_RATIO: 0.3
OD_RATIO: 0.3

## 2023-11-21 ASSESSMENT — EXTERNAL EXAM - LEFT EYE: OS_EXAM: 1+ BROW PTOSIS

## 2023-11-21 NOTE — LETTER
"    11/21/2023         RE: Ruddy Aaron  [address]  [facility] [address]        Dear Colleague,    Thank you for referring your patient, Ruddy Aaron, to the [facility]. Please see a copy of my visit note below.     Current Eye Medications:  None     Subjective:  Complete eye exam. Vision is doing fine both eyes in the distance. Having trouble reading small print up close, Using OTC +1.75 readers currently. Feels they may need to be stronger. No eye pain or discomfort in either eye. Has ringing in ears, slowly getting worse. Has appointment set up in couple months for it.   Patients mother had RD, so patient has eyes checked every year.      Objective:  See Ophthalmology Exam.       Assessment:  New baseline eye exam in patient with stable mild cataract both eyes.      ICD-10-CM    1. Combined forms of age-related cataract, mild, of both eyes  H25.813       2. Encounter for examination of eyes and vision with abnormal findings  Z01.01       3. Presbyopia  H52.4            Plan:  Glasses prescription given - optional    May use artificial tears up to four times a day (like Refresh Optive, Systane Balance, or TheraTears. Avoid \"get the red out\" drops and generic artifical tears).     Possible posterior vitreous detachment (sudden onset large floater and/or flashing lights) both eyes discussed.     Call in July 2024 for an appointment in November 2024 for Complete Exam    Dr. Stevens [phone]         Again, thank you for allowing me to participate in the care of your patient.        Sincerely,        Reinaldo Stevens MD  "

## 2023-11-21 NOTE — PROGRESS NOTES
" Current Eye Medications:  None     Subjective:  Complete eye exam. Vision is doing fine both eyes in the distance. Having trouble reading small print up close, Using OTC +1.75 readers currently. Feels they may need to be stronger. No eye pain or discomfort in either eye. Has ringing in ears, slowly getting worse. Has appointment set up in couple months for it.   Patients mother had RD, so patient has eyes checked every year.      Objective:  See Ophthalmology Exam.       Assessment:  New baseline eye exam in patient with stable mild cataract both eyes.      ICD-10-CM    1. Combined forms of age-related cataract, mild, of both eyes  H25.813       2. Encounter for examination of eyes and vision with abnormal findings  Z01.01       3. Presbyopia  H52.4            Plan:  Glasses prescription given - optional    May use artificial tears up to four times a day (like Refresh Optive, Systane Balance, or TheraTears. Avoid \"get the red out\" drops and generic artifical tears).     Possible posterior vitreous detachment (sudden onset large floater and/or flashing lights) both eyes discussed.     Call in July 2024 for an appointment in November 2024 for Complete Exam    Dr. Stevens [phone]         "

## 2023-11-21 NOTE — PATIENT INSTRUCTIONS
"Glasses prescription given - optional    May use artificial tears up to four times a day (like Refresh Optive, Systane Balance, or TheraTears. Avoid \"get the red out\" drops and generic artifical tears).     Possible posterior vitreous detachment (sudden onset large floater and/or flashing lights) both eyes discussed.     Call in July 2024 for an appointment in November 2024 for Complete Exam    Dr. Stevens [phone]    "

## 2024-01-22 ENCOUNTER — VIRTUAL VISIT (OUTPATIENT)
Dept: URGENT CARE | Facility: CLINIC | Age: 64
End: 2024-01-22
Payer: COMMERCIAL

## 2024-01-22 ENCOUNTER — MYC MEDICAL ADVICE (OUTPATIENT)
Dept: FAMILY MEDICINE | Facility: CLINIC | Age: 64
End: 2024-01-22

## 2024-01-22 ENCOUNTER — TELEPHONE (OUTPATIENT)
Dept: FAMILY MEDICINE | Facility: CLINIC | Age: 64
End: 2024-01-22

## 2024-01-22 ENCOUNTER — NURSE TRIAGE (OUTPATIENT)
Dept: FAMILY MEDICINE | Facility: CLINIC | Age: 64
End: 2024-01-22

## 2024-01-22 DIAGNOSIS — R52 BODY ACHES: ICD-10-CM

## 2024-01-22 DIAGNOSIS — R05.1 ACUTE COUGH: ICD-10-CM

## 2024-01-22 DIAGNOSIS — J34.89 STUFFY AND RUNNY NOSE: ICD-10-CM

## 2024-01-22 DIAGNOSIS — J00 COMMON COLD VIRUS: Primary | ICD-10-CM

## 2024-01-22 DIAGNOSIS — R68.83 CHILLS: ICD-10-CM

## 2024-01-22 PROCEDURE — 99213 OFFICE O/P EST LOW 20 MIN: CPT | Mod: 95

## 2024-01-22 RX ORDER — OSELTAMIVIR PHOSPHATE 75 MG/1
75 CAPSULE ORAL 2 TIMES DAILY
Qty: 10 CAPSULE | Refills: 0 | Status: SHIPPED | OUTPATIENT
Start: 2024-01-22 | End: 2024-01-27

## 2024-01-22 NOTE — PROGRESS NOTES
Ruddy is a 63 year old male who presents for a billable video visit.    ASSESSMENT/PLAN:    ICD-10-CM    1. Common cold virus  J00       2. Acute cough  R05.1 Symptomatic COVID-19 Virus (Coronavirus) by PCR Nose     Influenza A & B Antigen     oseltamivir (TAMIFLU) 75 MG capsule      3. Stuffy and runny nose  J34.89 Symptomatic COVID-19 Virus (Coronavirus) by PCR Nose     Influenza A & B Antigen     oseltamivir (TAMIFLU) 75 MG capsule      4. Body aches  R52 Symptomatic COVID-19 Virus (Coronavirus) by PCR Nose     Influenza A & B Antigen     oseltamivir (TAMIFLU) 75 MG capsule      5. Chills  R68.83 Symptomatic COVID-19 Virus (Coronavirus) by PCR Nose     Influenza A & B Antigen     oseltamivir (TAMIFLU) 75 MG capsule          Labs were placed for Flu and COVID. He is unable to complete tests today due to lab availability. He was able to schedule for tomorrow. We discussed that if Influenza is + Tamiflu was sent to local pharmacy already and he can start medication right away. If COVID is + he will be notified next steps via phone call or MyChart for possible treatment; if unable to be reached I have advised him to call nurse line. OTC measures outlined in AVS and discussed      Follow up with primary care provider with any problems, questions or concerns or if symptoms worsen or fail to improve. Patient verbalized understanding and is agreeable to plan.       SUBJECTIVE:  Ruddy Aaron is a 63 year old who presents for complaint of chills, runny nose, stuffy nose, cough - non-productive, and body aches.  Onset was 1 day(s) ago.   Denies the following symptoms: wheezing, shortness of breath, vomiting, and diarrhea  Course of illness is same.    Treatment measures tried include Decongestants and OTC Cough med with some relief of symptoms.      Review of Systems  All systems reviewed and negative except per HPI.      OBJECTIVE:  Vitals not done due to this being a virtual visit    GENERAL: alert and no distress  EYES:  Eyes grossly normal to inspection.  No discharge or erythema, or obvious scleral/conjunctival abnormalities.  RESP: No audible wheeze, cough, or visible cyanosis.    SKIN: Visible skin clear. No significant rash, abnormal pigmentation or lesions.  PSYCH: Appropriate affect, tone, and pace of words    Video-Visit Details    Type of service:  Video Visit  Video Start Time: 3:55 PM  Video End Time: 4:07 PM    Originating Location (pt. Location): Home    Distant Location (provider location):  [facility]     Platform used for Video Visit: [employer]

## 2024-01-22 NOTE — PATIENT INSTRUCTIONS
Thank you being seen in [facility]. Today you were tested for Covid-19. Please be aware that if your Covid results come back positive, a [facility] team member will contact you via phone or [employer] regarding next steps within 48hrs. If your results come back negative and you are not feeling better, please contact your PC provider on next steps    Adult Self-Care for Colds  Colds are caused by viruses. They can t be cured with antibiotics. However, you can relieve symptoms and support your body s efforts to heal itself. No matter which symptoms you have, be sure to drink plenty of fluids (water or clear soup); stop smoking and drinking alcohol; and get plenty of rest.      Understand a fever  Relax, lie down. Go to bed if you want. Just get off your feet and rest. Also, drink plenty of fluids to avoid dehydration.  Take acetaminophen or a nonsteroidal anti-inflammatory agent (NSAID), such as ibuprofen.  A fever is a normal reaction of your body to an illness. The temperature itself often isn t harmful. It actually helps your body fight infections. You don t need to treat a fever unless you feel very uncomfortable.   If the fever doesn t get better within 1 hour after you take acetaminophen, take ibuprofen. If this works, keep taking the ibuprofen every 6 to 8 hours.   If either medicine alone doesn t keep the fever down, you may switch off between the 2 medicines every 3 to 4 hours. For example, take ibuprofen. Wait 3 hours. Then take acetaminophen. Wait 3 hours. Take ibuprofen, and so on.  Treat a troubled nose kindly  Breathe steam or heated humidified air to open blocked nasal passages.  a hot shower or use a vaporizer. Be careful not to get burned by the steam.  Saline nasal sprays and decongestant tablets help open a stuffy nose. Antihistamines (Claritin, Zyrtec, etc.) can also help, but they can cause side effects such as drowsiness and drying of the eyes, nose, and mouth.  Nasal rinses such as Netti pot will  help with the sinus congestion and nasal drainage.   Soothe a sore throat and cough  Gargle every 2 hours with 1/4 teaspoon of salt dissolved in 1/2 cup of warm water. Suck on throat lozenges and cough drops to moisten your throat.  Gargling with Chloraseptic spray   Cough medicines are available but it is unclear how effective they actually are.  Manunancy Honey tbs for cough suppressant   Take acetaminophen or an NSAID, such as ibuprofen to ease throat pain  Humidifier in room where you are sleeping.   Ease digestive problems  Put fluid back into your body. Take frequent sips of clear liquids such as water or broth. Do not drink beverages with a lot of sugar in them, such as juices and sodas. These can make diarrhea worse. Older children and adults can drink sports drinks.  Patient will need to drink at least 1.5-2 liters of fluids daily for adults and 1-1.5 liters for children. If vomiting and not tolerating liquids for more than 24 hrs, please go to your nearest emergency department for IV fluids and further treatment.   Maintain hydration by drinking small amounts of clear fluids frequently, then soft diet, and then advance diet as tolerated. May use any prescribed imodium if desired for any diarrhea. Call if symptoms worsen, high fever, severe weakness or fainting, increased abdominal pain, blood in stool or vomit, or failure to improve in 2-3 days.   As your appetite returns, you can resume your normal diet. Ask your doctor whether there are any foods you should avoid.  When to seek medical care  When you first notice symptoms, ask your health care provider if antiviral medications are appropriate. Antibiotics should not be taken for colds or flu. Also, call your doctor if you have any of the following symptoms or if you aren t feeling better after 7 days:  Shortness of breath  Pain or pressure in the chest or abdomen  Worsening symptoms, especially after a period of improvement  Fever that doesn t go down with  medication  Sudden dizziness or confusion  Severe or continued vomiting  Signs of dehydration, including extreme thirst, dark urine, infrequent urination, dry mouth  Spotted, red, or very sore throat

## 2024-01-22 NOTE — TELEPHONE ENCOUNTER
"Call received from patient requesting to speak to a \"Livia Patel\". Advised that we do not have an individual by that name at [facility]. Per patient the call he received came from [phone]. Writer pulled up patients chart and see he completed a virtual UC visit in regards to strep. Patient was advised by UC provider to go to nearest lab to complete.  Patient was turned away by staff saying they do not have openings.   Was empathetic to patient and apologized that he was told to simply \"walk in\" and that due to staffing getting scheduled for a visit was the best way to avoid this situation. Offered to give patient relations number as writer could not assist from a complete outside clinic to situation and patient did not answer and thanked writer for her help before disconnecting call  "

## 2024-01-22 NOTE — TELEPHONE ENCOUNTER
"Reason for Disposition   Patient wants to be seen    Additional Information   Negative: SEVERE difficulty breathing (e.g., struggling for each breath, speaks in single words)   Negative: Bluish (or gray) lips or face   Negative: Shock suspected (e.g., cold/pale/clammy skin, too weak to stand, low BP, rapid pulse)   Negative: Sounds like a life-threatening emergency to the triager   Negative: Symptoms of COVID-19 (e.g., cough, fever, SOB, or others) and lives in an area with community spread   Negative: Sounds like a cold and there is no fever   Negative: Cough and there is no fever   Negative: Severe cough   Negative: Throat pain and there is no fever   Negative: Severe sore throat   Negative: Influenza vaccine reaction is suspected   Negative: Headache and stiff neck (can't touch chin to chest)   Negative: Chest pain  (Exception: MILD central chest pain, present only when coughing.)   Negative: Difficulty breathing that is not severe and not relieved by cleaning out the nose   Negative: Patient sounds very sick or weak to the triager   Negative: Fever > 104 F (40 C)   Negative: Fever > 101 F (38.3 C) and over 60 years of age   Negative: Fever > 100.0 F (37.8 C) and diabetes mellitus or weak immune system (e.g., HIV positive, cancer chemo, splenectomy, organ transplant, chronic steroids)   Negative: Fever > 100.0 F (37.8 C) and bedridden (e.g., CVA, chronic illness, recovering from surgery)   Negative: Using nasal washes and pain medicine > 24 hours and sinus pain (lower forehead, cheekbone, or eye) persists   Negative: Fever present > 3 days (72 hours)   Negative: Fever returns after gone for over 24 hours and symptoms worse (or not improved)   Negative: Earache    Answer Assessment - Initial Assessment Questions  1. WORST SYMPTOM: \"What is your worst symptom?\" (e.g., cough, runny nose, muscle aches, headache, sore throat, fever)       Cough, Runny Nose, Chills and Body Aches  2. ONSET: \"When did your flu symptoms " "start?\"       2 days ago  3. COUGH: \"How bad is the cough?\"        mild  4. RESPIRATORY DISTRESS: \"Describe your breathing.\"       Denies SOB, difficulty breathing. States breathing is normal.   5. FEVER: \"Do you have a fever?\" If Yes, ask: \"What is your temperature, how was it measured, and when did it start?\"      No fever  6. EXPOSURE: \"Were you exposed to someone with influenza?\"        Pt does not know for sure, but recently travel out of state for work  7. FLU VACCINE: \"Did you get a flu shot this year?\"      yes  8. HIGH RISK DISEASE: \"Do you have any chronic medical problems?\" (e.g., heart or lung disease, asthma, weak immune system, or other HIGH RISK conditions)      no  9. PREGNANCY: \"Is there any chance you are pregnant?\" \"When was your last menstrual period?\"      no  10. OTHER SYMPTOMS: \"Do you have any other symptoms?\"  (e.g., runny nose, muscle aches, headache, sore throat)        Runny nose, muscles aches.     Taking tylenol and OTC cough medicine, but requesting tamiflu. Has not taken covid test.    Protocols used: Influenza (Flu) - Seasonal-A-OH    "

## 2024-01-23 ENCOUNTER — LAB (OUTPATIENT)
Dept: LAB | Facility: CLINIC | Age: 64
End: 2024-01-23
Attending: FAMILY MEDICINE
Payer: COMMERCIAL

## 2024-01-23 DIAGNOSIS — J34.89 STUFFY AND RUNNY NOSE: ICD-10-CM

## 2024-01-23 DIAGNOSIS — R68.83 CHILLS: ICD-10-CM

## 2024-01-23 DIAGNOSIS — R05.1 ACUTE COUGH: ICD-10-CM

## 2024-01-23 DIAGNOSIS — R52 BODY ACHES: ICD-10-CM

## 2024-01-23 LAB
FLUAV AG SPEC QL IA: NEGATIVE
FLUBV AG SPEC QL IA: NEGATIVE
SARS-COV-2 RNA RESP QL NAA+PROBE: POSITIVE

## 2024-01-23 PROCEDURE — 87804 INFLUENZA ASSAY W/OPTIC: CPT

## 2024-01-23 PROCEDURE — 87635 SARS-COV-2 COVID-19 AMP PRB: CPT

## 2024-01-24 ENCOUNTER — TELEPHONE (OUTPATIENT)
Dept: NURSING | Facility: CLINIC | Age: 64
End: 2024-01-24
Payer: COMMERCIAL

## 2024-01-24 NOTE — TELEPHONE ENCOUNTER
Coronavirus (COVID-19) Notification    Caller Name (Patient, parent, daughter/son, grandparent, etc)  Patient     Reason for call  Notify of Positive Coronavirus (COVID-19) lab results, assess symptoms,  review [facility] recommendations    Lab Result    Lab test:  2019-nCoV rRt-PCR or SARS-CoV-2 PCR    Oropharyngeal AND/OR nasopharyngeal swabs is POSITIVE for 2019-nCoV RNA/SARS-COV-2 PCR (COVID-19 virus)      Gather patient reported symptoms   Assessment   Current Symptoms at time of phone call, reported by patient: (if no symptoms, document: No symptoms] Green phlegm and cough   Date of symptom(s) onset (if applicable) 1/21/24     If at time of call, Patients symptoms have worsened, the Patient should contact 911 or have someone drive them to Emergency Dept promptly:    If Patient calling 911, inform 911 personal that you have tested positive for the Coronavirus (COVID-19).  Place mask on and await 911 to arrive.  If Emergency Dept, If possible, please have another adult drive you to the Emergency Dept but you need to wear mask when in contact with other people.      Treatment Options:   Is patient interested in discussing COVID treatment? No.  Pt decline, but will call back if he wants treatment.      Review information with Patient    Your result was positive. This means you have COVID-19 (coronavirus).    How can I protect others?    These guidelines are for isolating before returning to work, school or .    If you DO have symptoms  Stay home and away from others   For at least 5 days after your symptoms started, AND  You are fever free for 24 hours (with no medicine that reduces fever), AND  Your other symptoms are better  Wear a mask for 10 full days anytime you are around others    If you DON'T have symptoms  Stay home and away from others for at least 5 days after your positive test  Wear a mask for 10 full days anytime you are around others    There may be different guidelines for healthcare  facilities.  Please check with the specific sites before arriving.    If you have been told by a doctor that you were severely ill with COVID-19 or are immunocompromised, you should isolate for at least 10 days.    You should not go back to work until you meet the guidelines above for ending your home isolation. You don't need to be retested for COVID-19 before going back to work--studies show that you won't spread the virus if it's been at least 10 days since your symptoms started (or 20 days, if you have a weak immune system).    Employers, schools, and daycares: This is an official notice for this person's medical guidelines for returning in-person.  They must meet the above guidelines before going back to work, school or  in person.    You will receive a positive COVID-19 letter via [employer] or the mail soon with additional self-care information.    Would you like me to review some of that information with you now?  No    If you were tested for an upcoming procedure, please contact your provider for next steps.    Reese Cowan

## 2024-02-20 ENCOUNTER — OFFICE VISIT (OUTPATIENT)
Dept: AUDIOLOGY | Facility: CLINIC | Age: 64
End: 2024-02-20
Payer: COMMERCIAL

## 2024-02-20 ENCOUNTER — OFFICE VISIT (OUTPATIENT)
Dept: OTOLARYNGOLOGY | Facility: CLINIC | Age: 64
End: 2024-02-20
Payer: COMMERCIAL

## 2024-02-20 VITALS — SYSTOLIC BLOOD PRESSURE: 118 MMHG | OXYGEN SATURATION: 96 % | DIASTOLIC BLOOD PRESSURE: 76 MMHG | HEART RATE: 81 BPM

## 2024-02-20 DIAGNOSIS — J34.3 NASAL TURBINATE HYPERTROPHY: Primary | ICD-10-CM

## 2024-02-20 DIAGNOSIS — J30.1 ALLERGIC RHINITIS DUE TO POLLEN, UNSPECIFIED SEASONALITY: ICD-10-CM

## 2024-02-20 DIAGNOSIS — H90.3 SNHL (SENSORY-NEURAL HEARING LOSS), ASYMMETRICAL: Primary | ICD-10-CM

## 2024-02-20 DIAGNOSIS — H93.13 TINNITUS, BILATERAL: ICD-10-CM

## 2024-02-20 PROCEDURE — 92557 COMPREHENSIVE HEARING TEST: CPT | Performed by: AUDIOLOGIST

## 2024-02-20 PROCEDURE — 99214 OFFICE O/P EST MOD 30 MIN: CPT | Performed by: OTOLARYNGOLOGY

## 2024-02-20 PROCEDURE — 92550 TYMPANOMETRY & REFLEX THRESH: CPT | Performed by: AUDIOLOGIST

## 2024-02-20 RX ORDER — FLUTICASONE PROPIONATE 50 MCG
1 SPRAY, SUSPENSION (ML) NASAL DAILY
Qty: 16 G | Refills: 3 | Status: SHIPPED | OUTPATIENT
Start: 2024-02-20 | End: 2024-04-17

## 2024-02-20 ASSESSMENT — ENCOUNTER SYMPTOMS
HEARTBURN: 1
CONSTITUTIONAL NEGATIVE: 1
EYES NEGATIVE: 1
DIZZINESS: 0
COUGH: 1

## 2024-02-20 NOTE — NURSING NOTE
Ruddy Aaron's goals for this visit include:   Chief Complaint   Patient presents with    Consult     Tinnitus, bilateral ears with decreased hearing. Onset x 1 yr, worsened x 6 mo. Treatments tried none.     He requests these members of his care team be copied on today's visit information: yes    PCP: Berry Lazo    Referring Provider:  Berry Lazo MD  [id number] [place] [address]    /76 (BP Location: Left arm, Patient Position: Sitting, Cuff Size: Adult Large)   Pulse 81   SpO2 96%     Do you need any medication refills at today's visit? no    Abdias Cowan, CMA

## 2024-02-20 NOTE — PROGRESS NOTES
AUDIOLOGY REPORT    SUMMARY: Audiology visit completed. See audiogram for results.    RECOMMENDATIONS: Follow-up with ENT.    Blaise Scruggs  Doctor of Audiology  [place] License # [id number]

## 2024-02-20 NOTE — PROGRESS NOTES
Chief Complaint   Patient presents with    Consult      PCP: Berry Lazo     Referring Provider: Berry Lazo    /76 (BP Location: Left arm, Patient Position: Sitting, Cuff Size: Adult Large)   Pulse 81   SpO2 96%     ENT Problem List:  Patient Active Problem List   Diagnosis Code    CARDIOVASCULAR SCREENING; LDL GOAL LESS THAN 130 Z13.6    Headaches, tension-type G44.209    Chronic eczema L30.9    HTN, goal below 130/80 I10    Seasonal allergic rhinitis J30.2    Overweight (BMI 25.0-29.9) E66.3    Neck pain M54.2    Nonallergic rhinitis J31.0    Combined forms of age-related cataract, mild, of both eyes H25.813    Seborrheic dermatitis L21.9    Hyperlipidemia LDL goal <100 E78.5    Upper airway cough syndrome R05.8    Benign prostatic hyperplasia with urinary frequency N40.1, R35.0    Chronic cough R05.3    Tinnitus, bilateral H93.13      Current Medications:  Current Outpatient Medications   Medication    alfuzosin ER (UROXATRAL) 10 MG 24 hr tablet    alfuzosin ER (UROXATRAL) 10 MG 24 hr tablet    ASPIRIN 81 PO    atorvastatin (LIPITOR) 10 MG tablet    glucosamine-chondroitin 500-400 MG CAPS per capsule    hydrocortisone (WESTCORT) 0.2 % external cream    ketoconazole (NIZORAL) 2 % external cream    metoprolol succinate ER (TOPROL XL) 25 MG 24 hr tablet    phentermine (ADIPEX-P) 15 MG capsule    sildenafil (VIAGRA) 25 MG tablet    triamterene-HCTZ (DYAZIDE) 37.5-25 MG capsule     No current facility-administered medications for this visit.     CT SINUS W/O CONTRAST 4/21/2021     History: Sinusitis, chronic or recurrent; sinonasal symptoms, possible surgery - [facility] image-guidance protocol; PND (post-nasal drip); Chronic cough     Comparison: Normal      Technique:  Using thin collimation multidetector helical acquisition technique, axial, coronal, and sagittal thin section CT images were reconstructed through the paranasal sinuses. Images were reviewed in bone and soft tissue windows.      Findings: Paranasal sinuses are clear     The ostiomeatal units appear patent bilaterally. The bony walls of the paranasal sinuses are intact.     The adenoid tonsils in the nasopharynx are unremarkable.                                                             Impression:  No evidence of sinusitis.    FL Esophagram Double Contrast 4/19/23    Impression:  1-2 cm sliding hiatal hernia. Spontaneous gastroesophageal reflux reached the upper thoracic  esophagus during the exam.    COMPARISON: None    FINDINGS:  The esophagus is normal caliber. A 13 mm barium tablet passed into the stomach.    Normal appearance of the primary esophageal peristaltic wave.    1-2 cm sliding hiatal hernia.    Spontaneous gastroesophageal reflux reached the upper thoracic esophagus during the exam.    XR CHEST 2 VIEWS 4/19/23    Impression:  Mild subsegmental atelectasis or scarring in the right base has increased. Chest  otherwise negative. No significant change since 07/18/2022.    HPI  Pleasant 63 year old male presents today as a(n) new patient for bilateral tinnitus with decreased hearing that onset 1 year ago, and worsened the past 6 months. He has year round allergies and currently breathes well through his nose. He reports post nasal drip, heartburn, coughing especially when it is cold out, and clearing his throat often.  He denies aural fullness, difficulty falling asleep, facial pressure and pain, and recent use of nasal sprays.  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    Review of Systems   Constitutional: Negative.    HENT:  Positive for hearing loss and tinnitus.    Eyes: Negative.    Respiratory:  Positive for cough.    Gastrointestinal:  Positive for heartburn.   Skin: Negative.    Neurological:  Negative for dizziness.   Endo/Heme/Allergies:  Positive for environmental allergies.       Physical Exam  Vitals and nursing note reviewed.   Constitutional:        Appearance: Normal appearance.   HENT:      Head: Normocephalic and atraumatic.      Jaw: There is normal jaw occlusion.      Right Ear: Hearing, tympanic membrane and ear canal normal.      Left Ear: Hearing, tympanic membrane and ear canal normal.      Nose: No mucosal edema, congestion or rhinorrhea.      Right Nostril: No occlusion.      Left Nostril: No occlusion.      Right Turbinates: Swollen. Not enlarged.      Left Turbinates: Swollen. Not enlarged.      Right Sinus: No maxillary sinus tenderness or frontal sinus tenderness.      Left Sinus: No maxillary sinus tenderness or frontal sinus tenderness.      Mouth/Throat:      Mouth: Mucous membranes are moist.      Pharynx: Oropharynx is clear. Uvula midline.   Eyes:      Extraocular Movements: Extraocular movements intact.      Pupils: Pupils are equal, round, and reactive to light.   Neurological:      Mental Status: He is alert.         AUDIOGRAM: The patient underwent an audiogram today.  Right: Speech reception threshold is 10 dB with 100% word recognition.  Left: Speech reception threshold is 10 dB with 100% word recognition.    A/P  Audiogram/Imaging was independently reviewed and discussed in detail with the patient.   His tinnitus is associated with hearing loss from acoustic trauma. The following treatment options are discussed:  Referral to a tinnitus therapy  Use of hearing aids    The patient is advised to:   Stay well hydrated  Use sounds to distract themselves when the tinnitus makes it hard to sleep  Take Mg and Zn  Not eat at least 2 hours before bed  Slightly elevate the mattress when sleeping    For inflammation in the nose and dry cough, fluticasone (FLONASE) 50 MCG/ACT nasal spray, spray 1 spray into both nostrils daily, is prescribed today.    Follow up in clinic in 1 year with a hearing test.    Scribe/Staff:    Scribe Disclosure:   Emilia OH, am serving as a scribe; to document services personally performed by Maryam  MD Logan based on data collection and the provider's statements to me.     Provider Disclosure:  I agree with above History, Review of Systems, Physical exam and Plan.  I have reviewed the content of the documentation and have edited it as needed. I have personally performed the services documented here and the documentation accurately represents those services and the decisions I have made.      Electronically signed by:  Maryam Ford MD

## 2024-02-20 NOTE — LETTER
2/20/2024         RE: Ruddy Aaron  [address]  [facility] [address]        Dear Colleague,    Thank you for referring your patient, Ruddy Aaron, to the [facility]. Please see a copy of my visit note below.    Chief Complaint   Patient presents with     Consult      PCP: Berry Lazo     Referring Provider: Berry Lazo    /76 (BP Location: Left arm, Patient Position: Sitting, Cuff Size: Adult Large)   Pulse 81   SpO2 96%     ENT Problem List:  Patient Active Problem List   Diagnosis Code     CARDIOVASCULAR SCREENING; LDL GOAL LESS THAN 130 Z13.6     Headaches, tension-type G44.209     Chronic eczema L30.9     HTN, goal below 130/80 I10     Seasonal allergic rhinitis J30.2     Overweight (BMI 25.0-29.9) E66.3     Neck pain M54.2     Nonallergic rhinitis J31.0     Combined forms of age-related cataract, mild, of both eyes H25.813     Seborrheic dermatitis L21.9     Hyperlipidemia LDL goal <100 E78.5     Upper airway cough syndrome R05.8     Benign prostatic hyperplasia with urinary frequency N40.1, R35.0     Chronic cough R05.3     Tinnitus, bilateral H93.13      Current Medications:  Current Outpatient Medications   Medication     alfuzosin ER (UROXATRAL) 10 MG 24 hr tablet     alfuzosin ER (UROXATRAL) 10 MG 24 hr tablet     ASPIRIN 81 PO     atorvastatin (LIPITOR) 10 MG tablet     glucosamine-chondroitin 500-400 MG CAPS per capsule     hydrocortisone (WESTCORT) 0.2 % external cream     ketoconazole (NIZORAL) 2 % external cream     metoprolol succinate ER (TOPROL XL) 25 MG 24 hr tablet     phentermine (ADIPEX-P) 15 MG capsule     sildenafil (VIAGRA) 25 MG tablet     triamterene-HCTZ (DYAZIDE) 37.5-25 MG capsule     No current facility-administered medications for this visit.     CT SINUS W/O CONTRAST 4/21/2021     History: Sinusitis, chronic or recurrent; sinonasal symptoms, possible surgery - [facility] image-guidance protocol; PND (post-nasal drip);  Chronic cough     Comparison: Normal      Technique:  Using thin collimation multidetector helical acquisition technique, axial, coronal, and sagittal thin section CT images were reconstructed through the paranasal sinuses. Images were reviewed in bone and soft tissue windows.     Findings: Paranasal sinuses are clear     The ostiomeatal units appear patent bilaterally. The bony walls of the paranasal sinuses are intact.     The adenoid tonsils in the nasopharynx are unremarkable.                                                             Impression:  No evidence of sinusitis.    FL Esophagram Double Contrast 4/19/23    Impression:  1-2 cm sliding hiatal hernia. Spontaneous gastroesophageal reflux reached the upper thoracic  esophagus during the exam.    COMPARISON: None    FINDINGS:  The esophagus is normal caliber. A 13 mm barium tablet passed into the stomach.    Normal appearance of the primary esophageal peristaltic wave.    1-2 cm sliding hiatal hernia.    Spontaneous gastroesophageal reflux reached the upper thoracic esophagus during the exam.    XR CHEST 2 VIEWS 4/19/23    Impression:  Mild subsegmental atelectasis or scarring in the right base has increased. Chest  otherwise negative. No significant change since 07/18/2022.    HPI  Pleasant 63 year old male presents today as a(n) new patient for bilateral tinnitus with decreased hearing that onset 1 year ago, and worsened the past 6 months. He has year round allergies and currently breathes well through his nose. He reports post nasal drip, heartburn, coughing especially when it is cold out, and clearing his throat often.  He denies aural fullness, difficulty falling asleep, facial pressure and pain, and recent use of nasal sprays.  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    Review of Systems   Constitutional: Negative.    HENT:  Positive for hearing loss and tinnitus.    Eyes: Negative.     Respiratory:  Positive for cough.    Gastrointestinal:  Positive for heartburn.   Skin: Negative.    Neurological:  Negative for dizziness.   Endo/Heme/Allergies:  Positive for environmental allergies.       Physical Exam  Vitals and nursing note reviewed.   Constitutional:       Appearance: Normal appearance.   HENT:      Head: Normocephalic and atraumatic.      Jaw: There is normal jaw occlusion.      Right Ear: Hearing, tympanic membrane and ear canal normal.      Left Ear: Hearing, tympanic membrane and ear canal normal.      Nose: No mucosal edema, congestion or rhinorrhea.      Right Nostril: No occlusion.      Left Nostril: No occlusion.      Right Turbinates: Swollen. Not enlarged.      Left Turbinates: Swollen. Not enlarged.      Right Sinus: No maxillary sinus tenderness or frontal sinus tenderness.      Left Sinus: No maxillary sinus tenderness or frontal sinus tenderness.      Mouth/Throat:      Mouth: Mucous membranes are moist.      Pharynx: Oropharynx is clear. Uvula midline.   Eyes:      Extraocular Movements: Extraocular movements intact.      Pupils: Pupils are equal, round, and reactive to light.   Neurological:      Mental Status: He is alert.         AUDIOGRAM: The patient underwent an audiogram today.  Right: Speech reception threshold is 10 dB with 100% word recognition.  Left: Speech reception threshold is 10 dB with 100% word recognition.    A/P  Audiogram/Imaging was independently reviewed and discussed in detail with the patient.   His tinnitus is associated with hearing loss from acoustic trauma. The following treatment options are discussed:  Referral to a tinnitus therapy  Use of hearing aids    The patient is advised to:   Stay well hydrated  Use sounds to distract themselves when the tinnitus makes it hard to sleep  Take Mg and Zn  Not eat at least 2 hours before bed  Slightly elevate the mattress when sleeping    For inflammation in the nose and dry cough, fluticasone (FLONASE) 50  MCG/ACT nasal spray, spray 1 spray into both nostrils daily, is prescribed today.    Follow up in clinic in 1 year with a hearing test.    Scribe/Staff:    Scribe Disclosure:   I, Emilia Jim, am serving as a scribe; to document services personally performed by Maryam Ford MD based on data collection and the provider's statements to me.     Provider Disclosure:  I agree with above History, Review of Systems, Physical exam and Plan.  I have reviewed the content of the documentation and have edited it as needed. I have personally performed the services documented here and the documentation accurately represents those services and the decisions I have made.      Electronically signed by:  Maryam Ford MD      Again, thank you for allowing me to participate in the care of your patient.        Sincerely,        Maryam Ford MD

## 2024-03-06 ENCOUNTER — PATIENT OUTREACH (OUTPATIENT)
Dept: CARE COORDINATION | Facility: CLINIC | Age: 64
End: 2024-03-06
Payer: COMMERCIAL

## 2024-03-20 ENCOUNTER — PATIENT OUTREACH (OUTPATIENT)
Dept: CARE COORDINATION | Facility: CLINIC | Age: 64
End: 2024-03-20
Payer: COMMERCIAL

## 2024-03-25 DIAGNOSIS — N40.1 BPH ASSOCIATED WITH NOCTURIA: ICD-10-CM

## 2024-03-25 DIAGNOSIS — I10 HTN, GOAL BELOW 130/80: ICD-10-CM

## 2024-03-25 DIAGNOSIS — R35.1 BPH ASSOCIATED WITH NOCTURIA: ICD-10-CM

## 2024-03-25 RX ORDER — METOPROLOL SUCCINATE 25 MG/1
25 TABLET, EXTENDED RELEASE ORAL DAILY
Qty: 90 TABLET | Refills: 0 | Status: SHIPPED | OUTPATIENT
Start: 2024-03-25 | End: 2024-08-13

## 2024-03-29 RX ORDER — ALFUZOSIN HYDROCHLORIDE 10 MG/1
10 TABLET, EXTENDED RELEASE ORAL DAILY
Qty: 90 TABLET | Refills: 3 | OUTPATIENT
Start: 2024-03-29 | End: 2025-03-29

## 2024-04-17 DIAGNOSIS — J34.3 NASAL TURBINATE HYPERTROPHY: ICD-10-CM

## 2024-04-17 DIAGNOSIS — J30.1 ALLERGIC RHINITIS DUE TO POLLEN, UNSPECIFIED SEASONALITY: ICD-10-CM

## 2024-04-17 RX ORDER — FLUTICASONE PROPIONATE 50 MCG
1 SPRAY, SUSPENSION (ML) NASAL DAILY
Qty: 48 G | Refills: 2 | Status: SHIPPED | OUTPATIENT
Start: 2024-04-17

## 2024-05-20 DIAGNOSIS — I10 HTN, GOAL BELOW 130/80: ICD-10-CM

## 2024-05-20 DIAGNOSIS — R35.1 BPH ASSOCIATED WITH NOCTURIA: Primary | ICD-10-CM

## 2024-05-20 DIAGNOSIS — N40.1 BPH ASSOCIATED WITH NOCTURIA: Primary | ICD-10-CM

## 2024-05-20 RX ORDER — TRIAMTERENE AND HYDROCHLOROTHIAZIDE 37.5; 25 MG/1; MG/1
1 CAPSULE ORAL EVERY MORNING
Qty: 90 CAPSULE | Refills: 1 | Status: SHIPPED | OUTPATIENT
Start: 2024-05-20

## 2024-05-28 RX ORDER — ALFUZOSIN HYDROCHLORIDE 10 MG/1
10 TABLET, EXTENDED RELEASE ORAL DAILY
Qty: 90 TABLET | Refills: 0 | Status: SHIPPED | OUTPATIENT
Start: 2024-05-28 | End: 2025-05-28

## 2024-06-01 ENCOUNTER — HEALTH MAINTENANCE LETTER (OUTPATIENT)
Age: 64
End: 2024-06-01

## 2024-06-18 ENCOUNTER — OFFICE VISIT (OUTPATIENT)
Dept: UROLOGY | Facility: CLINIC | Age: 64
End: 2024-06-18
Payer: COMMERCIAL

## 2024-06-18 DIAGNOSIS — R39.15 URINARY URGENCY: ICD-10-CM

## 2024-06-18 DIAGNOSIS — R35.1 BPH ASSOCIATED WITH NOCTURIA: Primary | ICD-10-CM

## 2024-06-18 DIAGNOSIS — N40.1 BPH ASSOCIATED WITH NOCTURIA: Primary | ICD-10-CM

## 2024-06-18 DIAGNOSIS — N32.81 OAB (OVERACTIVE BLADDER): ICD-10-CM

## 2024-06-18 PROCEDURE — 51798 US URINE CAPACITY MEASURE: CPT | Performed by: UROLOGY

## 2024-06-18 PROCEDURE — 99214 OFFICE O/P EST MOD 30 MIN: CPT | Mod: 25 | Performed by: UROLOGY

## 2024-06-18 RX ORDER — ALFUZOSIN HYDROCHLORIDE 10 MG/1
1 TABLET, EXTENDED RELEASE ORAL DAILY
Qty: 90 TABLET | Refills: 3 | Status: SHIPPED | OUTPATIENT
Start: 2024-06-18

## 2024-06-18 RX ORDER — TOLTERODINE 4 MG/1
4 CAPSULE, EXTENDED RELEASE ORAL DAILY
Qty: 90 CAPSULE | Refills: 3 | Status: SHIPPED | OUTPATIENT
Start: 2024-06-18 | End: 2025-06-18

## 2024-06-18 NOTE — NURSING NOTE
Ruddy Aaron's goals for this visit include:   Chief Complaint   Patient presents with    RECHECK     BPH       He requests these members of his care team be copied on today's visit information:       PCP: Berry Lazo    Referring Provider:  Berry Lazo MD  [address] [place] [address]    post void residual: 3 ml    Do you need any medication refills at today's visit?     Barbie Wilson LPN on 6/18/2024 at 9:10 AM

## 2024-06-18 NOTE — PROGRESS NOTES
"[facility]   CHIEF COMPLAINT   It was my pleasure to see Ruddy Aaron who is a 63 year old male for follow-up of BPH with nocturia.      HPI   Ruddy Aaron is a very pleasant 63 year old male    Initially seen 11/30/21:  Ruddy Aaron is a 61 year old male who is being seen for evaluation of LUTS     Having strong urges to urinate every 1-2 hours during the day  Overnight, needing to void every 3-4 hours  Strong urges, at times will need to pee outside before going into his house  Drinks 2 cups of tea - one in the morning and one at night  Otherwise he drinks water     He was started on tamsulosin 0.4mg (Flomax) and he developed some abdominal pain  This was worse with movement, but also \"bladder tenderness\"  He stopped the tamsulosin 0.4mg (Flomax) and the pain resolved, still very mild tenderness      AUASS: 2-0-9-5-0-0-5 = 15  QOL = 3  PVR = 37     He works second shift for [employer]      No family history of prostate issues    2/3/22:  He was started on Alfuzosin at last visit  He has been doing well on this with no abdominal pain or issues    AUASS: 0-5-8-5-1-0-3 = 12  QOL = 1  PVR = 34cc    6/29/2023:  Nocturia really flucuates  Some nights this will be great with 0 or 1 time waking, other times he will be about 4 times  This does not seem to correlate with fluid intake that he is aware of  He does have fluctuating stress from work and he notes the summer season is quite a busy season    AUASS: 0-3-3-0-3-0-4 = 13  QOL = 1/2  PVR = 13cc    TODAY 6/18/2024:  Drinks tea in the morning and at night  Water in between   He notes that he will get a very strong urge to urinate after getting to work has he is working to get through security    AUASS: 9-5-9-4-2-2-4 = 18  QOL = 2  PVR = 3cc    PHYSICAL EXAM  Patient is a 63 year old  male   Vitals: There were no vitals taken for this visit.  There is no height or weight on file to calculate BMI.  General Appearance Adult:   Alert, no acute distress, " oriented  HENT: throat/mouth:normal, good dentition  Lungs: no respiratory distress, or pursed lip breathing  Heart: No obvious jugular venous distension present  Musculoskeltal: extremities normal, no peripheral edema  Neuro: Alert, oriented, speech and mentation normal  Psych: affect and mood normal  Gait: Normal     PSA   Date Value Ref Range Status   06/23/2015 1.10 0 - 4 ug/L Final     Prostate Specific Antigen Screen   Date Value Ref Range Status   04/05/2023 1.58 0.00 - 4.00 ug/L Final      Creatinine   Date Value Ref Range Status   04/05/2023 1.03 0.66 - 1.25 mg/dL Final   02/10/2021 1.15 0.66 - 1.25 mg/dL Final        IMAGING:  All pertinent imaging reviewed:     All imaging studies reviewed by me.  I personally reviewed these imaging films.  A formal report from radiology will follow.     CT ABD/PEL 10/18/21:  Findings:      Abdomen and pelvis: Hepatic parenchyma is diffusely hypoattenuating  with focal fatty sparing along the gallbladder fossa. Multiple tiny  hypoattenuating lesions, too small to characterize. Gallbladder is  partially dilated with mild wall thickening of the gallbladder fundus.  No pericholecystic fluid. No cholelithiasis. No intra- or extrahepatic  biliary dilation. Spleen, pancreas, and adrenal glands are  unremarkable. Kidneys demonstrate symmetric enhancement without solid  lesions, hydronephrosis, or nephrolithiasis. Ureters and urinary  bladder are unremarkable. Prostatomegaly.     Small hiatal hernia. Stomach and small intestine are otherwise  unremarkable. Colonic diverticulosis without evidence of acute  diverticulitis. Appendix is visualized and normal in caliber.      No suspicious abdominal or pelvic lymphadenopathy. No free fluid. No  pneumoperitoneum.     Abdominal aorta is normal in caliber and patent. Celiac and mesenteric  artery origins are unremarkable. Portal veins and IVC are patent.     Lower thorax: Unremarkable.     Bones and soft tissues: No acute or suspicious  osseous abnormality.  Soft tissue is unremarkable.                                                                      Impression:   1. Hepatic steatosis  2. Persistent focal gallbladder wall thickening as seen on ultrasound  10/13/2021, nonspecific, possibly focal asymmetric adenomyomatosis in  an asymptomatic patient. Recommend 6 month follow-up abdominal  ultrasound as previously recommended.            ASSESSMENT and PLAN  63-year-old man with symptoms of BPH and LUTS    BPH and LUTS  -We again reviewed the pathophysiology of the bladder and the prostate and the normal changes associated with the development of BPH and LUTS  - He has been doing fairly well from an outlet symptom standpoint with alfuzosin 10 mg daily and refill prescription sent to the pharmacy  - We discussed the changes to the bladder leading to OAB symptoms as well as the impacts of caffeine and diuretic and his tea.  We discussed cutting back on his tea intake as well as initiation of an OAB medication  - Prescription for Detrol 4 mg XL daily sent to the pharmacy  - Follow-up in 1 year for symptom check  - This is a chronic problem with progression of symptoms  - I again reviewed his prior imaging and reviewed these images personally.  He does have evidence of BPH with a small median lobe  - I reviewed his most recent PSA from last year which is stable and nonconcerning      Time spent: 18 minutes spent on the date of the encounter doing chart review, history and exam, documentation and further activities as noted above.    Note copy attestation: the elements have been reviewed, edited as needed, and remain pertinent to today's visit.     Alex White MD   Urology  [facility]  [facility] Phone: [phone]  [facility] Phone: [phone]

## 2024-08-06 ASSESSMENT — SLEEP AND FATIGUE QUESTIONNAIRES
HOW LIKELY ARE YOU TO NOD OFF OR FALL ASLEEP WHEN YOU ARE A PASSENGER IN A CAR FOR AN HOUR WITHOUT A BREAK: SLIGHT CHANCE OF DOZING
HOW LIKELY ARE YOU TO NOD OFF OR FALL ASLEEP WHILE SITTING AND TALKING TO SOMEONE: WOULD NEVER DOZE
HOW LIKELY ARE YOU TO NOD OFF OR FALL ASLEEP IN A CAR, WHILE STOPPED FOR A FEW MINUTES IN TRAFFIC: WOULD NEVER DOZE
HOW LIKELY ARE YOU TO NOD OFF OR FALL ASLEEP WHILE SITTING QUIETLY AFTER LUNCH WITHOUT ALCOHOL: WOULD NEVER DOZE
HOW LIKELY ARE YOU TO NOD OFF OR FALL ASLEEP WHILE SITTING INACTIVE IN A PUBLIC PLACE: SLIGHT CHANCE OF DOZING
HOW LIKELY ARE YOU TO NOD OFF OR FALL ASLEEP WHILE LYING DOWN TO REST IN THE AFTERNOON WHEN CIRCUMSTANCES PERMIT: MODERATE CHANCE OF DOZING
HOW LIKELY ARE YOU TO NOD OFF OR FALL ASLEEP WHILE WATCHING TV: SLIGHT CHANCE OF DOZING
HOW LIKELY ARE YOU TO NOD OFF OR FALL ASLEEP WHILE SITTING AND READING: SLIGHT CHANCE OF DOZING

## 2024-08-07 ENCOUNTER — OFFICE VISIT (OUTPATIENT)
Dept: SLEEP MEDICINE | Facility: CLINIC | Age: 64
End: 2024-08-07
Payer: COMMERCIAL

## 2024-08-07 VITALS
BODY MASS INDEX: 27.52 KG/M2 | WEIGHT: 196.6 LBS | SYSTOLIC BLOOD PRESSURE: 118 MMHG | HEART RATE: 73 BPM | DIASTOLIC BLOOD PRESSURE: 72 MMHG | HEIGHT: 71 IN | OXYGEN SATURATION: 98 %

## 2024-08-07 DIAGNOSIS — R29.818 SUSPECTED SLEEP APNEA: Primary | ICD-10-CM

## 2024-08-07 DIAGNOSIS — R05.3 CHRONIC COUGH: ICD-10-CM

## 2024-08-07 DIAGNOSIS — E66.3 OVERWEIGHT (BMI 25.0-29.9): ICD-10-CM

## 2024-08-07 DIAGNOSIS — E78.5 HYPERLIPIDEMIA LDL GOAL <100: ICD-10-CM

## 2024-08-07 DIAGNOSIS — I10 HTN, GOAL BELOW 130/80: ICD-10-CM

## 2024-08-07 PROCEDURE — 99205 OFFICE O/P NEW HI 60 MIN: CPT | Performed by: NURSE PRACTITIONER

## 2024-08-07 RX ORDER — DULOXETIN HYDROCHLORIDE 20 MG/1
20 CAPSULE, DELAYED RELEASE ORAL DAILY
COMMUNITY
Start: 2024-07-22

## 2024-08-07 NOTE — PROGRESS NOTES
Outpatient Sleep Medicine Consultation:      Name: Ruddy Aaron MRN# [id number]   Age: 63 year old YOB: 1960     Date of Consultation: August 7, 2024  Consultation is requested by: No referring provider defined for this encounter. No ref. provider found  Primary care provider: Berry Lazo       Reason for Sleep Consult:     Ruddy Aaron is sent by No ref. provider .    Patient s Reason for visit  Ruddy Aaron main reason for visit: Doctor's recommendation  Patient states problem(s) started: approx a year  Ruddy Aaron's goals for this visit: sleeping improvement           Assessment and Plan:     Impression/Plan:    ICD-10-CM    1. Suspected sleep apnea  R29.818 HST - Home Sleep Apnea Test - Noxturnal, T-3 Returnable      2. HTN, goal below 130/80  I10 HST - Home Sleep Apnea Test - Noxturnal, T-3 Returnable      3. Overweight (BMI 25.0-29.9)  E66.3 HST - Home Sleep Apnea Test - Noxturnal, T-3 Returnable      4. Chronic cough  R05.3 HST - Home Sleep Apnea Test - Noxturnal, T-3 Returnable      5. Hyperlipidemia LDL goal <100  E78.5 HST - Home Sleep Apnea Test - Noxturnal, T-3 Returnable        Plans for Ruddy Aaron includes a home sleep test.  After testing, patient is asked to send a Retrophin message that he has done so and then return he will be notified of his results as they become available.  Patient has a STOP-BANG score of 6/8 with positives of snoring, fatigue, blood pressure, age, neck circumference, and gender.  This places him at high likelihood for obstructive sleep apnea.  Ruddy Aaron has comorbidities f of hypertension, overweight, hyperlipidemia, chronic cough.  Recommend that patient optimize his sleep schedule as well as his sleep hygiene practices to mitigate any further sleep disruption.  Recommend that patient employ safe driving practices such as not driving motor vehicle should he become drowsy.  Recommend patient maintain his BMI below 30.0    60 minutes spent with  patient, all of which were spent face-to-face counseling, consulting, coordinating plan of care.      KRISTIN Ratliff CNP         History of Present Illness:     Ruddy Aaron presents to the sleep medicine clinic with concerns of improving his sleeping.      Unrefreshed sleep, daytime fatigue    Patient and his wife sleep separately due to patient's excessive snoring, she has witnessed apnea spells as well.    Patient has exertional shortness of breath with his accustomed activities.    Has neurogenic chronic cough, treated with duloxetine    Easily fatigued, felt to be deconditioned    Hypertension: Was recommended to treat sleep apnea as well as advised to lose weight.    Recently went to [facility] for an executive exam, underwent pulmonary function tests (determined to be normal), as well as an overnight oximetry.  Overnight oximetry suggested nocturnal disordered breathing.  SaO2 carol to 83%.    Underwent stress EKG which was negative for ischemia with VO2 max suggested that he is deconditioned without any obvious cardiac etiology for the above. This is likely multifactorial from his deconditioning, obesity, possible pulmonary etiology but his PFTs showed normal spirometry and diffusing capacity. However his CT scan of the chest suggest small airway disease     Per Goel note 7/12/2024:  Polycythemia with the erythropoietin level of 11  Suggestive of secondary causes. We will get sleep studies done given his abnormal nocturnal oximetry studies. His pretest probability for obstructive sleep apnea is very high. Again weight loss will be the key.     Undergoing testing for erythrocytosis.  Felt unlikely to be polycythemia vera, felt likely due to sleep apnea.    Tonsils: In    Neck Circumference: Greater than 40 cm    Olar Sleepiness Scale  Total score - Olar:6   (Less than 10 normal)     Insomnia Severity Scale  KENYETTA Total Score: 13  (normal 0-7, mild 8-14, moderate 15-21, severe  22-28)    STOP-BANG score 6/8 which places Dev at a high likelihood for obstructive sleep apnea.  We discussed basic pathophysiology of central sleep apnea as well as that of obstructive sleep apnea.  In addition, discussed implications of untreated sleep apnea with respect to his medical diagnoses.  Also reviewed surgical and nonsurgical treatments of sleep apnea.    Social History:  He works second shift for [employer]     Past Sleep Evaluations:    None  SLEEP-WAKE SCHEDULE:     Work/School Days: Patient goes to school/work: No   Usually gets into bed at 1AM  Takes patient about 20 - 30 mins to fall asleep  Has trouble falling asleep 0 nights per week  Wakes up in the middle of the night 3-5 times.  Wakes up due to Use the bathroom  He has trouble falling back asleep 0 times a week.   It usually takes 15-20 mins to get back to sleep  Patient is usually up at 8am  Uses alarm: Yes    Weekends/Non-work Days/All Other Days:  Usually gets into bed at 1am   Takes patient about 15-20 mins to fall asleep  Patient is usually up at 8am  Uses alarm: Yes    Sleep Need  Patient gets  6-7 hrs sleep on average   Patient thinks he needs about 8+ hrs sleep    Dev JUAN Aaron prefers to sleep in this position(s): Side;Stomach   Patient states they do the following activities in bed: Use phone, computer, or tablet    Naps  Patient takes a purposeful nap 0 times a week and naps are usually 0 in duration  He feels better after a nap:    He dozes off unintentionally 0 days per week  Patient has had a driving accident or near-miss due to sleepiness/drowsiness: No      SLEEP DISRUPTIONS:    Breathing/Snoring  Patient snores:Yes  Other people complain about his snoring: Yes  Patient has been told he stops breathing in his sleep:No  He has issues with the following: Getting up to urinate more than once.    Movement:  Patient gets pain, discomfort, with an urge to move:  No restless legs symptoms  It happens when he is resting:  No  It  happens more at night:     Patient has been told he kicks his legs at night:  No     Behaviors in Sleep:  Ruddy Aaron has experienced the following behaviors while sleeping:    He has experienced sudden muscle weakness during the day: No  Pt denies bruxism, sleep talking, sleep walking, and dream enactment behavior. Pt denies sleep paralysis, hypnagogue and cataplexy.       Is there anything else you would like your sleep provider to know:        CAFFEINE AND OTHER SUBSTANCES:    Patient consumes caffeinated beverages per day:  1 cup tea  Last caffeine use is usually: 9am  List of any prescribed or over the counter stimulants that patient takes:    List of any prescribed or over the counter sleep medication patient takes:    List of previous sleep medications that patient has tried:    Patient drinks alcohol to help them sleep: No  Patient drinks alcohol near bedtime: No    Family History:  Patient has a family member been diagnosed with a sleep disorder: No            SCALES:    EPWORTH SLEEPINESS SCALE         8/6/2024    11:22 AM    Boone Sleepiness Scale ( MARIA TERESA Perez  [id number]<br>ESS - [place]/English - Final version - 21 Nov 07 - [facility] [employer].)   Sitting and reading Slight chance of dozing   Watching TV Slight chance of dozing   Sitting, inactive in a public place (e.g. a theatre or a meeting) Slight chance of dozing   As a passenger in a car for an hour without a break Slight chance of dozing   Lying down to rest in the afternoon when circumstances permit Moderate chance of dozing   Sitting and talking to someone Would never doze   Sitting quietly after a lunch without alcohol Would never doze   In a car, while stopped for a few minutes in traffic Would never doze   Boone Score (MC) 6   Boone Score (Sleep) 6         INSOMNIA SEVERITY INDEX (KENYETTA)          8/6/2024    11:10 AM   Insomnia Severity Index (KENYETTA)   Difficulty falling asleep 1   Difficulty staying asleep 2   Problems waking up too  "early 2   How SATISFIED/DISSATISFIED are you with your CURRENT sleep pattern? 2   How NOTICEABLE to others do you think your sleep problem is in terms of impairing the quality of your life? 2   How WORRIED/DISTRESSED are you about your current sleep problem? 2   To what extent do you consider your sleep problem to INTERFERE with your daily functioning (e.g. daytime fatigue, mood, ability to function at work/daily chores, concentration, memory, mood, etc.) CURRENTLY? 2   KENYETTA Total Score 13       Guidelines for Scoring/Interpretation:  Total score categories:  0-7 = No clinically significant insomnia   8-14 = Subthreshold insomnia   15-21 = Clinical insomnia (moderate severity)  22-28 = Clinical insomnia (severe)  Used via courtesy of www.[employer] [place].va.gov with permission from Wellington Linda PhD., [facility]      STOP BANG           8/6/2024    11:23 AM   STOP BANG Questionnaire (  2008, the American Society of Anesthesiologists, Inc. [employer].)   1. Snoring - Do you snore loudly (louder than talking or loud enough to be heard through closed doors)? No   2. Tired - Do you often feel tired, fatigued, or sleepy during daytime? Yes   3. Observed - Has anyone observed you stop breathing during your sleep? No   4. Blood pressure - Do you have or are you being treated for high blood pressure? No   5. BMI - BMI more than 35 kg/m2? No   6. Age - Age over 50 yr old? Yes   7. Neck circumference - Neck circumference greater than 40 cm? No   8. Gender - Gender male? Yes   STOP BANG Score (MC): 2 (Low risk of CHAO)         GAD7         No data to display                  CAGE-AID         No data to display                CAGE-AID reprinted with permission from the [place] Medical Journal, MEY Cruz. and OREN Daley, \"Conjoint screening questionnaires for alcohol and drug abuse\" [place] Medical Journal 94: 135-140, 1995.      PATIENT HEALTH QUESTIONNAIRE-9 (PHQ - 9)         No data to display    "             Developed by Nydia Esteves, Ne Troncoso, Edu Tam and colleagues, with an educational noé from [employer] No permission required to reproduce, translate, display or distribute.        Allergies:    Allergies   Allergen Reactions    Lisinopril Cough       Medications:    Current Outpatient Medications   Medication Sig Dispense Refill    alfuzosin ER (UROXATRAL) 10 MG 24 hr tablet Take 1 tablet (10 mg) by mouth daily 90 tablet 3    alfuzosin ER (UROXATRAL) 10 MG 24 hr tablet Take 1 tablet (10 mg) by mouth daily 90 tablet 0    ASPIRIN 81 PO Take 1 tablet by mouth daily      atorvastatin (LIPITOR) 10 MG tablet Take 1 tablet (10 mg) by mouth daily 90 tablet 3    fluticasone (FLONASE) 50 MCG/ACT nasal spray Spray 1 spray into both nostrils daily 48 g 2    glucosamine-chondroitin 500-400 MG CAPS per capsule Take 1 capsule by mouth daily      hydrocortisone (WESTCORT) 0.2 % external cream APPLY TOPICALLY 2 TIMES DAILY APPLY TO AFFECTED AREAS. 15 g 1    ketoconazole (NIZORAL) 2 % external cream Apply topically daily 30 g 11    metoprolol succinate ER (TOPROL XL) 25 MG 24 hr tablet TAKE 1 TABLET BY MOUTH EVERY DAY 90 tablet 0    phentermine (ADIPEX-P) 15 MG capsule Take 1 capsule (15 mg) by mouth every morning 30 capsule 5    sildenafil (VIAGRA) 25 MG tablet Take 1 tablet (25 mg) by mouth daily as needed (erectile dysfunction) 30 tablet 11    tolterodine ER (DETROL LA) 4 MG 24 hr capsule Take 1 capsule (4 mg) by mouth daily 90 capsule 3    triamterene-HCTZ (DYAZIDE) 37.5-25 MG capsule TAKE 1 CAPSULE BY MOUTH EVERY DAY IN THE MORNING 90 capsule 1       Problem List:  Patient Active Problem List    Diagnosis Date Noted    Tinnitus, bilateral 11/19/2023     Priority: Medium    Benign prostatic hyperplasia with urinary frequency 10/09/2021     Priority: Medium    Chronic cough 10/09/2021     Priority: Medium    Seborrheic dermatitis 02/10/2021     Priority: Medium    Hyperlipidemia LDL goal  <100 02/10/2021     Priority: Medium    Upper airway cough syndrome 02/10/2021     Priority: Medium    Combined forms of age-related cataract, mild, of both eyes 06/16/2018     Priority: Medium    Nonallergic rhinitis 03/04/2018     Priority: Medium    Neck pain 06/02/2017     Priority: Medium    Overweight (BMI 25.0-29.9) 07/11/2016     Priority: Medium    HTN, goal below 130/80 09/27/2011     Priority: Medium    Seasonal allergic rhinitis 09/27/2011     Priority: Medium    CARDIOVASCULAR SCREENING; LDL GOAL LESS THAN 130 08/08/2011     Priority: Medium    Chronic eczema 08/08/2011     Priority: Medium    Headaches, tension-type      Priority: Medium     Problem list name updated by automated process. Provider to review and confirm          Past Medical/Surgical History:  Past Medical History:   Diagnosis Date    Combined forms of age-related cataract, mild, of both eyes 06/16/2018    Gastroesophageal reflux disease     Hyperlipidemia     Hypertension goal BP (blood pressure) < 140/90 09/27/2011    Seasonal allergic rhinitis 09/27/2011    Tension type headache, unspecified      Past Surgical History:   Procedure Laterality Date    VASECTOMY         Social History:  Social History     Socioeconomic History    Marital status:      Spouse name: Not on file    Number of children: 3    Years of education: Not on file    Highest education level: Not on file   Occupational History     Employer: [employer]   Tobacco Use    Smoking status: Never    Smokeless tobacco: Never   Vaping Use    Vaping status: Never Used   Substance and Sexual Activity    Alcohol use: Not Currently     Alcohol/week: 1.0 standard drink of alcohol     Types: 1 Standard drinks or equivalent per week     Comment: ocassional    Drug use: No    Sexual activity: Yes     Partners: Female   Other Topics Concern    Parent/sibling w/ CABG, MI or angioplasty before 65F 55M? No   Social History Narrative    Not on file     Social  Determinants of Health     Financial Resource Strain: Unknown (10/30/2023)    Financial Resource Strain     Within the past 12 months, have you or your family members you live with been unable to get utilities (heat, electricity) when it was really needed?: Patient refused   Food Insecurity: Patient Declined (7/4/2024)    Received from [facility]    Hunger Vital Sign     Worried About Running Out of Food in the Last Year: Patient declined     Ran Out of Food in the Last Year: Patient declined   Transportation Needs: Patient Declined (7/4/2024)    Received from [facility]    PRAPARE - Transportation     Lack of Transportation (Medical): Patient declined     Lack of Transportation (Non-Medical): Patient declined   Physical Activity: Sufficiently Active (7/2/2024)    Received from [facility]    Exercise Vital Sign     Days of Exercise per Week: 5 days     Minutes of Exercise per Session: 30 min   Stress: No Stress Concern Present (4/14/2023)    Received from [facility], [facility]    [place] [employer] - Occupational Stress Questionnaire     Feeling of Stress : Not at all   Social Connections: Moderately Integrated (4/14/2023)    Received from [facility], [facility]    Social Connection and Isolation Panel [NHANES]     Frequency of Communication with Friends and Family: Three times a week     Frequency of Social Gatherings with Friends and Family: Once a week     Attends Caodaism Services: 1 to 4 times per year     Active Member of Clubs or Organizations: No     Attends Club or Organization Meetings: Never     Marital Status:    Interpersonal Safety: Unknown (10/31/2023)    Interpersonal Safety     Do you feel physically and emotionally safe where you currently live?: Patient refused     Within the past 12 months, have you been hit, slapped, kicked or otherwise physically hurt by someone?: Patient refused     Within the past 12 months, have you been humiliated or emotionally abused  "in other ways by your partner or ex-partner?: Patient refused   Housing Stability: Patient Declined (7/2/2024)    Received from [facility]    Housing Stability     What is your living situation today?: Patient declined       Family History:  Family History   Problem Relation Age of Onset    Retinal detachment Mother     Eye Disorder Mother     Hypertension Father     Eye Disorder Other         mother's side of family has cataracts, and glaucoma?    Asthma No family hx of     C.A.D. No family hx of     Diabetes No family hx of     Cerebrovascular Disease No family hx of     Breast Cancer No family hx of     Cancer - colorectal No family hx of     Prostate Cancer No family hx of     Glaucoma No family hx of     Macular Degeneration No family hx of        Review of Systems:  A complete review of systems reviewed by me is negative with the exeption of what has been mentioned in the history of present illness.  In the last TWO WEEKS have you experienced any of the following symptoms?  Fevers: No  Night Sweats: No  Weight Gain: No  Pain at Night: No  Double Vision: No  Changes in Vision: No  Difficulty Breathing through Nose: No  Sore Throat in Morning: No  Dry Mouth in the Morning: No  Shortness of Breath Lying Flat: No  Shortness of Breath With Activity: No  Awakening with Shortness of Breath: No  Increased Cough: No  Heart Racing at Night: No  Swelling in Feet or Legs: No  Diarrhea at Night: No  Heartburn at Night: No  Urinating More than Once at Night: Yes  Losing Control of Urine at Night: No  Joint Pains at Night: No  Headaches in Morning: No  Weakness in Arms or Legs: No  Depressed Mood: No  Anxiety: No     Physical Examination:  Vitals: /72   Pulse 73   Ht 1.803 m (5' 10.98\")   Wt 89.2 kg (196 lb 9.6 oz)   SpO2 98%   BMI 27.43 kg/m    BMI= Body mass index is 27.43 kg/m .         Physical Exam   Constitutional: He appears healthy. No distress.   HENT:   Nose: No nasal discharge.   Mouth/Throat: " "Dentition is normal. Oropharynx is clear.   Eyes: Conjunctivae are normal.   Pulmonary/Chest: Effort normal and breath sounds normal. He has no wheezes. He has no rales. He exhibits no tenderness.   Musculoskeletal:         General: Normal range of motion.      Cervical back: Normal range of motion and neck supple.   Neurological: He is alert and oriented to person, place, and time.   Skin: Skin is warm and dry.          Mallampati Class: II.  Tonsillar Stage: 2  visible at pillars.    All Labs Personally Reviewed                     Data: All pertinent previous laboratory data reviewed     Recent Labs   Lab Test 04/05/23  0802 02/10/22  0901    139   POTASSIUM 3.9 4.0   CHLORIDE 111* 107   CO2 27 27   ANIONGAP 2* 5   GLC 99 89   BUN 22 21   CR 1.03 1.08   CAROLE 9.1 9.6       Recent Labs   Lab Test 08/12/22  0911   WBC 8.5   RBC 5.99*   HGB 17.4   HCT 51.5   MCV 86   MCH 29.0   MCHC 33.8   RDW 14.1          Recent Labs   Lab Test 02/10/22  0901 08/26/20  1052 03/01/18  0809   PROTTOTAL  --   --  6.5*   ALBUMIN  --   --  3.6   BILITOTAL  --   --  0.6   ALKPHOS  --   --  73   AST  --   --  19   ALT 46   < > 36    < > = values in this interval not displayed.       TSH (mU/L)   Date Value   05/24/2012 2.46       No results found for: \"UAMP\", \"UBARB\", \"BENZODIAZEUR\", \"UCANN\", \"UCOC\", \"OPIT\", \"UPCP\"    No results found for: \"IRONSAT\", \"[id number]\", \"CUBA\"    No results found for: \"PH\", \"PHARTERIAL\", \"PO2\", \"FZ4HFXUIKEH\", \"SAT\", \"PCO2\", \"HCO3\", \"BASEEXCESS\", \"SOLANGE\", \"BEB\"    @LABRCNTIPR(phv:4,pco2v:4,po2v:4,hco3v:4,zayra:4,o2per:4)@    Echocardiology:         Chest x-ray: No results found for this or any previous visit from the past 365 days.      Chest CT: No results found for this or any previous visit from the past 365 days.          PFT: Most Recent [place] Pulmonary Function Testing        KRISTIN Ratliff CNP 8/7/2024   Sleep Medicine            "

## 2024-08-07 NOTE — PATIENT INSTRUCTIONS
"          MY TREATMENT INFORMATION FOR SLEEP APNEA-  Ruddy Aaron    DOCTOR : KRISTIN Ratliff CNP    Am I having a sleep study at a sleep center?  --->Due to normal delays, you will be contacted within 2-4 weeks to schedule    Am I having a home sleep study?  --->Watch the video for the device you are using:    -/drop off device-   https://www.[employer].com/watch?v=yGGFBdELGhk    -Disposable device sent out require phone/computer application-   https://www.[employer].com/watch?v=BCce_vbiwxE      Frequently asked questions:  1. What is Obstructive Sleep Apnea (CHAO)? CHAO is the most common type of sleep apnea. Apnea means, \"without breath.\"  Apnea is most often caused by narrowing or collapse of the upper airway as muscles relax during sleep.   Almost everyone has occasional apneas. Most people with sleep apnea have had brief interruptions at night frequently for many years.  The severity of sleep apnea is related to how frequent and severe the events are.   2. What are the consequences of CHAO? Symptoms include: feeling sleepy during the day, snoring loudly, gasping or stopping of breathing, trouble sleeping, and occasionally morning headaches or heartburn at night.  Sleepiness can be serious and even increase the risk of falling asleep while driving. Other health consequences may include development of high blood pressure and other cardiovascular disease in persons who are susceptible. Untreated CHAO  can contribute to heart disease, stroke and diabetes.   3. What are the treatment options? In most situations, sleep apnea is a lifelong disease that must be managed with daily therapy. Medications are not effective for sleep apnea and surgery is generally not considered until other therapies have been tried. Your treatment is your choice . Continuous Positive Airway (CPAP) works right away and is the therapy that is effective in nearly everyone. An oral device to hold your jaw forward is usually the next most " reliable option. Other options include postioning devices (to keep you off your back), weight loss, and surgery including a tongue pacing device. There is more detail about some of these options below.  4. Are my sleep studies covered by insurance? Although we will request verification of coverage, we advise you also check in advance of the study to ensure there is coverage.    Important tips for those choosing CPAP and similar devices  REMEMBER-IF YOU RECEIVE A CALL FROM  [phone]-->IT IS TO SETUP A DEVICE  For new devices, sign up for device RAFAT to monitor your device for your followup visits  We encourage you to utilize the [facility] rafat or website ( [email or web address] ) to monitor your therapy progress and share the data with your healthcare team when you discuss your sleep apnea.                                                    Know your equipment:  CPAP is continuous positive airway pressure that prevents obstructive sleep apnea by keeping the throat from collapsing while you are sleeping. In most cases, the device is  smart  and can slowly self-adjusts if your throat collapses and keeps a record every day of how well you are treated-this information is available to you and your care team.  BPAP is bilevel positive airway pressure that keeps your throat open and also assists each breath with a pressure boost to maintain adequate breathing.  Special kinds of BPAP are used in patients who have inadequate breathing from lung or heart disease. In most cases, the device is  smart  and can slowly self-adjusts to assist breathing. Like CPAP, the device keeps a record of how well you are treated.  Your mask is your connection to the device. You get to choose what feels most comfortable and the staff will help to make sure if fits. Here: are some examples of the different masks that are available: Magnetic mask aids may assist with use but there are safety issues that should be addressed when  considering with magnets* ( see end of discussion).       Key points to remember on your journey with sleep apnea:  Sleep study.  PAP devices often need to be adjusted during a sleep study to show that they are effective and adjusted right.  Good tips to remember: Try wearing just the mask during a quiet time during the day so your body adapts to wearing it. A humidifier is recommended for comfort in most cases to prevent drying of your nose and throat. Allergy medication from your provider may help you if you are having nasal congestion.  Getting settled-in. It takes more than one night for most of us to get used to wearing a mask. Try wearing just the mask during a quiet time during the day so your body adapts to wearing it. A humidifier is recommended for comfort in most cases. Our team will work with you carefully on the first day and will be in contact within 4 days and again at 2 and 4 weeks for advice and remote device adjustments. Your therapy is evaluated by the device each day.   Use it every night. The more you are able to sleep naturally for 7-8 hours, the more likely you will have good sleep and to prevent health risks or symptoms from sleep apnea. Even if you use it 4 hours it helps. Occasionally all of us are unable to use a medical therapy, in sleep apnea, it is not dangerous to miss one night.   Communicate. Call our skilled team on the number provided on the first day if your visit for problems that make it difficult to wear the device. Over 2 out of 3 patients can learn to wear the device long-term with help from our team. Remember to call our team or your sleep providers if you are unable to wear the device as we may have other solutions for those who cannot adapt to mask CPAP therapy. It is recommended that you sleep your sleep provider within the first 3 months and yearly after that if you are not having problems.   Use it for your health. We encourage use of CPAP masks during daytime quiet  periods to allow your face and brain to adapt to the sensation of CPAP so that it will be a more natural sensation to awaken to at night or during naps. This can be very useful during the first few weeks or months of adapting to CPAP though it does not help medically to wear CPAP during wakefulness and  should not be used as a strategy just to meet guidelines.  Take care of your equipment. Make sure you clean your mask and tubing using directions every day and that your filter and mask are replaced as recommended or if they are not working.     *Masks with magnets:  Updated Contraindications  Masks with magnetic components are contraindicated for use by patients where they, or anyone in close physical contact while using the mask, have the following:   Active medical implants that interact with magnets (i.e., pacemakers, implantable cardioverter defibrillators (ICD), neurostimulators, cerebrospinal fluid (CSF) shunts, insulin/infusion pumps)   Metallic implants/objects containing ferromagnetic material (i.e., aneurysm clips/flow disruption devices, embolic coils, stents, valves, electrodes, implants to restore hearing or balance with implanted magnets, ocular implants, metallic splinters in the eye)  Updated Warning  Keep the mask magnets at a safe distance of at least 6 inches (150 mm) away from implants or medical devices that may be adversely affected by magnetic interference. This warning applies to you or anyone in close physical contact with your mask. The magnets are in the frame and lower headgear clips, with a magnetic field strength of up to 400mT. When worn, they connect to secure the mask but may inadvertently detach while asleep.  Implants/medical devices, including those listed within contraindications, may be adversely affected if they change function under external magnetic fields or contain ferromagnetic materials that attract/repel to magnetic fields (some metallic implants, e.g., contact lenses  with metal, dental implants, metallic cranial plates, screws, mateo hole covers, and bone substitute devices). Consult your physician and  of your implant / other medical device for information on the potential adverse effects of magnetic fields.    BESIDES CPAP, WHAT OTHER THERAPIES ARE THERE?    Positioning Device  Positioning devices are generally used when sleep apnea is mild and only occurs on your back.This example shows a pillow that straps around the waist. It may be appropriate for those whose sleep study shows milder sleep apnea that occurs primarily when lying flat on one's back. Preliminary studies have shown benefit but effectiveness at home may need to be verified by a home sleep test. These devices are generally not covered by medical insurance.  Examples of devices that maintain sleeping on the back to prevent snoring and mild sleep apnea.    Belt type body positioner  http://[employer]/    Electronic reminder  http://nightshifttherapy.[employer]/            Oral Appliance  What is oral appliance therapy?  An oral appliance device fits on your teeth at night like a retainer used after having braces. The device is made by a specialized dentist and requires several visits over 1-2 months before a manufactured device is made to fit your teeth and is adjusted to prevent your sleep apnea. Once an oral device is working properly, snoring should be improved. A home sleep test may be recommended at that time if to determine whether the sleep apnea is adequately treated.       Some things to remember:  -Oral devices are often, but not always, covered by your medical insurance. Be sure to check with your insurance provider.   -If you are referred for oral therapy, you will be given a list of specialized dentists to consider or you may choose to visit the Web site of the American Academy of Dental Sleep Medicine  -Oral devices are less likely to work if you have severe sleep apnea or are extremely  overweight.     More detailed information  An oral appliance is a small acrylic device that fits over the upper and lower teeth  (similar to a retainer or a mouth guard). This device slightly moves jaw forward, which moves the base of the tongue forward, opens the airway, improves breathing for effective treat snoring and obstructive sleep apnea in perhaps 7 out of 10 people .  The best working devices are custom-made by a dental device  after a mold is made of the teeth 1, 2, 3.  When is an oral appliance indicated?  Oral appliance therapy is recommended as a first-line treatment for patients with primary snoring, mild sleep apnea, and for patients with moderate sleep apnea who prefer appliance therapy to use of CPAP4, 5. Severity of sleep apnea is determined by sleep testing and is based on the number of respiratory events per hour of sleep.   How successful is oral appliance therapy?  The success rate of oral appliance therapy in patients with mild sleep apnea is 75-80% while in patients with moderate sleep apnea it is 50-70%. The chance of success in patients with severe sleep apnea is 40-50%. The research also shows that oral appliances have a beneficial effect on the cardiovascular health of CHAO patients at the same magnitude as CPAP therapy7.  Oral appliances should be a second-line treatment in cases of severe sleep apnea, but if not completely successful then a combination therapy utilizing CPAP plus oral appliance therapy may be effective. Oral appliances tend to be effective in a broad range of patients although studies show that the patients who have the highest success are females, younger patients, those with milder disease, and less severe obesity. 3, 6.   Finding a dentist that practices dental sleep medicine  Specific training is available through the American Academy of Dental Sleep Medicine for dentists interested in working in the field of sleep. To find a dentist who is educated in  the field of sleep and the use of oral appliances, near you, visit the Web site of the American Academy of Dental Sleep Medicine.    References  1. Juana, et al. Objectively measured vs self-reported compliance during oral appliance therapy for sleep-disordered breathing. Chest 2013; 144(5): 6792-4108.  2. Loki et al. Objective measurement of compliance during oral appliance therapy for sleep-disordered breathing. Thorax 2013; 68(1): 91-96.  3. Carlene et al. Mandibular advancement devices in 620 men and women with CHAO and snoring: tolerability and predictors of treatment success. Chest 2004; 125: 7938-4919.  4. Jatin, et al. Oral appliances for snoring and CHAO: a review. Sleep 2006; 29: 244-262.  5. Kirstel et al. Oral appliance treatment for CHAO: an update. J Clin Sleep Med 2014; 10(2): 215-227.  6. Sarai et al. Predictors of OSAH treatment outcome. J Dent Res 2007; 86: 2296-2872.      Weight Loss:   Your Body mass index is 27.43 kg/m .    Being overweight does not necessarily mean you will have health consequences.  Those who have BMI over 35 or over 27 with existing medical conditions carries greater risk.   Weight loss decreases severity of sleep apnea in most people with obesity. For those with mild obesity who have developed snoring with weight gain, even 15-30 pound weight loss can improve and occasionally milder eliminate sleep apnea.  Structured and life-long dietary and health habits are necessary to lose weight and keep healthier weight levels.     The Comprehensive Weight loss program offers all aspects of weight loss strategies including two Non-Surgical Weight Loss Programs: Medical Weight Management and our 24 Week Healthy Lifestyle Program:    Medical Weight Management: You will meet with a Medical Weight Management Provider, as well as a Registered Dietician. The program may include medication therapy, dietary education, recommended exercise and physical therapy programs,  monthly support group meetings, and possible psychological counseling. Follow up visits with the provider or dietician are scheduled based on your progress and needs.    24 Week Healthy Lifestyle Program: This unique program is designed to give you the support of weekly appointments and activities thru a 24-week period. It may include all of the components of the basic program (above), with the addition of 11 individual Health  Visits, 24-week access to the [employer] website for over 700 online classes, and monthly support group meetings. This program has an out-of-pocket expense of $499 to cover the items that can not be billed to insurance (health coaches and [employer] access), and is non-refundable/non-transferable (you may be able to use a Health Savings Account; ask your HSA provider). There may be an optional meal replacement plan prescribed as well.   Surgical management achieves meaningful long-term weight loss and improvement in health risks in most patients with more severe obesity.      Sleep Apnea Surgery:    Surgery for obstructive sleep apnea is considered generally only when other therapies fail to work. Surgery may be discussed with you if you are having a difficult time tolerating CPAP and or when there is an abnormal structure that requires surgical correction.  Nose and throat surgeries often enlarge the airway to prevent collapse.  Most of these surgeries create pain for 1-2 weeks and up to half of the most common surgeries are not effective throughout life.  You should carefully discuss the benefits and drawbacks to surgery with your sleep provider and surgeon to determine if it is the best solution for you.   More information  Surgery for CHAO is directed at areas that are responsible for narrowing or complete obstruction of the airway during sleep.  There are a wide range of procedures available to enlarge and/or stabilize the airway to prevent blockage of breathing in the three major  areas where it can occur: the palate, tongue, and nasal regions.  Successful surgical treatment depends on the accurate identification of the factors responsible for obstructive sleep apnea in each person.  A personalized approach is required because there is no single treatment that works well for everyone.  Because of anatomic variation, consultation with an examination by a sleep surgeon is a critical first step in determining what surgical options are best for each patient.  In some cases, examination during sedation may be recommended in order to guide the selection of procedures.  Patients will be counseled about risks and benefits as well as the typical recovery course after surgery. Surgery is typically not a cure for a person s CHAO.  However, surgery will often significantly improve one s CHAO severity (termed  success rate ).  Even in the absence of a cure, surgery will decrease the cardiovascular risk associated with OSA7; improve overall quality of life8 (sleepiness, functionality, sleep quality, etc).      Palate Procedures:  Patients with CHAO often have narrowing of their airway in the region of their tonsils and uvula.  The goals of palate procedures are to widen the airway in this region as well as to help the tissues resist collapse.  Modern palate procedure techniques focus on tissue conservation and soft tissue rearrangement, rather than tissue removal.  Often the uvula is preserved in this procedure. Residual sleep apnea is common in patient after pharyngoplasty with an average reduction in sleep apnea events of 33%2.      Tongue Procedures:  ExamWhile patients are awake, the muscles that surround the throat are active and keep this region open for breathing. These muscles relax during sleep, allowing the tongue and other structures to collapse and block breathing.  There are several different tongue procedures available.  Selection of a tongue base procedure depends on characteristics seen on  physical exam.  Generally, procedures are aimed at removing bulky tissues in this area or preventing the back of the tongue from falling back during sleep.  Success rates for tongue surgery range from 50-62%3.    Hypoglossal Nerve Stimulation:  Hypoglossal nerve stimulation has recently received approval from the [place] Food and Drug Administration for the treatment of obstructive sleep apnea.  This is based on research showing that the system was safe and effective in treating sleep apnea6.  Results showed that the median AHI score decreased 68%, from 29.3 to 9.0. This therapy uses an implant system that senses breathing patterns and delivers mild stimulation to airway muscles, which keeps the airway open during sleep.  The system consists of three fully implanted components: a small generator (similar in size to a pacemaker), a breathing sensor, and a stimulation lead.  Using a small handheld remote, a patient turns the therapy on before bed and off upon awakening.    Candidates for this device must be greater than 18 years of age, have moderate to severe obstructive sleep apnea with less than 25% central events  (AHI between 15-65), BMI less than 35, have tried CPAP/oral appliance for at least 8 weeks without success, and have appropriate upper airway anatomy (determined by a sleep endoscopy performed by Dr. Maury Combs or Dr. Michael Rojas).    Nasal Procedures:  Nasal obstruction can interfere with nasal breathing during the day and night.  Studies have shown that relief of nasal obstruction can improve the ability of some patients to tolerate positive airway pressure therapy for obstructive sleep apnea1.  Treatment options include medications such as nasal saline, topical corticosteroid and antihistamine sprays, and oral medications such as antihistamines or decongestants. Non-surgical treatments can include external nasal dilators for selected patients. If these are not successful by themselves,  surgery can improve the nasal airway either alone or in combination with these other options.        Combination Procedures:  Combination of surgical procedures and other treatments may be recommended, particularly if patients have more than one area of narrowing or persistent positional disease.  The success rate of combination surgery ranges from 66-80%2,3.    References  Blanca DAVIS. The Role of the Nose in Snoring and Obstructive Sleep Apnoea: An Update.  Eur Arch Otorhinolaryngol. 2011; 268: 1365-73.   Linda SM; Ashley JA; Gayle JR; Pallanch JF; Mathew MB; Augusto SG; Ayad BOJORQUEZ. Surgical modifications of the upper airway for obstructive sleep apnea in adults: a systematic review and meta-analysis. SLEEP 2010;33(10):4178-1628. Michele BARAKAT. Hypopharyngeal surgery in obstructive sleep apnea: an evidence-based medicine review.  Arch Otolaryngol Head Neck Surg. 2006 Feb;132(2):206-13.  Marcelino YH1, Austyn Y, Erick PATRICIO. The efficacy of anatomically based multilevel surgery for obstructive sleep apnea. Otolaryngol Head Neck Surg. 2003 Oct;129(4):327-35.  Michele BARAKAT, Goldberg A. Hypopharyngeal Surgery in Obstructive Sleep Apnea: An Evidence-Based Medicine Review. Arch Otolaryngol Head Neck Surg. 2006 Feb;132(2):206-13.  Yanni PIPER et al. Upper-Airway Stimulation for Obstructive Sleep Apnea.  N Engl J Med. 2014 Jan 9;370(2):139-49.  Saulo Y et al. Increased Incidence of Cardiovascular Disease in Middle-aged Men with Obstructive Sleep Apnea. Am J Respir Crit Care Med; 2002 166: 159-165  Bell EM et al. Studying Life Effects and Effectiveness of Palatopharyngoplasty (SLEEP) study: Subjective Outcomes of Isolated Uvulopalatopharyngoplasty. Otolaryngol Head Neck Surg. 2011; 144: 623-631.        WHAT IF I ONLY HAVE SNORING?    Mandibular advancement devices, lateral sleep positioning, long-term weight loss and treatment of nasal allergies have been shown to improve snoring.  Exercising tongue muscles with a game  (https://[employer]/us/rafat/soundly-reduce-snoring/rf8311993331) or stimulating the tongue during the day with a device (https://doi.org/10.3390/vfg10067788) have improved snoring in some individuals.  https://www.[employer]/  https://www.sleepfoundation.org/best-anti-snoring-mouthpieces-and-mouthguards    Remember to Drive Safe... Drive Alive     Sleep health profoundly affects your health, mood, and your safety.  Thirty three percent of the population (one in three of us) is not getting enough sleep and many have a sleep disorder. Not getting enough sleep or having an untreated / undertreated sleep condition may make us sleepy without even knowing it. In fact, our driving could be dramatically impaired due to our sleep health. As your provider, here are some things I would like you to know about driving:     Here are some warning signs for impairment and dangerous drowsy driving:              -Having been awake more than 16 hours               -Looking tired               -Eyelid drooping              -Head nodding (it could be too late at this point)              -Driving for more than 30 minutes     Some things you could do to make the driving safer if you are experiencing some drowsiness:              -Stop driving and rest              -Call for transportation              -Make sure your sleep disorder is adequately treated     Some things that have been shown NOT to work when experiencing drowsiness while driving:              -Turning on the radio              -Opening windows              -Eating any  distracting  /  entertaining  foods (e.g., sunflower seeds, candy, or any other)              -Talking on the phone      Your decision may not only impact your life, but also the life of others. Please, remember to drive safe for yourself and all of us.

## 2024-08-13 DIAGNOSIS — L21.9 SEBORRHEIC DERMATITIS: ICD-10-CM

## 2024-08-13 DIAGNOSIS — I10 HTN, GOAL BELOW 130/80: ICD-10-CM

## 2024-08-13 RX ORDER — METOPROLOL SUCCINATE 25 MG/1
25 TABLET, EXTENDED RELEASE ORAL DAILY
Qty: 90 TABLET | Refills: 0 | Status: SHIPPED | OUTPATIENT
Start: 2024-08-13 | End: 2024-09-13

## 2024-08-13 RX ORDER — KETOCONAZOLE 20 MG/G
CREAM TOPICAL
Qty: 30 G | Refills: 11 | Status: SHIPPED | OUTPATIENT
Start: 2024-08-13

## 2024-09-11 ENCOUNTER — PATIENT OUTREACH (OUTPATIENT)
Dept: CARE COORDINATION | Facility: CLINIC | Age: 64
End: 2024-09-11
Payer: COMMERCIAL

## 2024-09-11 DIAGNOSIS — I10 HTN, GOAL BELOW 130/80: ICD-10-CM

## 2024-09-11 DIAGNOSIS — E78.5 HYPERLIPIDEMIA LDL GOAL <100: ICD-10-CM

## 2024-09-13 ENCOUNTER — MYC MEDICAL ADVICE (OUTPATIENT)
Dept: UROLOGY | Facility: CLINIC | Age: 64
End: 2024-09-13
Payer: COMMERCIAL

## 2024-09-13 RX ORDER — ATORVASTATIN CALCIUM 10 MG/1
10 TABLET, FILM COATED ORAL DAILY
Qty: 90 TABLET | Refills: 0 | Status: SHIPPED | OUTPATIENT
Start: 2024-09-13

## 2024-09-13 RX ORDER — METOPROLOL SUCCINATE 25 MG/1
25 TABLET, EXTENDED RELEASE ORAL DAILY
Qty: 90 TABLET | Refills: 0 | Status: SHIPPED | OUTPATIENT
Start: 2024-09-13

## 2024-09-15 ENCOUNTER — MYC MEDICAL ADVICE (OUTPATIENT)
Dept: FAMILY MEDICINE | Facility: CLINIC | Age: 64
End: 2024-09-15
Payer: COMMERCIAL

## 2024-09-15 DIAGNOSIS — I10 HTN, GOAL BELOW 130/80: ICD-10-CM

## 2024-09-16 RX ORDER — METOPROLOL SUCCINATE 25 MG/1
25 TABLET, EXTENDED RELEASE ORAL DAILY
Qty: 90 TABLET | Refills: 0 | OUTPATIENT
Start: 2024-09-16

## 2024-10-02 ENCOUNTER — PATIENT OUTREACH (OUTPATIENT)
Dept: CARE COORDINATION | Facility: CLINIC | Age: 64
End: 2024-10-02
Payer: COMMERCIAL

## 2024-10-22 DIAGNOSIS — I10 HTN, GOAL BELOW 130/80: ICD-10-CM

## 2024-10-22 RX ORDER — TRIAMTERENE AND HYDROCHLOROTHIAZIDE 37.5; 25 MG/1; MG/1
1 CAPSULE ORAL EVERY MORNING
Qty: 90 CAPSULE | Refills: 1 | Status: SHIPPED | OUTPATIENT
Start: 2024-10-22

## 2024-11-09 ASSESSMENT — SLEEP AND FATIGUE QUESTIONNAIRES
HOW LIKELY ARE YOU TO NOD OFF OR FALL ASLEEP IN A CAR, WHILE STOPPED FOR A FEW MINUTES IN TRAFFIC: WOULD NEVER DOZE
HOW LIKELY ARE YOU TO NOD OFF OR FALL ASLEEP WHILE SITTING AND READING: SLIGHT CHANCE OF DOZING
HOW LIKELY ARE YOU TO NOD OFF OR FALL ASLEEP WHEN YOU ARE A PASSENGER IN A CAR FOR AN HOUR WITHOUT A BREAK: SLIGHT CHANCE OF DOZING
HOW LIKELY ARE YOU TO NOD OFF OR FALL ASLEEP WHILE WATCHING TV: SLIGHT CHANCE OF DOZING
HOW LIKELY ARE YOU TO NOD OFF OR FALL ASLEEP WHILE LYING DOWN TO REST IN THE AFTERNOON WHEN CIRCUMSTANCES PERMIT: MODERATE CHANCE OF DOZING
HOW LIKELY ARE YOU TO NOD OFF OR FALL ASLEEP WHILE SITTING INACTIVE IN A PUBLIC PLACE: SLIGHT CHANCE OF DOZING
HOW LIKELY ARE YOU TO NOD OFF OR FALL ASLEEP WHILE SITTING AND TALKING TO SOMEONE: WOULD NEVER DOZE
HOW LIKELY ARE YOU TO NOD OFF OR FALL ASLEEP WHILE SITTING QUIETLY AFTER LUNCH WITHOUT ALCOHOL: SLIGHT CHANCE OF DOZING

## 2024-11-14 ENCOUNTER — OFFICE VISIT (OUTPATIENT)
Dept: SLEEP MEDICINE | Facility: CLINIC | Age: 64
End: 2024-11-14
Payer: COMMERCIAL

## 2024-11-14 DIAGNOSIS — E78.5 HYPERLIPIDEMIA LDL GOAL <100: ICD-10-CM

## 2024-11-14 DIAGNOSIS — I10 HTN, GOAL BELOW 130/80: ICD-10-CM

## 2024-11-14 DIAGNOSIS — E66.3 OVERWEIGHT (BMI 25.0-29.9): ICD-10-CM

## 2024-11-14 DIAGNOSIS — R29.818 SUSPECTED SLEEP APNEA: ICD-10-CM

## 2024-11-14 DIAGNOSIS — R05.3 CHRONIC COUGH: ICD-10-CM

## 2024-11-14 NOTE — PROGRESS NOTES
Pt is completing a home sleep test. Pt was instructed on how to put on the Noxturnal T3 device and associated equipment before going to bed and given the opportunity to practice putting it on before leaving the sleep center. Pt was reminded to bring the home sleep test kit back to the center tomorrow, at the scheduled time for download and reporting. Patient was instructed to complete study using the following treatment?  None  Neck circumference:  CM /  inches.  Device number:

## 2024-11-15 ENCOUNTER — DOCUMENTATION ONLY (OUTPATIENT)
Dept: SLEEP MEDICINE | Facility: CLINIC | Age: 64
End: 2024-11-15
Payer: COMMERCIAL

## 2024-11-19 NOTE — PROGRESS NOTES
HST POST-STUDY QUESTIONNAIRE    What time did you go to bed?  11:15p  How long do you think it took to fall asleep?  Aprox 2 hrs  What time did you wake up to start the day?  8a  Did you get up during the night at all?  yes  If you woke up, do you remember approximately what time(s)? 0130  Did you have any difficulty with the equipment?  No  Did you us any type of treatment with this study?  None  Was the head of the bed elevated? No  Did you sleep in a recliner?  No  Did you stop using CPAP at least 3 days before this test?  NA  Any other information you'd like us to know? -

## 2024-11-20 NOTE — PROGRESS NOTES
This HSAT was performed using a Noxturnal T3 device which recorded snore, sound, movement activity, body position, nasal pressure, oronasal thermal airflow, pulse, oximetry and both chest and abdominal respiratory effort. HSAT data was restricted to the time patient states they were in bed.     HSAT was scored using 1B 4% hypopnea rule.     HST AHI (Non-PAT): 3.1  Snoring was reported as mild and moderate.  Time with SpO2 below 89% was 1.4 minutes.   Overall signal quality was good     Pt will follow up with sleep provider to determine appropriate therapy.

## 2024-11-20 NOTE — PROCEDURES
Home Sleep Study Interpretation    Patient: Ruddy Aaron  MRN: [id number]  YOB: 1960  Study Date: 11/14/2024  PCP/Referring Provider: Berry Lazo;  Ordering Provider: ROMA Bustamante    Indications for Home Study: Ruddy is a 64 Male who presents with symptoms suggestive of obstructive sleep apnea      Weight: 196.0 lbs  BMI: 28.1   Globe: 7/24  STOP BANG: 3/8      Data: A full night home sleep study was performed recording the standard physiologic parameters including body position, movement, sound, nasal pressure, thermal oral airflow, chest and abdominal movements with respiratory inductance plethysmography, and oxygen saturation by pulse oximetry. Pulse rate was estimated by oximetry recording. This study was considered adequate based on > 4 hours of quality oximetry and respiratory recording. As specified by the AASM Manual for the Scoring of Sleep and Associated events, version 2.3, Rule VIII.D 1B, 4% oxygen desaturation scoring for hypopneas is used as a standard of care on all home sleep apnea testing.    Analysis Time: 515.2 minutes    Respiration:  Sleep Associated Hypoxemia: Sustained hypoxemia was not present. Baseline oxygen saturation was 93. Time with saturation less than 89% was 1.4 minutes. Time with saturation less than 90% was 4.5 minutes. The lowest oxygen saturation was 85.0%.  Snoring: Snoring was present 16% of the time with an average level of 67 dB. Duration of time snoring above 70 dB was 77.2 minutes.    Respiratory events: The home study revealed a presence of 10 obstructive apneas and 0 mixed and central apneas. There were 17 hypopneas resulting in a combined apnea/hypopnea index [AHI] of 3 events per hour with  16 per hour supine, 0  per hour prone, 0 per hour upright, 0  per hour left side, and 1 per hour right side.    Position: Percent of time spent: Supine 17%, prone 18%, upright 0%, on left 18%, on right 47%.    Heart Rate: By Pulse Oximetry normal  rate was noted.    Assessment:  No evidence of obstructive sleep apnea.  Sleep associated hypoxemia was not present.    Recommendations:  Consider positional therapy  Suggest optimizing sleep hygiene and avoiding sleep deprivation  Weight management    Diagnosis Code(s): Snoring R06.83    Electronically signed by: Berto Beltran DO November 20, 2024  Diplomate, American Board of Internal Medicine, Sleep Medicine

## 2024-12-02 ASSESSMENT — SLEEP AND FATIGUE QUESTIONNAIRES
HOW LIKELY ARE YOU TO NOD OFF OR FALL ASLEEP WHILE SITTING AND TALKING TO SOMEONE: WOULD NEVER DOZE
HOW LIKELY ARE YOU TO NOD OFF OR FALL ASLEEP WHILE SITTING QUIETLY AFTER LUNCH WITHOUT ALCOHOL: SLIGHT CHANCE OF DOZING
HOW LIKELY ARE YOU TO NOD OFF OR FALL ASLEEP WHILE LYING DOWN TO REST IN THE AFTERNOON WHEN CIRCUMSTANCES PERMIT: SLIGHT CHANCE OF DOZING
HOW LIKELY ARE YOU TO NOD OFF OR FALL ASLEEP IN A CAR, WHILE STOPPED FOR A FEW MINUTES IN TRAFFIC: WOULD NEVER DOZE
HOW LIKELY ARE YOU TO NOD OFF OR FALL ASLEEP WHILE SITTING INACTIVE IN A PUBLIC PLACE: SLIGHT CHANCE OF DOZING
HOW LIKELY ARE YOU TO NOD OFF OR FALL ASLEEP WHILE SITTING AND READING: SLIGHT CHANCE OF DOZING
HOW LIKELY ARE YOU TO NOD OFF OR FALL ASLEEP WHEN YOU ARE A PASSENGER IN A CAR FOR AN HOUR WITHOUT A BREAK: SLIGHT CHANCE OF DOZING
HOW LIKELY ARE YOU TO NOD OFF OR FALL ASLEEP WHILE WATCHING TV: SLIGHT CHANCE OF DOZING

## 2024-12-02 NOTE — PROGRESS NOTES
Sleep Follow-Up Visit:    Date on this visit: 12/3/2024    Ruddy Aaron comes in today for follow-up of his sleep study done on 11/14/24. Ruddy Aaron was initially seen for excessive snoring. He also had an abnormal oximetry when doing a comprehensive physical at [facility].     HST Results:  Weight: 196 pounds  Analysis Time: 515.2 minutes     Respiration:  Sleep Associated Hypoxemia: Sustained hypoxemia was not present. Baseline oxygen saturation was 93. Time with saturation less than 89% was 1.4 minutes. Time with saturation less than 90% was 4.5 minutes. The lowest oxygen saturation was 85.0%.  Snoring: Snoring was present 16% of the time with an average level of 67 dB. Duration of time snoring above 70 dB was 77.2 minutes.     Respiratory events: The home study revealed a presence of 10 obstructive apneas and 0 mixed and central apneas. There were 17 hypopneas resulting in a combined apnea/hypopnea index [AHI] of 3 events per hour with  16 per hour supine, 0  per hour prone, 0 per hour upright, 0  per hour left side, and 1 per hour right side.     Position: Percent of time spent: Supine 17%, prone 18%, upright 0%, on left 18%, on right 47%.     Heart Rate: By Pulse Oximetry normal rate was noted.    He has been working on exercise and watching his diet. His weight is down to 189# today. His wife and not been complaining as much about his snoring.   He did not sleep in his normal bed and had a very bad night of sleep. The position graph shows he was rolling around a lot. He says he does not normally sleep on his back. He is uncomfortable in that position. He was trying to be on his back to avoid dislodging the machine.     He works second shift. He goes to bed 1 AM and is up by 8-8:30 AM. He does not nap.  He wakes 3+ times per night to urinate. Sometimes he only wakes once. He is on alfuzosin and tolterodine.     Past medical/surgical history, family history, social history, medications and allergies were  reviewed.      Problem List:  Patient Active Problem List    Diagnosis Date Noted    Tinnitus, bilateral 11/19/2023     Priority: Medium    Benign prostatic hyperplasia with urinary frequency 10/09/2021     Priority: Medium    Chronic cough 10/09/2021     Priority: Medium    Seborrheic dermatitis 02/10/2021     Priority: Medium    Hyperlipidemia LDL goal <100 02/10/2021     Priority: Medium    Upper airway cough syndrome 02/10/2021     Priority: Medium    Combined forms of age-related cataract, mild, of both eyes 06/16/2018     Priority: Medium    Nonallergic rhinitis 03/04/2018     Priority: Medium    Neck pain 06/02/2017     Priority: Medium    Overweight (BMI 25.0-29.9) 07/11/2016     Priority: Medium    HTN, goal below 130/80 09/27/2011     Priority: Medium    Seasonal allergic rhinitis 09/27/2011     Priority: Medium    CARDIOVASCULAR SCREENING; LDL GOAL LESS THAN 130 08/08/2011     Priority: Medium    Chronic eczema 08/08/2011     Priority: Medium    Headaches, tension-type      Priority: Medium     Problem list name updated by automated process. Provider to review and confirm          Impression/Plan:    (I10) HTN, goal below 130/80  (primary encounter diagnosis), (R06.83) Snoring  Comment: This study showed no significant apnea on average. He did have about 1.5 hours supine and had a supine AHI of 16/hr. He had a poor night of sleep and states he tried to sleep supine to avoid disrupting the test kit. He normally is not comfortable on his back.  He has been working on exercise and diet and has lost 7# so far.  He would like to avoid CPAP.  Plan: HST - Home Sleep Apnea Test  - WatchPat NonReturnable        He will continue to work on weight loss. He was advised to try the Sleep Noodle device to help prevent supine sleep. We will repeat a WatchPAT One test mid next year to reassess after he has worked more on weight loss. It will also give us a better idea if he does stay off of his back (and not feel  pressured to sleep supine due to the test kit).   He was advised to not take alfuzosin for 2 days prior to the study as it can interfere with accuracy of the study.     He will follow up with me in about 3 month(s) after his sleep study.     33 minutes spent with patient, all of which were spent face-to-face counseling, consulting, coordinating plan of care.      Bennett Goltz, PA-C    CC: Berry Lazo MD

## 2024-12-03 ENCOUNTER — OFFICE VISIT (OUTPATIENT)
Dept: SLEEP MEDICINE | Facility: CLINIC | Age: 64
End: 2024-12-03
Payer: COMMERCIAL

## 2024-12-03 VITALS
HEIGHT: 71 IN | SYSTOLIC BLOOD PRESSURE: 113 MMHG | DIASTOLIC BLOOD PRESSURE: 74 MMHG | HEART RATE: 62 BPM | BODY MASS INDEX: 26.46 KG/M2 | WEIGHT: 189 LBS | OXYGEN SATURATION: 96 %

## 2024-12-03 DIAGNOSIS — I10 HTN, GOAL BELOW 130/80: Primary | ICD-10-CM

## 2024-12-03 DIAGNOSIS — R06.83 SNORING: ICD-10-CM

## 2024-12-03 PROCEDURE — 99214 OFFICE O/P EST MOD 30 MIN: CPT | Performed by: PHYSICIAN ASSISTANT

## 2024-12-03 NOTE — PATIENT INSTRUCTIONS
Use the Sleep Noodle positional restriction device to help prevent sleep on your back.    Below is a link to a YouTube video showing you how the DanceOn One home sleep test is conducted:  https://www.youtube.com/watch?v=BCce_vbiwxE     Do not take the alfuzosin on the night of the test and the night before the test.     Send me a MyChart note about 2 weeks after you do the test and I will send you back a summary of the results.

## 2024-12-03 NOTE — NURSING NOTE
"Chief Complaint   Patient presents with    Study Results       Initial /74   Pulse 62   Ht 1.803 m (5' 11\")   Wt 85.7 kg (189 lb)   SpO2 96%   BMI 26.36 kg/m   Estimated body mass index is 26.36 kg/m  as calculated from the following:    Height as of this encounter: 1.803 m (5' 11\").    Weight as of this encounter: 85.7 kg (189 lb).    Medication Reconciliation: complete  ESS 6  Caron Simpson MA   "

## 2024-12-11 DIAGNOSIS — E78.5 HYPERLIPIDEMIA LDL GOAL <100: ICD-10-CM

## 2024-12-11 DIAGNOSIS — I10 HTN, GOAL BELOW 130/80: ICD-10-CM

## 2024-12-16 RX ORDER — ATORVASTATIN CALCIUM 10 MG/1
10 TABLET, FILM COATED ORAL DAILY
Qty: 90 TABLET | Refills: 1 | Status: SHIPPED | OUTPATIENT
Start: 2024-12-16

## 2024-12-16 RX ORDER — METOPROLOL SUCCINATE 25 MG/1
25 TABLET, EXTENDED RELEASE ORAL DAILY
Qty: 90 TABLET | Refills: 1 | Status: SHIPPED | OUTPATIENT
Start: 2024-12-16

## 2025-05-20 ASSESSMENT — SLEEP AND FATIGUE QUESTIONNAIRES
HOW LIKELY ARE YOU TO NOD OFF OR FALL ASLEEP WHILE SITTING AND READING: MODERATE CHANCE OF DOZING
HOW LIKELY ARE YOU TO NOD OFF OR FALL ASLEEP WHILE SITTING AND TALKING TO SOMEONE: WOULD NEVER DOZE
HOW LIKELY ARE YOU TO NOD OFF OR FALL ASLEEP WHILE WATCHING TV: MODERATE CHANCE OF DOZING
HOW LIKELY ARE YOU TO NOD OFF OR FALL ASLEEP WHEN YOU ARE A PASSENGER IN A CAR FOR AN HOUR WITHOUT A BREAK: MODERATE CHANCE OF DOZING
HOW LIKELY ARE YOU TO NOD OFF OR FALL ASLEEP WHILE SITTING INACTIVE IN A PUBLIC PLACE: MODERATE CHANCE OF DOZING
HOW LIKELY ARE YOU TO NOD OFF OR FALL ASLEEP IN A CAR, WHILE STOPPED FOR A FEW MINUTES IN TRAFFIC: WOULD NEVER DOZE
HOW LIKELY ARE YOU TO NOD OFF OR FALL ASLEEP WHILE SITTING QUIETLY AFTER LUNCH WITHOUT ALCOHOL: MODERATE CHANCE OF DOZING
HOW LIKELY ARE YOU TO NOD OFF OR FALL ASLEEP WHILE LYING DOWN TO REST IN THE AFTERNOON WHEN CIRCUMSTANCES PERMIT: MODERATE CHANCE OF DOZING

## 2025-05-21 DIAGNOSIS — I10 HTN, GOAL BELOW 130/80: ICD-10-CM

## 2025-05-22 RX ORDER — TRIAMTERENE AND HYDROCHLOROTHIAZIDE 37.5; 25 MG/1; MG/1
1 CAPSULE ORAL EVERY MORNING
Qty: 90 CAPSULE | Refills: 1 | Status: SHIPPED | OUTPATIENT
Start: 2025-05-22

## 2025-05-23 ENCOUNTER — VIRTUAL VISIT (OUTPATIENT)
Dept: SLEEP MEDICINE | Facility: CLINIC | Age: 65
End: 2025-05-23
Payer: COMMERCIAL

## 2025-05-23 DIAGNOSIS — I10 HTN, GOAL BELOW 130/80: ICD-10-CM

## 2025-05-23 DIAGNOSIS — R06.83 SNORING: ICD-10-CM

## 2025-05-27 ENCOUNTER — PATIENT OUTREACH (OUTPATIENT)
Dept: CARE COORDINATION | Facility: CLINIC | Age: 65
End: 2025-05-27
Payer: COMMERCIAL

## 2025-05-27 NOTE — PROCEDURES
"WatchPAT - HOME SLEEP STUDY INTERPRETATION    Patient: Ruddy Aaron  MRN: [id number]  YOB: 1960  Study Date: 5/25/2025  Referring Provider: Berry Lazo;   Ordering Provider: Bennett Goltz, PA-C    Indications for Home Study: Ruddy Aaron is a 64 year old male who presents with symptoms suggestive of obstructive sleep apnea.    Estimated body mass index is 26.36 kg/m  as calculated from the following:    Height as of 12/3/24: 1.803 m (5' 11\").    Weight as of 12/3/24: 85.7 kg (189 lb).  Total score - [place]: (Patient-Rptd) 12 (5/20/2025  9:13 PM)      Data: A full night home sleep study was performed recording the standard physiologic parameters including peripheral arterial tonometry (PAT), sound/snoring, body position,  movement, sound, and oxygen saturation by pulse oximetry. Pulse rate was estimated by oximetry recording. Sleep staging (wake, REM, light, and deep sleep) was derived from PAT signal.  This study was considered adequate based on > 4 hours of quality oximetry and respiratory recording. As specified by the AASM Manual for the Scoring of Sleep and Associated events, version 2.3, Rule VIII.D 1B, 4% oxygen desaturation scoring for hypopneas is used as a standard of care on all home sleep apnea testing.    Total Recording Time: 7 hrs, 43 min  Total Sleep Time: 5 hrs, 26 min  % of Sleep Time REM: 24.8%    Respiratory:  Snoring: Snoring was present.  Respiratory events: The PAT respiratory disturbance index [pRDI] was 6.1 events per hour.  The PAT apnea/hypopnea index [pAHI] was 3.5 events per hour.  TARA was 2 events per hour.  During REM sleep the pAHI was 5.2.  Sleep Associated Hypoxemia: sustained hypoxemia was not present. Mean oxygen saturation was 94%.  Minimum was 86%.  Time with saturation less than 88% was 0.3 minutes.    Heart Rate: By pulse oximetry normal rate was noted.     Position: Percent of time spent: supine - 61.7%, prone - 12.7%, on right - 1.8%, on left - " 23.8%.  pAHI was 3.6 per hour supine, 0 per hour prone, N/A per hour on right side, and 5.4 per hour on left side.     Assessment:   This home sleep test is negative for sleep apnea.  Sleep associated hypoxemia was not present.    Diagnosis Code(s): Snoring R06.83    Omi Cervantes MD, May 27, 2025   Diplomate, American Board of Psychiatry and Neurology, Sleep Medicine

## 2025-05-27 NOTE — PROGRESS NOTES
Watch Pat has been scored using rule 1B, 4%.  Patient to follow up with provider to determine appropriate therapy.    PAT AHI: 3.5    Ordering Provider: Bennett Goltz, PA-C

## 2025-05-29 SDOH — HEALTH STABILITY: PHYSICAL HEALTH: ON AVERAGE, HOW MANY MINUTES DO YOU ENGAGE IN EXERCISE AT THIS LEVEL?: 10 MIN

## 2025-05-29 SDOH — HEALTH STABILITY: PHYSICAL HEALTH: ON AVERAGE, HOW MANY DAYS PER WEEK DO YOU ENGAGE IN MODERATE TO STRENUOUS EXERCISE (LIKE A BRISK WALK)?: 5 DAYS

## 2025-05-29 ASSESSMENT — SOCIAL DETERMINANTS OF HEALTH (SDOH): HOW OFTEN DO YOU GET TOGETHER WITH FRIENDS OR RELATIVES?: ONCE A WEEK

## 2025-06-02 ENCOUNTER — OFFICE VISIT (OUTPATIENT)
Dept: FAMILY MEDICINE | Facility: CLINIC | Age: 65
End: 2025-06-02
Payer: COMMERCIAL

## 2025-06-02 ENCOUNTER — TELEPHONE (OUTPATIENT)
Dept: FAMILY MEDICINE | Facility: CLINIC | Age: 65
End: 2025-06-02

## 2025-06-02 VITALS
TEMPERATURE: 98.1 F | HEART RATE: 72 BPM | HEIGHT: 71 IN | RESPIRATION RATE: 16 BRPM | WEIGHT: 190.25 LBS | SYSTOLIC BLOOD PRESSURE: 114 MMHG | BODY MASS INDEX: 26.64 KG/M2 | DIASTOLIC BLOOD PRESSURE: 77 MMHG | OXYGEN SATURATION: 98 %

## 2025-06-02 DIAGNOSIS — Z00.00 ENCOUNTER FOR ANNUAL PHYSICAL EXAM: Primary | ICD-10-CM

## 2025-06-02 DIAGNOSIS — E78.5 HYPERLIPIDEMIA LDL GOAL <100: ICD-10-CM

## 2025-06-02 DIAGNOSIS — E66.3 OVERWEIGHT (BMI 25.0-29.9): ICD-10-CM

## 2025-06-02 DIAGNOSIS — N40.1 BPH ASSOCIATED WITH NOCTURIA: ICD-10-CM

## 2025-06-02 DIAGNOSIS — Z12.5 SCREENING FOR PROSTATE CANCER: ICD-10-CM

## 2025-06-02 DIAGNOSIS — I10 HTN, GOAL BELOW 130/80: ICD-10-CM

## 2025-06-02 DIAGNOSIS — Z12.11 SCREEN FOR COLON CANCER: ICD-10-CM

## 2025-06-02 DIAGNOSIS — R35.1 BPH ASSOCIATED WITH NOCTURIA: ICD-10-CM

## 2025-06-02 LAB
CREAT UR-MCNC: 255 MG/DL
MICROALBUMIN UR-MCNC: <12 MG/L
MICROALBUMIN/CREAT UR: NORMAL MG/G{CREAT}

## 2025-06-02 PROCEDURE — G0103 PSA SCREENING: HCPCS | Performed by: INTERNAL MEDICINE

## 2025-06-02 PROCEDURE — 36415 COLL VENOUS BLD VENIPUNCTURE: CPT | Performed by: INTERNAL MEDICINE

## 2025-06-02 PROCEDURE — 82570 ASSAY OF URINE CREATININE: CPT | Performed by: INTERNAL MEDICINE

## 2025-06-02 PROCEDURE — 80053 COMPREHEN METABOLIC PANEL: CPT | Performed by: INTERNAL MEDICINE

## 2025-06-02 PROCEDURE — 80061 LIPID PANEL: CPT | Performed by: INTERNAL MEDICINE

## 2025-06-02 PROCEDURE — 82043 UR ALBUMIN QUANTITATIVE: CPT | Performed by: INTERNAL MEDICINE

## 2025-06-02 RX ORDER — METOPROLOL SUCCINATE 25 MG/1
25 TABLET, EXTENDED RELEASE ORAL DAILY
Qty: 90 TABLET | Refills: 3 | Status: SHIPPED | OUTPATIENT
Start: 2025-06-02

## 2025-06-02 RX ORDER — ATORVASTATIN CALCIUM 10 MG/1
10 TABLET, FILM COATED ORAL DAILY
Qty: 90 TABLET | Refills: 1 | Status: SHIPPED | OUTPATIENT
Start: 2025-06-02

## 2025-06-02 RX ORDER — TADALAFIL 5 MG/1
5 TABLET ORAL EVERY 24 HOURS
Qty: 30 TABLET | Refills: 11 | Status: SHIPPED | OUTPATIENT
Start: 2025-06-02

## 2025-06-02 ASSESSMENT — PAIN SCALES - GENERAL: PAINLEVEL_OUTOF10: NO PAIN (0)

## 2025-06-02 NOTE — PROGRESS NOTES
Advance Care Planning  Discussed advance care planning with patient; however, patient declined at this time.        5/29/2025   General Health   How would you rate your overall physical health? Good   Feel stress (tense, anxious, or unable to sleep) Not at all         5/29/2025   Nutrition   Three or more servings of calcium each day? Yes   Diet: Regular (no restrictions)   How many servings of fruit and vegetables per day? (!) 0-1   How many sweetened beverages each day? 0-1         5/29/2025   Exercise   Days per week of moderate/strenous exercise 5 days   Average minutes spent exercising at this level 10 min         5/29/2025   Social Factors   Frequency of gathering with friends or relatives Once a week   Worry food won't last until get money to buy more Patient declined   Food not last or not have enough money for food? Patient declined   Do you have housing? (Housing is defined as stable permanent housing and does not include staying outside in a car, in a tent, in an abandoned building, in an overnight shelter, or couch-surfing.) Patient declined   Are you worried about losing your housing? Patient declined   Lack of transportation? Patient declined   Unable to get utilities (heat,electricity)? Patient declined         5/29/2025   Fall Risk   Fallen 2 or more times in the past year? No   Trouble with walking or balance? No          5/29/2025   Dental   Dentist two times every year? Yes         Today's PHQ-2 Score:       6/2/2025    12:07 AM   PHQ-2 ( 1999 [employer])   Q1: Little interest or pleasure in doing things 0   Q2: Feeling down, depressed or hopeless 0   PHQ-2 Score 0    Q1: Little interest or pleasure in doing things Not at all   Q2: Feeling down, depressed or hopeless Not at all   PHQ-2 Score 0       Patient-reported           5/29/2025   Substance Use   Alcohol more than 3/day or more than 7/wk No   Do you use any other substances recreationally? No     Social History     Tobacco Use    Smoking  "status: Never    Smokeless tobacco: Never   Vaping Use    Vaping status: Never Used   Substance Use Topics    Alcohol use: Not Currently     Alcohol/week: 1.0 standard drink of alcohol     Types: 1 Standard drinks or equivalent per week     Comment: ocassional    Drug use: No     {Provider  If there are gaps in the social history shown above, please follow the link to update and then refresh the note Link to Social and Substance History :466294}      5/29/2025   STI Screening   New sexual partner(s) since last STI/HIV test? No   Last PSA:   PSA   Date Value Ref Range Status   06/23/2015 1.10 0 - 4 ug/L Final     Prostate Specific Antigen Screen   Date Value Ref Range Status   04/05/2023 1.58 0.00 - 4.00 ug/L Final     ASCVD Risk   The ASCVD Risk score (Susan THOMSON, et al., 2019) failed to calculate for the following reasons:    The valid total cholesterol range is 130 to 320 mg/dL    {HISTORY OPTIONS (Optional):801048}    {ROS Picklists (Optional):199536}     Objective    Exam  /77 (BP Location: Left arm, Patient Position: Sitting, Cuff Size: Adult Regular)   Pulse 72   Temp 98.1  F (36.7  C) (Temporal)   Resp 16   Ht 1.795 m (5' 10.67\")   Wt 86.3 kg (190 lb 4 oz)   SpO2 98%   BMI 26.78 kg/m     Estimated body mass index is 26.78 kg/m  as calculated from the following:    Height as of this encounter: 1.795 m (5' 10.67\").    Weight as of this encounter: 86.3 kg (190 lb 4 oz).    Physical Exam  {Exam Choices (Optional):734069}        Signed Electronically by: Berry Lazo MD  {Email feedback regarding this note to [email or web address]   :298197}  "    Hypertension Father     Eye Disorder Other         mother's side of family has cataracts, and glaucoma?    Asthma No family hx of     C.A.D. No family hx of     Diabetes No family hx of     Cerebrovascular Disease No family hx of     Breast Cancer No family hx of     Cancer - colorectal No family hx of     Prostate Cancer No family hx of     Glaucoma No family hx of     Macular Degeneration No family hx of          Allergies   Allergen Reactions    Lisinopril Cough

## 2025-06-02 NOTE — PATIENT INSTRUCTIONS
At [facility], we strive to deliver an exceptional experience to you, every time we see you. If you receive a survey, please let us know what we are doing well and/or what we could improve upon, as we do value your feedback.  If you have MyChart, you can expect to receive results automatically within 24 hours of their completion.  Your provider will send a note interpreting your results as well.   If you do not have MyChart, you should receive your results in about a week by mail.    Your care team:                            Family Medicine Internal Medicine   MD Berry Gutiérrez, MD Perla Milligan, MD Eh Spring, MD Kim Koch, PA-C KRISTIN Carcamo, SONAL Fallon, MD Pediatrics   MD Litzy Corey, MD Estrella Simpson, PAKAYLEN Diamond APRN CNP   MD Flori Mason, MD Yaneth Harvey, CNP      Same-Day Provider (No follow-up visits)    TUNG Molina PA-C     Clinic hours: Monday - Thursday 7 am-6 pm; Fridays 7 am-5 pm.   Urgent care: Monday - Friday 10 am- 8 pm; Saturday and Sunday 9 am-5 pm.    Clinic: [phone]       [facility]: Monday - Thursday 8 am - 7 pm; Friday 8 am - 6 pm  [facility]: [phone]     [facility] Scheduling: Monday - Friday 7 am - 7 pm; Saturday 7 am - 3:30 pm  [phone] [place] : [phone]

## 2025-06-03 DIAGNOSIS — R35.1 BPH ASSOCIATED WITH NOCTURIA: ICD-10-CM

## 2025-06-03 DIAGNOSIS — R39.15 URINARY URGENCY: ICD-10-CM

## 2025-06-03 DIAGNOSIS — N40.1 BPH ASSOCIATED WITH NOCTURIA: ICD-10-CM

## 2025-06-03 DIAGNOSIS — N32.81 OAB (OVERACTIVE BLADDER): ICD-10-CM

## 2025-06-03 LAB
ALBUMIN SERPL BCG-MCNC: 4.1 G/DL (ref 3.5–5.2)
ALP SERPL-CCNC: 74 U/L (ref 40–150)
ALT SERPL W P-5'-P-CCNC: 26 U/L (ref 0–70)
ANION GAP SERPL CALCULATED.3IONS-SCNC: 10 MMOL/L (ref 7–15)
AST SERPL W P-5'-P-CCNC: 27 U/L (ref 0–45)
BILIRUB SERPL-MCNC: 1 MG/DL
BUN SERPL-MCNC: 22.3 MG/DL (ref 8–23)
CALCIUM SERPL-MCNC: 9.4 MG/DL (ref 8.8–10.4)
CHLORIDE SERPL-SCNC: 103 MMOL/L (ref 98–107)
CHOLEST SERPL-MCNC: 113 MG/DL
CREAT SERPL-MCNC: 1.12 MG/DL (ref 0.67–1.17)
EGFRCR SERPLBLD CKD-EPI 2021: 74 ML/MIN/1.73M2
FASTING STATUS PATIENT QL REPORTED: YES
FASTING STATUS PATIENT QL REPORTED: YES
GLUCOSE SERPL-MCNC: 88 MG/DL (ref 70–99)
HCO3 SERPL-SCNC: 25 MMOL/L (ref 22–29)
HDLC SERPL-MCNC: 48 MG/DL
LDLC SERPL CALC-MCNC: 55 MG/DL
NONHDLC SERPL-MCNC: 65 MG/DL
POTASSIUM SERPL-SCNC: 4 MMOL/L (ref 3.4–5.3)
PROT SERPL-MCNC: 6.4 G/DL (ref 6.4–8.3)
PSA SERPL DL<=0.01 NG/ML-MCNC: 2.14 NG/ML (ref 0–4.5)
SODIUM SERPL-SCNC: 138 MMOL/L (ref 135–145)
TRIGL SERPL-MCNC: 49 MG/DL

## 2025-06-03 NOTE — TELEPHONE ENCOUNTER
PRIOR AUTHORIZATION DENIED    Medication: ZEPBOUND 2.5 MG/0.5ML SC SOAJ  Insurance Company: [employer] - Phone [phone] Fax [phone]  Denial Date: 6/2/2025  Denial Reason(s):       Appeal Information:       Patient Notified: NO

## 2025-06-04 RX ORDER — TOLTERODINE 4 MG/1
4 CAPSULE, EXTENDED RELEASE ORAL DAILY
Qty: 90 CAPSULE | Refills: 0 | Status: SHIPPED | OUTPATIENT
Start: 2025-06-04

## 2025-06-04 NOTE — TELEPHONE ENCOUNTER
Last Written Prescription:  tolterodine ER (DETROL LA) 4 MG 24 hr capsule  4 mg, DAILY           Summary: Take 1 capsule (4 mg) by mouth daily, Disp-90 capsule, R-3, E-Prescribe  Dose, Route, Frequency: 4 mg, Oral, DAILYStart: 06/18/2024End: 06/18/2025Ordered On: 06/18/2024Pharmacy: [facility]/pharm[facility] #[id number] - [place], [place] - [address] Associated: Taking: Long-term: Med Note:                Directions: Take 1 capsule (4 mg) by mouth daily  Ordering Department:  UROLOGY  Authorized By: Alex White MD  Dispense: 90 capsule  Refills: 3 ordered     ast Visit Date: 06/18/2024 Office Visit Urology - JEREMIAS White  Future Visit Date: 6/19/25  ----------------------      Refill decision: Medication refilled per  Medication Refill in Ambulatory Care  policy.   []  If no future appointment scheduled: Route to Clinic Coordinators to contact the pt for appointment.            Request from pharmacy:  Requested Prescriptions   Pending Prescriptions Disp Refills    tolterodine ER (DETROL LA) 4 MG 24 hr capsule [Pharmacy Med Name: TOLTERODINE TART ER 4 MG CAP] 90 capsule 3     Sig: TAKE 1 CAPSULE BY MOUTH EVERY DAY       Muscarinic Antagonists (Urinary Incontinence Agents) Passed - 6/4/2025  3:28 PM        Passed - Patient does not have a diagnosis of glaucoma on the problem list     If glaucoma diagnosis is new, refer refill to physician.          Passed - Medication is active on med list and the sig matches. RN to manually verify dose and sig if red X/fail.     If the protocol passes (green check), you do not need to verify med dose and sig.    A prescription matches if they are the same clinical intention.    For Example: once daily and every morning are the same.    The protocol can not identify upper and lower case letters as matching and will fail.     For Example: Take 1 tablet (50 mg) by mouth daily     TAKE 1 TABLET (50 MG) BY MOUTH DAILY    For all fails (red x), verify dose and sig.    If the  refill does match what is on file, the RN can still proceed to approve the refill request.       If they do not match, route to the appropriate provider.             Passed - Recent (12 month) or future (90 days) visit with authorizing provider's specialty (provided they have been seen in the past 15 months)     The patient must have completed an in-person or virtual visit within the past 12 months or has a future visit scheduled within the next 90 days with the authorizing provider s specialty.  Urgent care and e-visits do not qualify as an office visit for this protocol.          Passed - Medication indicated for associated diagnosis     Medication is associated with one or more of the following diagnoses:  Bladder pain  Disorder of the GI tract  Hyperhidrosis  Neurogenic bladder  Neurogenic detrusor overactivity  Overactive Bladder  Urge incontinence  Urgent desire to urinate  Incontinence  Spasm of urinary bladder          Passed - Patient is 18 years of age or older         Tatyana B, RN  P Central Nursing/Red Flag Triage & Med Refill Team

## 2025-06-09 ENCOUNTER — OFFICE VISIT (OUTPATIENT)
Dept: OPHTHALMOLOGY | Facility: CLINIC | Age: 65
End: 2025-06-09
Payer: COMMERCIAL

## 2025-06-09 DIAGNOSIS — Z01.01 ENCOUNTER FOR EXAMINATION OF EYES AND VISION WITH ABNORMAL FINDINGS: ICD-10-CM

## 2025-06-09 DIAGNOSIS — H52.4 PRESBYOPIA: ICD-10-CM

## 2025-06-09 DIAGNOSIS — H25.813 COMBINED FORMS OF AGE-RELATED CATARACT OF BOTH EYES: Primary | ICD-10-CM

## 2025-06-09 PROCEDURE — 92015 DETERMINE REFRACTIVE STATE: CPT | Performed by: OPHTHALMOLOGY

## 2025-06-09 PROCEDURE — 92014 COMPRE OPH EXAM EST PT 1/>: CPT | Performed by: OPHTHALMOLOGY

## 2025-06-09 ASSESSMENT — REFRACTION_WEARINGRX
OS_ADD: +2.75
OS_CYLINDER: SPHERE
OD_ADD: +2.75
OD_SPHERE: +0.25
OS_SPHERE: PLANO
SPECS_TYPE: PAL
OS_CYLINDER: +0.25
OD_ADD: +2.50
SPECS_TYPE: PAL
OS_AXIS: 021
OD_SPHERE: +0.25
OD_CYLINDER: +0.25
OS_ADD: +2.50
OD_CYLINDER: SPHERE
OS_SPHERE: +0.25
OD_AXIS: 095

## 2025-06-09 ASSESSMENT — CONF VISUAL FIELD
OD_INFERIOR_NASAL_RESTRICTION: 0
OS_NORMAL: 1
OS_SUPERIOR_NASAL_RESTRICTION: 0
OD_NORMAL: 1
OD_SUPERIOR_NASAL_RESTRICTION: 0
OS_INFERIOR_NASAL_RESTRICTION: 0
OD_INFERIOR_TEMPORAL_RESTRICTION: 0
OD_SUPERIOR_TEMPORAL_RESTRICTION: 0
OS_INFERIOR_TEMPORAL_RESTRICTION: 0
OS_SUPERIOR_TEMPORAL_RESTRICTION: 0

## 2025-06-09 ASSESSMENT — VISUAL ACUITY
CORRECTION_TYPE: GLASSES
OS_CC: 20/20
OD_CC: J2
OS_SC: 20/25
OD_CC: 20/20
OD_CC+: -2
METHOD: SNELLEN - LINEAR
OS_SC+: -2
OS_CC: J2-
OD_SC: 20/25

## 2025-06-09 ASSESSMENT — TONOMETRY
IOP_METHOD: APPLANATION
OS_IOP_MMHG: 14
OD_IOP_MMHG: 13

## 2025-06-09 ASSESSMENT — EXTERNAL EXAM - RIGHT EYE: OD_EXAM: 1+ BROW PTOSIS

## 2025-06-09 ASSESSMENT — REFRACTION_MANIFEST
OD_SPHERE: +0.25
OS_CYLINDER: SPHERE
OD_CYLINDER: +0.25
OS_ADD: +2.50
OD_ADD: +2.50
OS_SPHERE: +0.25
OD_AXIS: 038

## 2025-06-09 ASSESSMENT — SLIT LAMP EXAM - LIDS
COMMENTS: 1+ DERMATOCHALASIS
COMMENTS: 1+ DERMATOCHALASIS

## 2025-06-09 ASSESSMENT — CUP TO DISC RATIO
OS_RATIO: 0.3
OD_RATIO: 0.3

## 2025-06-09 ASSESSMENT — EXTERNAL EXAM - LEFT EYE: OS_EXAM: 1+ BROW PTOSIS

## 2025-06-09 NOTE — PROGRESS NOTES
" Current Eye Medications:  none.       Subjective:  Comprehensive Eye Exam.   He has prescription glasses that he wears occasionally.  Primarily he wears +2.00 over-the-counter readers.  Distance vision is adequate without correction.       Objective:  See Ophthalmology Exam.       Assessment:  Stable eye exam.      ICD-10-CM    1. Combined forms of age-related cataract, mild, of both eyes  H25.813       2. Encounter for examination of eyes and vision with abnormal findings  Z01.01       3. Presbyopia  H52.4            Plan:  Glasses prescription given - optional  Can continue over the counter readers as needed. Could use up to +2.75 as needed. Using stronger glasses will not weaken your eyes or make you more dependent on glasses.      May use artificial tears up to four times a day (like Refresh Optive, Systane Balance, or TheraTears. Avoid \"get the red out\" drops and generic artifical tears).     Possible posterior vitreous detachment (sudden onset large floater and/or flashing lights) both eyes discussed.     Call in February 2026 for an appointment in June 2026 for Complete Exam    Dr. Stevens [phone]       "

## 2025-06-09 NOTE — LETTER
"6/9/2025      Ruddy Aaron  [address]  [place] [address]      Dear Colleague,    Thank you for referring your patient, Ruddy Aaron, to the [facility]. Please see a copy of my visit note below.     Current Eye Medications:  none.       Subjective:  Comprehensive Eye Exam.   He has prescription glasses that he wears occasionally.  Primarily he wears +2.00 over-the-counter readers.  Distance vision is adequate without correction.       Objective:  See Ophthalmology Exam.       Assessment:  Stable eye exam.      ICD-10-CM    1. Combined forms of age-related cataract, mild, of both eyes  H25.813       2. Encounter for examination of eyes and vision with abnormal findings  Z01.01       3. Presbyopia  H52.4            Plan:  Glasses prescription given - optional  Can continue over the counter readers as needed. Could use up to +2.75 as needed. Using stronger glasses will not weaken your eyes or make you more dependent on glasses.      May use artificial tears up to four times a day (like Refresh Optive, Systane Balance, or TheraTears. Avoid \"get the red out\" drops and generic artifical tears).     Possible posterior vitreous detachment (sudden onset large floater and/or flashing lights) both eyes discussed.     Call in February 2026 for an appointment in June 2026 for Complete Exam    Dr. Stevens [phone]         Again, thank you for allowing me to participate in the care of your patient.        Sincerely,        Reinaldo Stevens MD    Electronically signed"

## 2025-06-09 NOTE — PATIENT INSTRUCTIONS
"Glasses prescription given - optional  Can continue over the counter readers as needed. Could use up to +2.75 as needed. Using stronger glasses will not weaken your eyes or make you more dependent on glasses.      May use artificial tears up to four times a day (like Refresh Optive, Systane Balance, or TheraTears. Avoid \"get the red out\" drops and generic artifical tears).     Possible posterior vitreous detachment (sudden onset large floater and/or flashing lights) both eyes discussed.     Call in February 2026 for an appointment in June 2026 for Complete Exam    Dr. Stevens [phone]    "

## 2025-06-19 ENCOUNTER — OFFICE VISIT (OUTPATIENT)
Dept: UROLOGY | Facility: CLINIC | Age: 65
End: 2025-06-19
Payer: COMMERCIAL

## 2025-06-19 DIAGNOSIS — N40.1 BPH ASSOCIATED WITH NOCTURIA: ICD-10-CM

## 2025-06-19 DIAGNOSIS — R39.15 URINARY URGENCY: ICD-10-CM

## 2025-06-19 DIAGNOSIS — R35.1 BPH ASSOCIATED WITH NOCTURIA: ICD-10-CM

## 2025-06-19 DIAGNOSIS — N32.81 OAB (OVERACTIVE BLADDER): ICD-10-CM

## 2025-06-19 RX ORDER — ALFUZOSIN HYDROCHLORIDE 10 MG/1
1 TABLET, EXTENDED RELEASE ORAL DAILY
Qty: 90 TABLET | Refills: 3 | Status: SHIPPED | OUTPATIENT
Start: 2025-06-19

## 2025-06-19 RX ORDER — TADALAFIL 5 MG/1
5 TABLET ORAL EVERY 24 HOURS
Qty: 90 TABLET | Refills: 3 | Status: SHIPPED | OUTPATIENT
Start: 2025-06-19

## 2025-06-19 RX ORDER — TADALAFIL 5 MG/1
5 TABLET ORAL DAILY
COMMUNITY
Start: 2024-07-09

## 2025-06-19 RX ORDER — TROSPIUM CHLORIDE 20 MG/1
20 TABLET, FILM COATED ORAL
Qty: 180 TABLET | Refills: 3 | Status: SHIPPED | OUTPATIENT
Start: 2025-06-19 | End: 2026-06-19

## 2025-06-19 RX ORDER — TOLTERODINE 4 MG/1
4 CAPSULE, EXTENDED RELEASE ORAL DAILY
Qty: 90 CAPSULE | Refills: 0 | Status: CANCELLED | OUTPATIENT
Start: 2025-06-19

## 2025-06-19 NOTE — PROGRESS NOTES
"[facility]   CHIEF COMPLAINT   It was my pleasure to see Ruddy Aaron who is a 64 year old male for follow-up of BPH with urgency.      HPI   Ruddy Aaron is a very pleasant 64 year old male    Initially seen 11/30/21:  Ruddy Aaron is a 61 year old male who is being seen for evaluation of LUTS     Having strong urges to urinate every 1-2 hours during the day  Overnight, needing to void every 3-4 hours  Strong urges, at times will need to pee outside before going into his house  Drinks 2 cups of tea - one in the morning and one at night  Otherwise he drinks water     He was started on tamsulosin 0.4mg (Flomax) and he developed some abdominal pain  This was worse with movement, but also \"bladder tenderness\"  He stopped the tamsulosin 0.4mg (Flomax) and the pain resolved, still very mild tenderness      AUASS: 5-7-2-5-0-0-5 = 15  QOL = 3  PVR = 37     He works second shift for [employer]      No family history of prostate issues    2/3/22:  He was started on Alfuzosin at last visit  He has been doing well on this with no abdominal pain or issues    AUASS: 2-3-7-5-1-0-3 = 12  QOL = 1  PVR = 34cc    6/29/2023:  Nocturia really flucuates  Some nights this will be great with 0 or 1 time waking, other times he will be about 4 times  This does not seem to correlate with fluid intake that he is aware of  He does have fluctuating stress from work and he notes the summer season is quite a busy season    AUASS: 0-3-3-0-3-0-4 = 13  QOL = 1/2  PVR = 13cc    6/18/2024:  Drinks tea in the morning and at night  Water in between   He notes that he will get a very strong urge to urinate after getting to work has he is working to get through security    AUASS: 2-9-5-4-2-2-4 = 18  QOL = 2  PVR = 3cc    TODAY 6/19/2025:  Still having some bothersome urgency and frequency  He notes he often has to void at least every 2 hours and sometimes this can feel quite urgent  He has tried cutting out the evening tea consumption which has " improved his nocturia  He also notes a trial of a prostate supplement in the last week, though this does not include saw palmetto    AUASS: 1-2-8-1-1-1-1 = 11  QOL = 3  PVR = 1cc    PHYSICAL EXAM  Patient is a 64 year old  male   Vitals: There were no vitals taken for this visit.  There is no height or weight on file to calculate BMI.  General Appearance Adult:   Alert, no acute distress, oriented  HENT: throat/mouth:normal, good dentition  Lungs: no respiratory distress, or pursed lip breathing  Musculoskeltal: extremities normal, no peripheral edema  Neuro: Alert, oriented, speech and mentation normal  Psych: affect and mood normal  Gait: Normal     PSA   Date Value Ref Range Status   06/23/2015 1.10 0 - 4 ug/L Final     Prostate Specific Antigen Screen   Date Value Ref Range Status   06/02/2025 2.14 0.00 - 4.50 ng/mL Final   04/05/2023 1.58 0.00 - 4.00 ug/L Final      Creatinine   Date Value Ref Range Status   06/02/2025 1.12 0.67 - 1.17 mg/dL Final   02/10/2021 1.15 0.66 - 1.25 mg/dL Final        IMAGING:  All pertinent imaging reviewed:     All imaging studies reviewed by me.  I personally reviewed these imaging films.  A formal report from radiology will follow.     CT ABD/PEL 10/18/21:  Findings:      Abdomen and pelvis: Hepatic parenchyma is diffusely hypoattenuating  with focal fatty sparing along the gallbladder fossa. Multiple tiny  hypoattenuating lesions, too small to characterize. Gallbladder is  partially dilated with mild wall thickening of the gallbladder fundus.  No pericholecystic fluid. No cholelithiasis. No intra- or extrahepatic  biliary dilation. Spleen, pancreas, and adrenal glands are  unremarkable. Kidneys demonstrate symmetric enhancement without solid  lesions, hydronephrosis, or nephrolithiasis. Ureters and urinary  bladder are unremarkable. Prostatomegaly.     Small hiatal hernia. Stomach and small intestine are otherwise  unremarkable. Colonic diverticulosis without evidence of  acute  diverticulitis. Appendix is visualized and normal in caliber.      No suspicious abdominal or pelvic lymphadenopathy. No free fluid. No  pneumoperitoneum.     Abdominal aorta is normal in caliber and patent. Celiac and mesenteric  artery origins are unremarkable. Portal veins and IVC are patent.     Lower thorax: Unremarkable.     Bones and soft tissues: No acute or suspicious osseous abnormality.  Soft tissue is unremarkable.                                                                      Impression:   1. Hepatic steatosis  2. Persistent focal gallbladder wall thickening as seen on ultrasound  10/13/2021, nonspecific, possibly focal asymmetric adenomyomatosis in  an asymptomatic patient. Recommend 6 month follow-up abdominal  ultrasound as previously recommended.            ASSESSMENT and PLAN  64-year-old man with symptoms of BPH and LUTS    BPH and LUTS  -We again reviewed the pathophysiology of the bladder and the prostate and the normal changes associated with the development of BPH and LUTS  - He has been doing fairly well from an outlet symptom standpoint with alfuzosin 10 mg daily and refill prescription sent to the pharmacy and has had further improvement with tadalafil 5 mg daily and refill prescription of this medication sent to the pharmacy as well  -She continues to have bothersome urgency and frequency despite Detrol  - We discussed trial of trospium 20 mg twice daily and prescription sent to the pharmacy  - We discussed that if his urinary urgency frequency symptoms persist despite 2 different anticholinergics, we could then try switching to a beta 3 agonist  - Again discussed the impact of tea and coffee  - Follow-up in 1 year with an AUA symptom score and PVR with Misbah MACKEY  -This is a chronic problem with ongoing symptoms requiring medication adjustments    Time spent: 15 minutes spent on the date of the encounter doing chart review, history and exam, documentation and further  activities as noted above.    Note copy attestation: the elements have been reviewed, edited as needed, and remain pertinent to today's visit.     Alex White MD   Urology  [facility]  [facility] Phone: [phone]  [facility] Phone: [phone]

## 2025-06-19 NOTE — NURSING NOTE
Ruddy Aaron's goals for this visit include:   Chief Complaint   Patient presents with    RECHECK     BPH and LUTS       He requests these members of his care team be copied on today's visit information:       PCP: Berry Lazo    Referring Provider:  Referred Self, MD  No address on file    post void residual: 1 ml    Do you need any medication refills at today's visit?     Barbie Wilson LPN on 6/19/2025 at 8:37 AM

## 2025-06-23 ENCOUNTER — TRANSFERRED RECORDS (OUTPATIENT)
Dept: ADMINISTRATIVE | Facility: CLINIC | Age: 65
End: 2025-06-23

## 2025-06-25 PROBLEM — D12.6 ADENOMATOUS POLYP OF COLON: Status: ACTIVE | Noted: 2025-06-25

## 2025-06-25 NOTE — PROCEDURES
[facility]   [address], [address], [place] [address]     Patient Name: Ruddy Aaron  Gender:  Male  Exam Date: 06/23/2025 Visit Number:  [id number]  Age: 64 Years YOB: 1960  Attending MD: Omar Ronquillo MD Medical Record#:  [id number]    Procedure: Colonoscopy   Indications: Colorectal cancer screening      Referring MD: Referral Self  Primary MD:      Dorothy Snow MD  Medications: Admitting Medications:   0.9% Normal Saline at TKO   Intra Procedure Medications:   Patient received monitored anesthesia care.     Complications: No immediate complications  ______________________________________________________________________________  Procedure:   An examination of the heart and lungs was performed and found to be within acceptable limits.  .  The patient was therefore deemed a reasonable candidate for endoscopy and sedation.   The risks and benefits of the procedure were explained to the patient.After obtaining informed consent, the patient received monitored anesthesia care and I passed the scope   without difficulty via the rectum to the cecum.  The appendiceal orifice and ic valve were identified.  The scope was retroflexed during the examination  The quality of the prep was good  (Miralax/Gatorade/2 tablets Bisacodyl/Magnesium Citrate).    This was a complete examination throughout the entire colon.    Findings:    Polyp location: ascending colon.  Quantity: 1.  Size: 4 mm.  Polyp shape:  sessile.         Maneuver: polypectomy was performed with a cold biopsy forceps.       Removal:  complete.  Retrieval: complete.  Bleeding: none.    Diverticulosis.  Location: - ascending colon - descending colon - sigmoid.    Description:  moderate.    Size:  medium.    Quantity:  many.  No inflammation present.    Hemorrhoids.  Internal hemorrhoids without bleeding.    Impression:  Colorectal polyps  Diverticulosis of colon without diverticulitis  Hemorrhoids,  internal    Preliminary Plan:  Repeat colonoscopy in 5 years  Recommendation Comments:  Await path.  High fiber diet.  Recommend fiber supplement with metamucil on daily basis.  Pathology Results:  A: COLON, ASCENDING, POLYP:           1. Tubular adenoma           2. Negative for high grade dysplasia           3. Per the colonoscopy report:               a. Polyp size: 4 mm               b. Resection: Complete               c. Retrieval: Complete      MICROSCOPIC  A: Performed     Electronically signed by: Chino Valenzuela MD    Interpreted at [facility], [address] [id number]    Orders    Diagnostics:  Procedure Comments Timeframe Assessment   Colonoscopy  5 Years K63.5     Instruction(s)/Education:  Instruction/Education Timeframe Assessment   Colon Cancer Prevention  K57.30   Colon Polyps  K57.30   Diverticulosis/Diverticulitis  K57.30   Hemorrhoids (Internal)  K57.30   High Fiber Diet  K57.30       Final Plan:  Repeat colonoscopy in 5 years.    We will attempt to contact you at appropriate intervals via [place] mail.  We may not be able to find you or contact you at that time, therefore you should know that the responsibility for following our recommendation rests with you.  If you don't hear from us at the time your procedure is due, please contact our office to schedule an appointment.  If your contact information should change, please contact our office so that we can update your record.      _Electronically signed by:___________________  Omar Ronquillo MD                 06/23/2025    cc: Dorothy Snow MD

## 2025-06-26 ENCOUNTER — PATIENT OUTREACH (OUTPATIENT)
Dept: GASTROENTEROLOGY | Facility: CLINIC | Age: 65
End: 2025-06-26
Payer: COMMERCIAL

## 2025-08-10 DIAGNOSIS — R35.1 BPH ASSOCIATED WITH NOCTURIA: ICD-10-CM

## 2025-08-10 DIAGNOSIS — N40.1 BPH ASSOCIATED WITH NOCTURIA: ICD-10-CM

## 2025-08-12 RX ORDER — ALFUZOSIN HYDROCHLORIDE 10 MG/1
1 TABLET, EXTENDED RELEASE ORAL DAILY
Qty: 90 TABLET | Refills: 3 | OUTPATIENT
Start: 2025-08-12